# Patient Record
Sex: MALE | Race: WHITE | Employment: UNEMPLOYED | RURAL
[De-identification: names, ages, dates, MRNs, and addresses within clinical notes are randomized per-mention and may not be internally consistent; named-entity substitution may affect disease eponyms.]

---

## 2017-04-17 ENCOUNTER — OFFICE VISIT (OUTPATIENT)
Dept: PRIMARY CARE CLINIC | Age: 52
End: 2017-04-17
Payer: MEDICARE

## 2017-04-17 ENCOUNTER — HOSPITAL ENCOUNTER (OUTPATIENT)
Dept: OTHER | Age: 52
Discharge: HOME | End: 2017-04-17
Attending: FAMILY MEDICINE | Admitting: FAMILY MEDICINE

## 2017-04-17 VITALS
HEART RATE: 77 BPM | RESPIRATION RATE: 18 BRPM | HEIGHT: 71 IN | OXYGEN SATURATION: 97 % | SYSTOLIC BLOOD PRESSURE: 126 MMHG | WEIGHT: 140.2 LBS | BODY MASS INDEX: 19.63 KG/M2 | DIASTOLIC BLOOD PRESSURE: 62 MMHG

## 2017-04-17 DIAGNOSIS — R35.1 NOCTURIA: ICD-10-CM

## 2017-04-17 DIAGNOSIS — I25.10 CORONARY ARTERY DISEASE INVOLVING NATIVE CORONARY ARTERY OF NATIVE HEART, ANGINA PRESENCE UNSPECIFIED: Primary | ICD-10-CM

## 2017-04-17 DIAGNOSIS — J43.1 PANLOBULAR EMPHYSEMA (HCC): ICD-10-CM

## 2017-04-17 DIAGNOSIS — G89.4 CHRONIC PAIN SYNDROME: ICD-10-CM

## 2017-04-17 DIAGNOSIS — G60.0 CHARCOT-MARIE-TOOTH ATROPHY: ICD-10-CM

## 2017-04-17 DIAGNOSIS — I25.10 CORONARY ARTERY DISEASE INVOLVING NATIVE CORONARY ARTERY OF NATIVE HEART, ANGINA PRESENCE UNSPECIFIED: ICD-10-CM

## 2017-04-17 LAB
A/G RATIO: 1.8 (ref 0.8–2)
ALBUMIN SERPL-MCNC: 4.4 G/DL (ref 3.4–4.8)
ALP BLD-CCNC: 73 U/L (ref 25–100)
ALT SERPL-CCNC: 36 U/L (ref 4–36)
ANION GAP SERPL CALCULATED.3IONS-SCNC: 13 MMOL/L (ref 3–16)
AST SERPL-CCNC: 29 U/L (ref 8–33)
BILIRUB SERPL-MCNC: 0.5 MG/DL (ref 0.3–1.2)
BUN BLDV-MCNC: 9 MG/DL (ref 6–20)
CALCIUM SERPL-MCNC: 9.1 MG/DL (ref 8.5–10.5)
CHLORIDE BLD-SCNC: 103 MMOL/L (ref 98–107)
CHOLESTEROL, TOTAL: 147 MG/DL (ref 0–200)
CO2: 27 MMOL/L (ref 20–30)
CREAT SERPL-MCNC: 0.9 MG/DL (ref 0.4–1.2)
GFR AFRICAN AMERICAN: >59
GFR NON-AFRICAN AMERICAN: >59
GLOBULIN: 2.5 G/DL
GLUCOSE BLD-MCNC: 87 MG/DL (ref 74–106)
HCT VFR BLD CALC: 40.7 % (ref 40–54)
HDLC SERPL-MCNC: 41 MG/DL (ref 40–60)
HEMOGLOBIN: 14.1 G/DL (ref 13–18)
LDL CHOLESTEROL CALCULATED: 88 MG/DL
LDL CHOLESTEROL DIRECT: 96 MG/DL
MCH RBC QN AUTO: 32.5 PG (ref 27–32)
MCHC RBC AUTO-ENTMCNC: 34.6 G/DL (ref 31–35)
MCV RBC AUTO: 93.8 FL (ref 80–100)
PDW BLD-RTO: 13.4 % (ref 11–16)
PLATELET # BLD: 296 K/UL (ref 150–400)
PMV BLD AUTO: 10.6 FL (ref 6–10)
POTASSIUM SERPL-SCNC: 4.7 MMOL/L (ref 3.4–5.1)
PROSTATE SPECIFIC ANTIGEN: 0.31 NG/ML (ref 0–4)
RBC # BLD: 4.34 M/UL (ref 4.5–6)
SODIUM BLD-SCNC: 143 MMOL/L (ref 136–145)
TOTAL PROTEIN: 6.9 G/DL (ref 6.4–8.3)
TRIGL SERPL-MCNC: 89 MG/DL (ref 0–249)
VLDLC SERPL CALC-MCNC: 18 MG/DL
WBC # BLD: 11.4 K/UL (ref 4–11)

## 2017-04-17 PROCEDURE — 3023F SPIROM DOC REV: CPT | Performed by: FAMILY MEDICINE

## 2017-04-17 PROCEDURE — G8926 SPIRO NO PERF OR DOC: HCPCS | Performed by: FAMILY MEDICINE

## 2017-04-17 PROCEDURE — G8598 ASA/ANTIPLAT THER USED: HCPCS | Performed by: FAMILY MEDICINE

## 2017-04-17 PROCEDURE — 99203 OFFICE O/P NEW LOW 30 MIN: CPT | Performed by: FAMILY MEDICINE

## 2017-04-17 PROCEDURE — G8420 CALC BMI NORM PARAMETERS: HCPCS | Performed by: FAMILY MEDICINE

## 2017-04-17 PROCEDURE — G8427 DOCREV CUR MEDS BY ELIG CLIN: HCPCS | Performed by: FAMILY MEDICINE

## 2017-04-17 PROCEDURE — 4004F PT TOBACCO SCREEN RCVD TLK: CPT | Performed by: FAMILY MEDICINE

## 2017-04-17 PROCEDURE — 3017F COLORECTAL CA SCREEN DOC REV: CPT | Performed by: FAMILY MEDICINE

## 2017-04-17 RX ORDER — CLOPIDOGREL BISULFATE 75 MG/1
75 TABLET ORAL DAILY
Qty: 30 TABLET | Refills: 3 | Status: SHIPPED | OUTPATIENT
Start: 2017-04-17 | End: 2017-07-31 | Stop reason: SDUPTHER

## 2017-04-17 RX ORDER — LISINOPRIL 2.5 MG/1
2.5 TABLET ORAL DAILY
Qty: 30 TABLET | Refills: 3 | Status: SHIPPED | OUTPATIENT
Start: 2017-04-17 | End: 2017-07-31 | Stop reason: SDUPTHER

## 2017-04-17 RX ORDER — NITROGLYCERIN 0.4 MG/1
TABLET SUBLINGUAL
Qty: 25 TABLET | Refills: 3 | Status: SHIPPED | OUTPATIENT
Start: 2017-04-17 | End: 2018-05-22 | Stop reason: SDUPTHER

## 2017-04-17 RX ORDER — CARVEDILOL 3.12 MG/1
3.12 TABLET ORAL 2 TIMES DAILY WITH MEALS
Qty: 60 TABLET | Refills: 3 | Status: SHIPPED | OUTPATIENT
Start: 2017-04-17 | End: 2017-07-31 | Stop reason: SDUPTHER

## 2017-04-17 RX ORDER — ATORVASTATIN CALCIUM 40 MG/1
40 TABLET, FILM COATED ORAL NIGHTLY
Qty: 30 TABLET | Refills: 3 | Status: SHIPPED | OUTPATIENT
Start: 2017-04-17 | End: 2017-07-31 | Stop reason: SDUPTHER

## 2017-04-17 RX ORDER — ALBUTEROL SULFATE 90 UG/1
2 AEROSOL, METERED RESPIRATORY (INHALATION) EVERY 6 HOURS PRN
Qty: 1 INHALER | Refills: 3 | Status: SHIPPED | OUTPATIENT
Start: 2017-04-17 | End: 2018-05-24 | Stop reason: SINTOL

## 2017-04-17 RX ORDER — ASPIRIN 81 MG/1
81 TABLET ORAL DAILY
Qty: 30 TABLET | Refills: 3 | Status: SHIPPED | OUTPATIENT
Start: 2017-04-17 | End: 2017-07-31 | Stop reason: SDUPTHER

## 2017-04-17 ASSESSMENT — ENCOUNTER SYMPTOMS
ABDOMINAL PAIN: 0
EYE ITCHING: 0
VOMITING: 0
NAUSEA: 0
DIARRHEA: 0
EYE REDNESS: 0
CONSTIPATION: 0
SORE THROAT: 0
EYE DISCHARGE: 0
RHINORRHEA: 0

## 2017-04-18 ENCOUNTER — TELEPHONE (OUTPATIENT)
Dept: PRIMARY CARE CLINIC | Age: 52
End: 2017-04-18

## 2017-05-07 PROBLEM — G60.0 CHARCOT-MARIE-TOOTH ATROPHY: Status: ACTIVE | Noted: 2017-05-07

## 2017-05-07 PROBLEM — G89.4 CHRONIC PAIN SYNDROME: Status: ACTIVE | Noted: 2017-05-07

## 2017-05-07 PROBLEM — R35.1 NOCTURIA: Status: ACTIVE | Noted: 2017-05-07

## 2017-05-07 PROBLEM — I25.10 CORONARY ARTERY DISEASE INVOLVING NATIVE CORONARY ARTERY OF NATIVE HEART: Status: ACTIVE | Noted: 2017-05-07

## 2017-05-07 PROBLEM — J43.1 PANLOBULAR EMPHYSEMA (HCC): Status: ACTIVE | Noted: 2017-05-07

## 2017-05-07 ASSESSMENT — ENCOUNTER SYMPTOMS
WHEEZING: 1
COUGH: 1
SHORTNESS OF BREATH: 1

## 2017-05-24 ENCOUNTER — TELEPHONE (OUTPATIENT)
Dept: PRIMARY CARE CLINIC | Age: 52
End: 2017-05-24

## 2017-05-31 ENCOUNTER — OFFICE VISIT (OUTPATIENT)
Dept: PRIMARY CARE CLINIC | Age: 52
End: 2017-05-31
Payer: MEDICARE

## 2017-05-31 ENCOUNTER — TELEPHONE (OUTPATIENT)
Dept: PRIMARY CARE CLINIC | Age: 52
End: 2017-05-31

## 2017-05-31 VITALS
RESPIRATION RATE: 20 BRPM | SYSTOLIC BLOOD PRESSURE: 126 MMHG | DIASTOLIC BLOOD PRESSURE: 62 MMHG | OXYGEN SATURATION: 99 % | HEART RATE: 80 BPM | HEIGHT: 71 IN

## 2017-05-31 DIAGNOSIS — J43.1 PANLOBULAR EMPHYSEMA (HCC): Primary | ICD-10-CM

## 2017-05-31 DIAGNOSIS — Z71.6 ENCOUNTER FOR SMOKING CESSATION COUNSELING: ICD-10-CM

## 2017-05-31 PROCEDURE — G8427 DOCREV CUR MEDS BY ELIG CLIN: HCPCS | Performed by: FAMILY MEDICINE

## 2017-05-31 PROCEDURE — G8420 CALC BMI NORM PARAMETERS: HCPCS | Performed by: FAMILY MEDICINE

## 2017-05-31 PROCEDURE — G8926 SPIRO NO PERF OR DOC: HCPCS | Performed by: FAMILY MEDICINE

## 2017-05-31 PROCEDURE — 4004F PT TOBACCO SCREEN RCVD TLK: CPT | Performed by: FAMILY MEDICINE

## 2017-05-31 PROCEDURE — 3023F SPIROM DOC REV: CPT | Performed by: FAMILY MEDICINE

## 2017-05-31 PROCEDURE — 3017F COLORECTAL CA SCREEN DOC REV: CPT | Performed by: FAMILY MEDICINE

## 2017-05-31 PROCEDURE — G8598 ASA/ANTIPLAT THER USED: HCPCS | Performed by: FAMILY MEDICINE

## 2017-05-31 PROCEDURE — 99213 OFFICE O/P EST LOW 20 MIN: CPT | Performed by: FAMILY MEDICINE

## 2017-05-31 RX ORDER — NICOTINE 21 MG/24HR
1 PATCH, TRANSDERMAL 24 HOURS TRANSDERMAL EVERY 24 HOURS
Qty: 14 PATCH | Refills: 0 | Status: SHIPPED | OUTPATIENT
Start: 2017-05-31 | End: 2017-09-26 | Stop reason: ALTCHOICE

## 2017-05-31 RX ORDER — NICOTINE 21 MG/24HR
1 PATCH, TRANSDERMAL 24 HOURS TRANSDERMAL EVERY 24 HOURS
Qty: 42 PATCH | Refills: 0 | Status: SHIPPED | OUTPATIENT
Start: 2017-05-31 | End: 2017-07-31 | Stop reason: ALTCHOICE

## 2017-05-31 ASSESSMENT — ENCOUNTER SYMPTOMS
RHINORRHEA: 0
NAUSEA: 0
VOMITING: 0
SHORTNESS OF BREATH: 1
DIARRHEA: 0
EYE REDNESS: 0
ABDOMINAL PAIN: 0
EYE ITCHING: 0
CONSTIPATION: 0
SORE THROAT: 0
WHEEZING: 1
COUGH: 0
EYE DISCHARGE: 0

## 2017-06-05 ENCOUNTER — TELEPHONE (OUTPATIENT)
Dept: PRIMARY CARE CLINIC | Age: 52
End: 2017-06-05

## 2017-06-07 ENCOUNTER — TELEPHONE (OUTPATIENT)
Dept: PRIMARY CARE CLINIC | Age: 52
End: 2017-06-07

## 2017-07-31 ENCOUNTER — OFFICE VISIT (OUTPATIENT)
Dept: PRIMARY CARE CLINIC | Age: 52
End: 2017-07-31
Payer: MEDICARE

## 2017-07-31 VITALS
OXYGEN SATURATION: 99 % | DIASTOLIC BLOOD PRESSURE: 68 MMHG | HEIGHT: 71 IN | HEART RATE: 58 BPM | SYSTOLIC BLOOD PRESSURE: 112 MMHG | RESPIRATION RATE: 20 BRPM

## 2017-07-31 DIAGNOSIS — J43.1 PANLOBULAR EMPHYSEMA (HCC): Primary | ICD-10-CM

## 2017-07-31 DIAGNOSIS — F51.04 PSYCHOPHYSIOLOGICAL INSOMNIA: ICD-10-CM

## 2017-07-31 DIAGNOSIS — F41.9 ANXIETY: ICD-10-CM

## 2017-07-31 PROBLEM — Z71.6 ENCOUNTER FOR SMOKING CESSATION COUNSELING: Status: RESOLVED | Noted: 2017-05-31 | Resolved: 2017-07-31

## 2017-07-31 PROCEDURE — G8598 ASA/ANTIPLAT THER USED: HCPCS | Performed by: FAMILY MEDICINE

## 2017-07-31 PROCEDURE — 3023F SPIROM DOC REV: CPT | Performed by: FAMILY MEDICINE

## 2017-07-31 PROCEDURE — G8926 SPIRO NO PERF OR DOC: HCPCS | Performed by: FAMILY MEDICINE

## 2017-07-31 PROCEDURE — G8427 DOCREV CUR MEDS BY ELIG CLIN: HCPCS | Performed by: FAMILY MEDICINE

## 2017-07-31 PROCEDURE — G8420 CALC BMI NORM PARAMETERS: HCPCS | Performed by: FAMILY MEDICINE

## 2017-07-31 PROCEDURE — 4004F PT TOBACCO SCREEN RCVD TLK: CPT | Performed by: FAMILY MEDICINE

## 2017-07-31 PROCEDURE — 99214 OFFICE O/P EST MOD 30 MIN: CPT | Performed by: FAMILY MEDICINE

## 2017-07-31 PROCEDURE — 3017F COLORECTAL CA SCREEN DOC REV: CPT | Performed by: FAMILY MEDICINE

## 2017-07-31 RX ORDER — ATORVASTATIN CALCIUM 40 MG/1
40 TABLET, FILM COATED ORAL NIGHTLY
Qty: 30 TABLET | Refills: 3 | Status: SHIPPED | OUTPATIENT
Start: 2017-07-31 | End: 2018-05-12

## 2017-07-31 RX ORDER — AMITRIPTYLINE HYDROCHLORIDE 25 MG/1
25 TABLET, FILM COATED ORAL NIGHTLY
Qty: 30 TABLET | Refills: 3 | Status: SHIPPED | OUTPATIENT
Start: 2017-07-31 | End: 2017-12-19 | Stop reason: SDUPTHER

## 2017-07-31 RX ORDER — ASPIRIN 81 MG/1
81 TABLET ORAL DAILY
Qty: 30 TABLET | Refills: 3 | Status: SHIPPED | OUTPATIENT
Start: 2017-07-31 | End: 2018-05-24 | Stop reason: SDUPTHER

## 2017-07-31 RX ORDER — CARVEDILOL 3.12 MG/1
3.12 TABLET ORAL 2 TIMES DAILY WITH MEALS
Qty: 60 TABLET | Refills: 3 | Status: SHIPPED | OUTPATIENT
Start: 2017-07-31 | End: 2018-05-12

## 2017-07-31 RX ORDER — CLOPIDOGREL BISULFATE 75 MG/1
75 TABLET ORAL DAILY
Qty: 30 TABLET | Refills: 3 | Status: SHIPPED | OUTPATIENT
Start: 2017-07-31 | End: 2018-05-12

## 2017-07-31 RX ORDER — LISINOPRIL 2.5 MG/1
2.5 TABLET ORAL DAILY
Qty: 30 TABLET | Refills: 3 | Status: SHIPPED | OUTPATIENT
Start: 2017-07-31 | End: 2018-05-12

## 2017-07-31 ASSESSMENT — ENCOUNTER SYMPTOMS
ABDOMINAL PAIN: 0
EYE ITCHING: 0
CONSTIPATION: 0
WHEEZING: 0
VOMITING: 0
COUGH: 0
DIARRHEA: 0
EYE DISCHARGE: 0
EYE REDNESS: 0
SORE THROAT: 0
NAUSEA: 0
RHINORRHEA: 0
SHORTNESS OF BREATH: 0

## 2017-07-31 ASSESSMENT — PATIENT HEALTH QUESTIONNAIRE - PHQ9
2. FEELING DOWN, DEPRESSED OR HOPELESS: 0
SUM OF ALL RESPONSES TO PHQ9 QUESTIONS 1 & 2: 0
1. LITTLE INTEREST OR PLEASURE IN DOING THINGS: 0
SUM OF ALL RESPONSES TO PHQ QUESTIONS 1-9: 0

## 2017-08-10 ENCOUNTER — TELEPHONE (OUTPATIENT)
Dept: SURGERY | Facility: CLINIC | Age: 52
End: 2017-08-10

## 2017-08-10 NOTE — TELEPHONE ENCOUNTER
Patient called saying it is time for his colonoscopy hx of polyps . Dr Shannon did last colonscopy. Would like done @ [facility] sometime in September.

## 2017-08-15 ENCOUNTER — PREP FOR SURGERY (OUTPATIENT)
Dept: OTHER | Facility: HOSPITAL | Age: 52
End: 2017-08-15

## 2017-08-15 DIAGNOSIS — Z12.11 SCREEN FOR COLON CANCER: Primary | ICD-10-CM

## 2017-08-23 PROBLEM — Z12.11 SCREEN FOR COLON CANCER: Status: ACTIVE | Noted: 2017-08-23

## 2017-08-28 RX ORDER — CARVEDILOL 12.5 MG/1
12.5 TABLET ORAL 2 TIMES DAILY WITH MEALS
COMMUNITY
End: 2020-07-08

## 2017-08-28 RX ORDER — TRAMADOL HYDROCHLORIDE 50 MG/1
50 TABLET ORAL EVERY 8 HOURS PRN
COMMUNITY
End: 2020-07-08

## 2017-08-28 RX ORDER — AMITRIPTYLINE HYDROCHLORIDE 50 MG/1
50 TABLET, FILM COATED ORAL NIGHTLY
COMMUNITY
End: 2020-07-08

## 2017-08-28 RX ORDER — LISINOPRIL 2.5 MG/1
2.5 TABLET ORAL DAILY
COMMUNITY
End: 2020-07-08

## 2017-08-28 RX ORDER — ATORVASTATIN CALCIUM 40 MG/1
40 TABLET, FILM COATED ORAL DAILY
COMMUNITY
End: 2020-07-08

## 2017-08-28 RX ORDER — OXYCODONE AND ACETAMINOPHEN 7.5; 325 MG/1; MG/1
1 TABLET ORAL EVERY 8 HOURS PRN
COMMUNITY
End: 2020-07-08

## 2017-08-28 RX ORDER — CLOPIDOGREL BISULFATE 75 MG/1
75 TABLET ORAL DAILY
COMMUNITY
End: 2020-07-08

## 2017-08-28 RX ORDER — ASPIRIN 81 MG/1
81 TABLET ORAL DAILY
COMMUNITY
End: 2020-07-08

## 2017-09-12 ENCOUNTER — HOSPITAL ENCOUNTER (OUTPATIENT)
Facility: HOSPITAL | Age: 52
Setting detail: HOSPITAL OUTPATIENT SURGERY
Discharge: HOME | End: 2017-09-12
Attending: SURGERY | Admitting: SURGERY

## 2017-09-12 ENCOUNTER — ANESTHESIA EVENT (OUTPATIENT)
Dept: GASTROENTEROLOGY | Facility: HOSPITAL | Age: 52
End: 2017-09-12

## 2017-09-12 ENCOUNTER — ANESTHESIA (OUTPATIENT)
Dept: GASTROENTEROLOGY | Facility: HOSPITAL | Age: 52
End: 2017-09-12

## 2017-09-12 VITALS
BODY MASS INDEX: 24.36 KG/M2 | SYSTOLIC BLOOD PRESSURE: 131 MMHG | RESPIRATION RATE: 18 BRPM | OXYGEN SATURATION: 96 % | TEMPERATURE: 98.1 F | WEIGHT: 174 LBS | HEART RATE: 66 BPM | DIASTOLIC BLOOD PRESSURE: 90 MMHG | HEIGHT: 71 IN

## 2017-09-12 DIAGNOSIS — Z12.11 SCREEN FOR COLON CANCER: ICD-10-CM

## 2017-09-12 PROCEDURE — S0260 H&P FOR SURGERY: HCPCS | Performed by: SURGERY

## 2017-09-12 PROCEDURE — 88305 TISSUE EXAM BY PATHOLOGIST: CPT | Performed by: SURGERY

## 2017-09-12 PROCEDURE — 25010000002 PROPOFOL 200 MG/20ML EMULSION: Performed by: NURSE ANESTHETIST, CERTIFIED REGISTERED

## 2017-09-12 PROCEDURE — 25010000002 MIDAZOLAM PER 1 MG: Performed by: NURSE ANESTHETIST, CERTIFIED REGISTERED

## 2017-09-12 RX ORDER — SODIUM CHLORIDE 0.9 % (FLUSH) 0.9 %
3 SYRINGE (ML) INJECTION AS NEEDED
Status: DISCONTINUED | OUTPATIENT
Start: 2017-09-12 | End: 2017-09-12 | Stop reason: HOSPADM

## 2017-09-12 RX ORDER — SODIUM CHLORIDE, SODIUM LACTATE, POTASSIUM CHLORIDE, CALCIUM CHLORIDE 600; 310; 30; 20 MG/100ML; MG/100ML; MG/100ML; MG/100ML
1000 INJECTION, SOLUTION INTRAVENOUS CONTINUOUS PRN
Status: DISCONTINUED | OUTPATIENT
Start: 2017-09-12 | End: 2017-09-12 | Stop reason: HOSPADM

## 2017-09-12 RX ORDER — MEPERIDINE HYDROCHLORIDE 50 MG/ML
25 INJECTION INTRAMUSCULAR; INTRAVENOUS; SUBCUTANEOUS ONCE
Status: DISCONTINUED | OUTPATIENT
Start: 2017-09-12 | End: 2017-09-12 | Stop reason: HOSPADM

## 2017-09-12 RX ORDER — MIDAZOLAM HYDROCHLORIDE 1 MG/ML
INJECTION INTRAMUSCULAR; INTRAVENOUS AS NEEDED
Status: DISCONTINUED | OUTPATIENT
Start: 2017-09-12 | End: 2017-09-12 | Stop reason: SURG

## 2017-09-12 RX ORDER — PROPOFOL 10 MG/ML
INJECTION, EMULSION INTRAVENOUS AS NEEDED
Status: DISCONTINUED | OUTPATIENT
Start: 2017-09-12 | End: 2017-09-12 | Stop reason: SURG

## 2017-09-12 RX ORDER — MEPERIDINE HYDROCHLORIDE 25 MG/ML
INJECTION INTRAMUSCULAR; INTRAVENOUS; SUBCUTANEOUS
Status: COMPLETED
Start: 2017-09-12 | End: 2017-09-12

## 2017-09-12 RX ORDER — ONDANSETRON 2 MG/ML
4 INJECTION INTRAMUSCULAR; INTRAVENOUS ONCE
Status: DISCONTINUED | OUTPATIENT
Start: 2017-09-12 | End: 2017-09-12 | Stop reason: HOSPADM

## 2017-09-12 RX ADMIN — PROPOFOL 40 MG: 10 INJECTION, EMULSION INTRAVENOUS at 08:21

## 2017-09-12 RX ADMIN — PROPOFOL 60 MG: 10 INJECTION, EMULSION INTRAVENOUS at 08:23

## 2017-09-12 RX ADMIN — LIDOCAINE HYDROCHLORIDE 20 MG: 20 INJECTION, SOLUTION INTRAVENOUS at 08:18

## 2017-09-12 RX ADMIN — PROPOFOL 40 MG: 10 INJECTION, EMULSION INTRAVENOUS at 08:18

## 2017-09-12 RX ADMIN — PROPOFOL 60 MG: 10 INJECTION, EMULSION INTRAVENOUS at 08:26

## 2017-09-12 RX ADMIN — SODIUM CHLORIDE, POTASSIUM CHLORIDE, SODIUM LACTATE AND CALCIUM CHLORIDE 500 ML: 600; 310; 30; 20 INJECTION, SOLUTION INTRAVENOUS at 07:51

## 2017-09-12 RX ADMIN — MIDAZOLAM HYDROCHLORIDE 2 MG: 1 INJECTION, SOLUTION INTRAMUSCULAR; INTRAVENOUS at 08:08

## 2017-09-12 RX ADMIN — PROPOFOL 60 MG: 10 INJECTION, EMULSION INTRAVENOUS at 08:32

## 2017-09-12 RX ADMIN — MEPERIDINE HYDROCHLORIDE 25 MG: 25 INJECTION, SOLUTION INTRAMUSCULAR; INTRAVENOUS; SUBCUTANEOUS at 07:53

## 2017-09-12 NOTE — NURSING NOTE
0930 PT ACCOMPANIED BY [facility] TO REGISTRATION AREA TO CHECK ON A FRIEND.  RETURNED IN 5 MINUTES.  AWAITING BUS RIDE.

## 2017-09-12 NOTE — ANESTHESIA PREPROCEDURE EVALUATION
Anesthesia Evaluation     Patient summary reviewed and Nursing notes reviewed   no history of anesthetic complications:  NPO Solid Status: > 8 hours  NPO Liquid Status: > 8 hours     Airway   Mallampati: II  no difficulty expected  Dental - normal exam     Pulmonary - normal exam    breath sounds clear to auscultation  (+) COPD mild,   Cardiovascular - normal exam    Rhythm: regular  Rate: normal    (+) hypertension well controlled, hyperlipidemia      Neuro/Psych  (+) CVA, numbness, psychiatric history Anxiety and Depression,    GI/Hepatic/Renal/Endo - negative ROS     Musculoskeletal     Abdominal    Substance History - negative use     OB/GYN negative ob/gyn ROS         Other   (+) arthritis     ROS/Med Hx Other: Charcot Leigh Tooth muscular atrophy  Hypertension       Mobility impaired               Phys Exam Other: Wheel chair for mobility                              Anesthesia Plan    ASA 3     MAC     Anesthetic plan and risks discussed with patient.

## 2017-09-12 NOTE — H&P
Reason for Consultation:  Screening colonoscopy / hx of colon polyps    Chief complaint :  Screening colonoscopy \  hx of colon polyps    Subjective .     History of present illness:  I did see the patient today as a consultation for evaluation and treatment of a need for screening colonoscopy.  There are no other complaints of other than a previous history of colon polyps.    Review of Systems:    Review of Systems - General ROS: negative for - chills, fatigue, fever, hot flashes, malaise or night sweats  Psychological ROS: negative for - behavioral disorder, disorientation, hallucinations, hostility or mood swings  ENT ROS: negative for - nasal polyps, oral lesions, sinus pain, sneezing or sore throat  Breast ROS: negative for - galactorrhea or new or changing breast lumps  Respiratory ROS: negative for - hemoptysis, orthopnea, pleuritic pain, sputum changes or stridor  Cardiovascular ROS: negative for - dyspnea on exertion, edema, irregular heartbeat, murmur, orthopnea, palpitations or rapid heart rate  Gastrointestinal ROS: negative for - change in stools, gas/bloating, hematemesis, melena or stool incontinence.  Genito-Urinary ROS: negative for - dysuria, genital ulcers, nocturia or pelvic pain  Musculoskeletal ROS: negative for - gait disturbance or muscle pain  Neurological ROS: negative for - dizziness, gait disturbance, memory loss, numbness/tingling or seizures      Objective     Vital Signs   Temp:  [98.2 °F (36.8 °C)] 98.2 °F (36.8 °C)  Heart Rate:  [71] 71  Resp:  [16] 16  BP: (149)/(98) 149/98    Physical Exam:     General Appearance:    Alert, cooperative, in no acute distress   Head:    Normocephalic, without obvious abnormality, atraumatic   Eyes:            Lids and lashes normal, conjunctivae and sclerae normal, no   icterus, no pallor, corneas clear, PERRLA   Ears:    Ears appear intact with no abnormalities noted   Throat:   No oral lesions, no thrush, oral mucosa moist   Neck:   No  adenopathy, supple, trachea midline, no thyromegaly, no   carotid bruit, no JVD   Back:     No kyphosis present, no scoliosis present, no skin lesions,      erythema or scars, no tenderness to percussion or                   palpation,   range of motion normal   Lungs:     Clear to auscultation,respirations regular, even and                  unlabored    Heart:    Regular rhythm and normal rate, normal S1 and S2, no            murmur, no gallop, no rub, no click   Chest Wall:    No abnormalities observed   Abdomen:     Normal bowel sounds, no masses, no organomegaly, soft        non-tender, non-distended, no guarding, there is no evidence of tenderness   Extremities:   Moves all extremities well, no edema, no cyanosis, no             redness   Pulses:   Pulses palpable and equal bilaterally   Skin:   No bleeding, bruising or rash   Lymph nodes:   No palpable adenopathy   Neurologic:   Cranial nerves 2 - 12 grossly intact, sensation intact, DTR       present and equal bilaterally           Assessment/Plan     Screening colonoscopy  History of colon polyps      I did have a detailed and extensive discussion with the patient in the office today.  The full risks and benefits of operative versus nonoperative intervention were discussed with the paient, they understand, agree, and wish to proceed with the surgical treatment plan of colonoscopy.      Geo Shannon MD

## 2017-09-12 NOTE — ANESTHESIA POSTPROCEDURE EVALUATION
Patient: Gene Lawton    Procedure Summary     Date Anesthesia Start Anesthesia Stop Room / Location    09/12/17 0807 [id number] [facility] ENDOSCOPY 3 / [facility] ENDOSCOPY       Procedure Diagnosis Surgeon Provider    COLONOSCOPY WITH POLYPECTOMY (N/A ) Screen for colon cancer  (Screen for colon cancer [Z12.11]) MD Deejay Starkey CRNA          Anesthesia Type: MAC  Last vitals  BP   130/79 (09/12/17 0845)    Temp   98.1 °F (36.7 °C) (09/12/17 0845)    Pulse   73 (09/12/17 0845)   Resp   18 (09/12/17 0845)    SpO2   95 % (09/12/17 0845)      Post Anesthesia Care and Evaluation    Patient location during evaluation: bedside  Patient participation: complete - patient participated  Level of consciousness: awake and alert  Pain management: satisfactory to patient  Airway patency: patent  Anesthetic complications: No anesthetic complications  PONV Status: controlled  Cardiovascular status: acceptable and stable  Respiratory status: acceptable  Hydration status: acceptable

## 2017-09-12 NOTE — PLAN OF CARE
Problem: GI Endoscopy (Adult)  Goal: Signs and Symptoms of Listed Potential Problems Will be Absent or Manageable (GI Endoscopy)  Outcome: Ongoing (interventions implemented as appropriate)    09/12/17 0722   GI Endoscopy   Problems Assessed (GI Endoscopy) all   Problems Present (GI Endoscopy) none

## 2017-09-12 NOTE — DISCHARGE INSTRUCTIONS
To assist you in voiding:  Drink plenty of fluids  Listen to running water while attempting to void.    If you are unable to urinate and you have an uncomfortable urge to void or it has been   6 hours since you were discharged, return to the Emergency Room      Resume blood thinner (PLAVIX) after 7 days.

## 2017-09-15 LAB
LAB AP CASE REPORT: NORMAL
LAB AP CLINICAL INFORMATION: NORMAL
Lab: NORMAL
PATH REPORT.FINAL DX SPEC: NORMAL

## 2017-09-26 ENCOUNTER — OFFICE VISIT (OUTPATIENT)
Dept: PRIMARY CARE CLINIC | Age: 52
End: 2017-09-26
Payer: MEDICARE

## 2017-09-26 VITALS
HEART RATE: 74 BPM | HEIGHT: 71 IN | OXYGEN SATURATION: 98 % | DIASTOLIC BLOOD PRESSURE: 80 MMHG | RESPIRATION RATE: 20 BRPM | SYSTOLIC BLOOD PRESSURE: 124 MMHG

## 2017-09-26 DIAGNOSIS — F51.04 PSYCHOPHYSIOLOGICAL INSOMNIA: ICD-10-CM

## 2017-09-26 DIAGNOSIS — I25.10 CORONARY ARTERY DISEASE INVOLVING NATIVE CORONARY ARTERY OF NATIVE HEART, ANGINA PRESENCE UNSPECIFIED: ICD-10-CM

## 2017-09-26 DIAGNOSIS — J43.1 PANLOBULAR EMPHYSEMA (HCC): ICD-10-CM

## 2017-09-26 DIAGNOSIS — Z23 NEEDS FLU SHOT: Primary | ICD-10-CM

## 2017-09-26 DIAGNOSIS — R25.1 TREMORS OF NERVOUS SYSTEM: ICD-10-CM

## 2017-09-26 DIAGNOSIS — F41.9 ANXIETY: ICD-10-CM

## 2017-09-26 PROCEDURE — 99214 OFFICE O/P EST MOD 30 MIN: CPT | Performed by: FAMILY MEDICINE

## 2017-09-26 PROCEDURE — 3017F COLORECTAL CA SCREEN DOC REV: CPT | Performed by: FAMILY MEDICINE

## 2017-09-26 PROCEDURE — 3023F SPIROM DOC REV: CPT | Performed by: FAMILY MEDICINE

## 2017-09-26 PROCEDURE — G8420 CALC BMI NORM PARAMETERS: HCPCS | Performed by: FAMILY MEDICINE

## 2017-09-26 PROCEDURE — G8926 SPIRO NO PERF OR DOC: HCPCS | Performed by: FAMILY MEDICINE

## 2017-09-26 PROCEDURE — G0008 ADMIN INFLUENZA VIRUS VAC: HCPCS | Performed by: FAMILY MEDICINE

## 2017-09-26 PROCEDURE — 90688 IIV4 VACCINE SPLT 0.5 ML IM: CPT | Performed by: FAMILY MEDICINE

## 2017-09-26 PROCEDURE — G8427 DOCREV CUR MEDS BY ELIG CLIN: HCPCS | Performed by: FAMILY MEDICINE

## 2017-09-26 PROCEDURE — 4004F PT TOBACCO SCREEN RCVD TLK: CPT | Performed by: FAMILY MEDICINE

## 2017-09-26 PROCEDURE — G8598 ASA/ANTIPLAT THER USED: HCPCS | Performed by: FAMILY MEDICINE

## 2017-09-26 RX ORDER — OXYCODONE AND ACETAMINOPHEN 7.5; 325 MG/1; MG/1
1 TABLET ORAL
COMMUNITY
End: 2018-02-13 | Stop reason: ALTCHOICE

## 2017-09-26 RX ORDER — TRAMADOL HYDROCHLORIDE 50 MG/1
50 TABLET ORAL
COMMUNITY
End: 2018-02-13 | Stop reason: ALTCHOICE

## 2017-09-26 NOTE — PROGRESS NOTES
After obtaining consent, and per orders of Dr. Hanna Vogt, injection of flu given in Right arm by Adam Spencer. Patient instructed to remain in clinic for 20 minutes afterwards, and to report any adverse reaction to me immediately.

## 2017-09-26 NOTE — PROGRESS NOTES
SUBJECTIVE:    Patient ID: Mcihel Phillips is a 45 y.o. male. Chief Complaint   Patient presents with    COPD     f/u    Anxiety         HPI: Patient has had COPD for a long time. He has been using nebulizer inhalation treatments as ordered. He is not on oxygen. He has some dyspnea with exertion. With on and off cough, SOB and wheezing that does respond to treatment. He has been using the Albuterol inhaler rarely. He does take incruse with good response. Patient does have known CAD as well. Denies chest pain. He does admit to palpitations. He admits to [facility], which is contributed to COPD. He is on appropriate medications of lisinopril, coreg, plavix, aspirin, and lipitor. Patient does have anxiety. He was recently started on elavil. He states he is not worrying anymore. He is sleeping better. However, he states his nerves are bad. He admits to feeling tremulous. Patient's medications, allergies, past medical, surgical, social and family histories were reviewed and updated as appropriate. Review of Systems   Constitutional: Negative for chills, fatigue and fever. HENT: Negative for congestion, ear pain, rhinorrhea and sore throat. Eyes: Negative for discharge, redness and itching. Respiratory: Positive for shortness of breath. Negative for cough and wheezing. Cardiovascular: Negative for chest pain, palpitations and leg swelling. Gastrointestinal: Negative for abdominal pain, constipation, diarrhea, nausea and vomiting. Genitourinary:        Nocturia   Musculoskeletal: Positive for gait problem and myalgias. Negative for arthralgias and joint swelling. Neurological: Positive for tremors. Psychiatric/Behavioral: Negative for sleep disturbance. The patient is not nervous/anxious.         OBJECTIVE:  /80 (Site: Left Arm, Position: Sitting)   Pulse 74   Resp 20   Ht 5' 11\" (1.803 m)   SpO2 98% Comment: room air     Wt Readings from Last 2 Encounters:

## 2017-09-26 NOTE — PATIENT INSTRUCTIONS
· Keep a list of your medicines with you. List all of the prescription medicines, nonprescription medicines, supplements, natural remedies, and vitamins that you take. Tell your healthcare providers who treat you about all of the products you are taking. Your provider can provide you with a form to keep track of them. Just ask. · Follow the directions that come with your medicine, including information about food or alcohol. Make sure you know how and when to take your medicine. Do not take more or less than you are supposed to take. · Keep all medicines out of the reach of children. · Store medicines according to the directions on the label. · Monitor yourself. Learn to know how your body reacts to your new medicine and keep track of how it makes you feel before attempting (If your provider has allowed you to do so) to drive or go to work. · Seek emergency medical attention if you think you have used too much of this medicine. An overdose of any prescription medicine can be fatal. Overdose symptoms may include extreme drowsiness, muscle weakness, confusion, cold and clammy skin, pinpoint pupils, shallow breathing, slow heart rate, fainting, or coma. · Don't share prescription medicines with others, even when they seem to have the same symptoms. What may be good for you may be harmful to others. · If you are no longer taking a prescribed medication and you have pills left please take your pills out of their original containers. Mix crushed pills with an undesirable substance, such as cat litter or used coffee grounds. Put the mixture into a disposable container with a lid, such as an empty margarine tub, or into a sealable bag. Cover up or remove any of your personal information on the empty containers by covering it with black permanent marker or duct tape. Place the sealed container with the mixture, and the empty drug containers, in the trash.    · If you use a medication that is in the form of a patch, dispose of used patches by folding them in half so that the sticky sides meet, and then flushing them down a toilet. They should not be placed in the household trash where children or pets can find them. · If you have any questions, ask your provider or pharmacist for more information. · Be sure to keep all appointments for provider visits or tests. We are committed to providing you with the best care possible. In order to help us achieve these goals please remember to bring all medications, herbal products, and over the counter supplements with you to each visit. If your provider has ordered testing for you, please be sure to follow up with our office if you have not received results within 7 days after the testing took place. *If you receive a survey after visiting one of our offices, please take time to share your experience concerning your physician office visit. These surveys are confidential and no health information about you is shared. We are eager to improve for you and we are counting on your feedback to help make that happen.

## 2017-09-26 NOTE — MR AVS SNAPSHOT
· Monitor yourself. Learn to know how your body reacts to your new medicine and keep track of how it makes you feel before attempting (If your provider has allowed you to do so) to drive or go to work. · Seek emergency medical attention if you think you have used too much of this medicine. An overdose of any prescription medicine can be fatal. Overdose symptoms may include extreme drowsiness, muscle weakness, confusion, cold and clammy skin, pinpoint pupils, shallow breathing, slow heart rate, fainting, or coma. · Don't share prescription medicines with others, even when they seem to have the same symptoms. What may be good for you may be harmful to others. · If you are no longer taking a prescribed medication and you have pills left please take your pills out of their original containers. Mix crushed pills with an undesirable substance, such as cat litter or used coffee grounds. Put the mixture into a disposable container with a lid, such as an empty margarine tub, or into a sealable bag. Cover up or remove any of your personal information on the empty containers by covering it with black permanent marker or duct tape. Place the sealed container with the mixture, and the empty drug containers, in the trash. · If you use a medication that is in the form of a patch, dispose of used patches by folding them in half so that the sticky sides meet, and then flushing them down a toilet. They should not be placed in the household trash where children or pets can find them. · If you have any questions, ask your provider or pharmacist for more information. · Be sure to keep all appointments for provider visits or tests. We are committed to providing you with the best care possible. In order to help us achieve these goals please remember to bring all medications, herbal products, and over the counter supplements with you to each visit.      If your provider has ordered testing for you, please be sure to follow up

## 2017-10-14 PROBLEM — R25.1 TREMORS OF NERVOUS SYSTEM: Status: ACTIVE | Noted: 2017-10-14

## 2017-10-14 ASSESSMENT — ENCOUNTER SYMPTOMS
COUGH: 0
SORE THROAT: 0
ABDOMINAL PAIN: 0
CONSTIPATION: 0
SHORTNESS OF BREATH: 1
EYE ITCHING: 0
DIARRHEA: 0
NAUSEA: 0
EYE DISCHARGE: 0
WHEEZING: 0
EYE REDNESS: 0
RHINORRHEA: 0
VOMITING: 0

## 2017-10-15 NOTE — ASSESSMENT & PLAN NOTE
Patient has seen a physician at [facility] in the last few months. He was not scheduled a follow up. Will obtain their records.   If they are not planning on addressing the patient's neurological problems, then I will refer to another physiatrist or neurologist.

## 2017-12-06 ENCOUNTER — APPOINTMENT (OUTPATIENT)
Dept: ULTRASOUND IMAGING | Facility: HOSPITAL | Age: 52
End: 2017-12-06

## 2017-12-06 ENCOUNTER — HOSPITAL ENCOUNTER (EMERGENCY)
Facility: HOSPITAL | Age: 52
Discharge: HOME | End: 2017-12-06
Attending: EMERGENCY MEDICINE | Admitting: EMERGENCY MEDICINE

## 2017-12-06 VITALS
HEART RATE: 65 BPM | OXYGEN SATURATION: 97 % | WEIGHT: 174 LBS | HEIGHT: 71 IN | DIASTOLIC BLOOD PRESSURE: 90 MMHG | BODY MASS INDEX: 24.36 KG/M2 | TEMPERATURE: 98.1 F | SYSTOLIC BLOOD PRESSURE: 127 MMHG | RESPIRATION RATE: 16 BRPM

## 2017-12-06 DIAGNOSIS — R60.9 EDEMA, UNSPECIFIED TYPE: Primary | ICD-10-CM

## 2017-12-06 LAB
ALBUMIN SERPL-MCNC: 4.4 G/DL (ref 3.5–5)
ALBUMIN/GLOB SERPL: 1.4 G/DL (ref 1–2)
ALP SERPL-CCNC: 72 U/L (ref 38–126)
ALT SERPL W P-5'-P-CCNC: 82 U/L (ref 13–69)
ANION GAP SERPL CALCULATED.3IONS-SCNC: 16 MMOL/L
AST SERPL-CCNC: 44 U/L (ref 15–46)
BASOPHILS # BLD AUTO: 0.06 10*3/MM3 (ref 0–0.2)
BASOPHILS NFR BLD AUTO: 0.6 % (ref 0–2.5)
BILIRUB SERPL-MCNC: 0.4 MG/DL (ref 0.2–1.3)
BUN BLD-MCNC: 12 MG/DL (ref 7–20)
BUN/CREAT SERPL: 12 (ref 6.3–21.9)
CALCIUM SPEC-SCNC: 9.7 MG/DL (ref 8.4–10.2)
CHLORIDE SERPL-SCNC: 103 MMOL/L (ref 98–107)
CO2 SERPL-SCNC: 26 MMOL/L (ref 26–30)
CREAT BLD-MCNC: 1 MG/DL (ref 0.6–1.3)
DEPRECATED RDW RBC AUTO: 42.7 FL (ref 37–54)
EOSINOPHIL # BLD AUTO: 0.73 10*3/MM3 (ref 0–0.7)
EOSINOPHIL NFR BLD AUTO: 6.8 % (ref 0–7)
ERYTHROCYTE [DISTWIDTH] IN BLOOD BY AUTOMATED COUNT: 12.4 % (ref 11.5–14.5)
GFR SERPL CREATININE-BSD FRML MDRD: 78 ML/MIN/1.73
GLOBULIN UR ELPH-MCNC: 3.1 GM/DL
GLUCOSE BLD-MCNC: 103 MG/DL (ref 74–98)
HCT VFR BLD AUTO: 39.5 % (ref 42–52)
HGB BLD-MCNC: 13.8 G/DL (ref 14–18)
HOLD SPECIMEN: NORMAL
HOLD SPECIMEN: NORMAL
IMM GRANULOCYTES # BLD: 0.04 10*3/MM3 (ref 0–0.06)
IMM GRANULOCYTES NFR BLD: 0.4 % (ref 0–0.6)
LYMPHOCYTES # BLD AUTO: 3.08 10*3/MM3 (ref 0.6–3.4)
LYMPHOCYTES NFR BLD AUTO: 28.8 % (ref 10–50)
MCH RBC QN AUTO: 32.7 PG (ref 27–31)
MCHC RBC AUTO-ENTMCNC: 34.9 G/DL (ref 30–37)
MCV RBC AUTO: 93.6 FL (ref 80–94)
MONOCYTES # BLD AUTO: 0.91 10*3/MM3 (ref 0–0.9)
MONOCYTES NFR BLD AUTO: 8.5 % (ref 0–12)
NEUTROPHILS # BLD AUTO: 5.87 10*3/MM3 (ref 2–6.9)
NEUTROPHILS NFR BLD AUTO: 54.9 % (ref 37–80)
NRBC BLD MANUAL-RTO: 0 /100 WBC (ref 0–0)
NT-PROBNP SERPL-MCNC: 169 PG/ML (ref 0–125)
PLATELET # BLD AUTO: 274 10*3/MM3 (ref 130–400)
PMV BLD AUTO: 10.3 FL (ref 6–12)
POTASSIUM BLD-SCNC: 4 MMOL/L (ref 3.5–5.1)
PROT SERPL-MCNC: 7.5 G/DL (ref 6.3–8.2)
RBC # BLD AUTO: 4.22 10*6/MM3 (ref 4.7–6.1)
SODIUM BLD-SCNC: 141 MMOL/L (ref 137–145)
TROPONIN I SERPL-MCNC: 0.01 NG/ML (ref 0–0.05)
TROPONIN I SERPL-MCNC: <0.012 NG/ML (ref 0–0.03)
WBC NRBC COR # BLD: 10.69 10*3/MM3 (ref 4.8–10.8)
WHOLE BLOOD HOLD SPECIMEN: NORMAL
WHOLE BLOOD HOLD SPECIMEN: NORMAL

## 2017-12-06 PROCEDURE — 83880 ASSAY OF NATRIURETIC PEPTIDE: CPT | Performed by: EMERGENCY MEDICINE

## 2017-12-06 PROCEDURE — 93005 ELECTROCARDIOGRAM TRACING: CPT | Performed by: EMERGENCY MEDICINE

## 2017-12-06 PROCEDURE — 99284 EMERGENCY DEPT VISIT MOD MDM: CPT

## 2017-12-06 PROCEDURE — 80053 COMPREHEN METABOLIC PANEL: CPT | Performed by: EMERGENCY MEDICINE

## 2017-12-06 PROCEDURE — 85025 COMPLETE CBC W/AUTO DIFF WBC: CPT | Performed by: EMERGENCY MEDICINE

## 2017-12-06 PROCEDURE — 84484 ASSAY OF TROPONIN QUANT: CPT | Performed by: EMERGENCY MEDICINE

## 2017-12-06 PROCEDURE — 93970 EXTREMITY STUDY: CPT

## 2017-12-06 RX ORDER — ACETAMINOPHEN 325 MG/1
650 TABLET ORAL ONCE
Status: COMPLETED | OUTPATIENT
Start: 2017-12-06 | End: 2017-12-06

## 2017-12-06 RX ORDER — SODIUM CHLORIDE 0.9 % (FLUSH) 0.9 %
10 SYRINGE (ML) INJECTION AS NEEDED
Status: DISCONTINUED | OUTPATIENT
Start: 2017-12-06 | End: 2017-12-06 | Stop reason: HOSPADM

## 2017-12-06 RX ORDER — OXYCODONE HYDROCHLORIDE AND ACETAMINOPHEN 5; 325 MG/1; MG/1
1 TABLET ORAL ONCE
Status: DISCONTINUED | OUTPATIENT
Start: 2017-12-06 | End: 2017-12-06 | Stop reason: HOSPADM

## 2017-12-06 RX ADMIN — ACETAMINOPHEN 650 MG: 325 TABLET, FILM COATED ORAL at 14:06

## 2017-12-06 NOTE — ED PROVIDER NOTES
Subjective   History of Present Illness   52-year-old male with a history of muscular dystrophy, impaired mobility, recent started on a new long-term opiate Xtampza in presenting with bilateral lower extremity swelling left more than right since then.  Also states that he had an episode of chest pain today, several hours prior to arrival, that resolved on its own.  States he thinks he had a prior blood clot but does not remember well.  Denies any other specific complaints.    Review of Systems   Cardiovascular: Positive for leg swelling.   All other systems reviewed and are negative.      Past Medical History:   Diagnosis Date   • Arthritis    • Charcot Leigh Tooth muscular atrophy    • Colon polyps     REPORTS HISTORY   • COPD (chronic obstructive pulmonary disease)    • Depression    • Difficulty reading    • Difficulty writing    • High cholesterol    • History of drug use     PATIENT REPORTS NO USE FOR OVER 15 YEARS   • Hypertension    • Mobility impaired     REPORTS WALKS VERY LITTLE.  REPORTS CAN'T FEEL HANDS AND FEET DUR TO CHARCOT LEIGH TOOTH.  USES A WHEELCHAIR AND CAN TRANSFER FROM CHAIR TO CHAIR.     • Muscular dystrophies    • Stroke     PATIENT REPORTS QUESTIONABLE EPISODE 6-7 YEARS AGO AND THAT HE WAS FLOWN FROM [place] TO [place].   • Wears glasses    • Wears partial dentures     UPPER PLATE       No Known Allergies    Past Surgical History:   Procedure Laterality Date   • COLONOSCOPY N/A 9/12/2017    Procedure: COLONOSCOPY WITH POLYPECTOMY;  Surgeon: Geo Shannon MD;  Location: [facility] ENDOSCOPY;  Service:    • COLONOSCOPY W/ POLYPECTOMY     • ENDOSCOPY     • FRACTURE SURGERY Right     REPORTS HARDWARE IN PLACE   • FRACTURE SURGERY Bilateral     ANKLES, REPORTS HARDWARE IN PLACE IN BILATERAL ANKLES   • HERNIA REPAIR     • MOUTH SURGERY      EXTRACTIONS   • TONSILLECTOMY         History reviewed. No pertinent family history.    Social History     Social History   • Marital status:       Spouse name: N/A   • Number of children: N/A   • Years of education: N/A     Social History Main Topics   • Smoking status: Current Some Day Smoker     Packs/day: 0.25     Years: 45.00     Types: Cigarettes   • Smokeless tobacco: Never Used   • Alcohol use Yes      Comment: REPORTS NO USE IN OVER 15 YEARS   • Drug use: No   • Sexual activity: Defer     Other Topics Concern   • None     Social History Narrative   • None           Objective   Physical Exam   Constitutional: He is oriented to person, place, and time. He appears well-developed and well-nourished. No distress.   HENT:   Head: Normocephalic.   Eyes: Right eye exhibits no discharge. Left eye exhibits no discharge. No scleral icterus.   Cardiovascular: Normal rate, regular rhythm, normal heart sounds and intact distal pulses.  Exam reveals no gallop and no friction rub.    No murmur heard.  Pulmonary/Chest: Effort normal and breath sounds normal. No respiratory distress. He has no wheezes. He has no rales.   Abdominal: Soft. He exhibits no distension and no mass. There is no tenderness. There is no rebound and no guarding.   Musculoskeletal: He exhibits edema (non-pitting edema BLE L > R).   Neurological: He is alert and oriented to person, place, and time.   Skin: Skin is dry. He is not diaphoretic.   Cool BLE   Psychiatric: He has a normal mood and affect. His behavior is normal.   Nursing note and vitals reviewed.      ECG 12 Lead    Date/Time: 12/6/2017 1:10 PM  Performed by: DENTON BENTON  Authorized by: DENTON BENTON   Interpreted by physician  Rhythm: sinus rhythm  Rate: normal  QRS axis: normal  Conduction: conduction normal  Clinical impression: normal ECG               ED Course  ED Course                  MDM  52-year-old male here with bilateral lower extremity swelling and history of muscle dystrophy and recent change in medications. AFVSS. Exam w/ BLE swelling from feet to knees and L>R. Will send broad labs to evaluate his atypical  chest pain, US BLE to evaluate for DVTs and reassess.     2:17 PM Labs, US unremarkable. Will d/c home with recommendation that he continue Lasix and follow with his primary care doctor tomorrow as scheduled.  Discussed strict return to care precautions.    Final diagnoses:   Edema, unspecified type            Sabino Hamilton MD  12/06/17 [id number]

## 2017-12-06 NOTE — ED NOTES
PT MADE AWARE THAT MORE MEDICATION HAS BEEN ORDERED; HE REFUSES. WANTS TO BE D/C'D HOME.      Kristine Villafuerte RN  12/06/17 3635

## 2017-12-20 RX ORDER — AMITRIPTYLINE HYDROCHLORIDE 25 MG/1
TABLET, FILM COATED ORAL
Qty: 30 TABLET | Refills: 0 | Status: SHIPPED | OUTPATIENT
Start: 2017-12-20 | End: 2018-05-12

## 2018-01-19 ENCOUNTER — OFFICE VISIT (OUTPATIENT)
Dept: CARDIOLOGY | Facility: CLINIC | Age: 53
End: 2018-01-19

## 2018-01-19 VITALS
WEIGHT: 176 LBS | BODY MASS INDEX: 24.64 KG/M2 | HEIGHT: 71 IN | SYSTOLIC BLOOD PRESSURE: 122 MMHG | HEART RATE: 67 BPM | DIASTOLIC BLOOD PRESSURE: 80 MMHG

## 2018-01-19 DIAGNOSIS — E78.5 HYPERLIPIDEMIA LDL GOAL <70: ICD-10-CM

## 2018-01-19 DIAGNOSIS — I10 ESSENTIAL HYPERTENSION: ICD-10-CM

## 2018-01-19 DIAGNOSIS — R07.89 ATYPICAL CHEST PAIN: Primary | ICD-10-CM

## 2018-01-19 DIAGNOSIS — I25.119 CORONARY ARTERY DISEASE INVOLVING NATIVE CORONARY ARTERY OF NATIVE HEART WITH ANGINA PECTORIS (HCC): ICD-10-CM

## 2018-01-19 PROBLEM — I25.10 CORONARY ARTERY DISEASE INVOLVING NATIVE CORONARY ARTERY OF NATIVE HEART: Status: ACTIVE | Noted: 2018-01-19

## 2018-01-19 PROBLEM — Z12.11 SCREEN FOR COLON CANCER: Status: RESOLVED | Noted: 2017-08-23 | Resolved: 2018-01-19

## 2018-01-19 PROBLEM — G60.0 CMT (CHARCOT-MARIE-TOOTH DISEASE): Status: ACTIVE | Noted: 2018-01-19

## 2018-01-19 PROBLEM — Z72.0 TOBACCO ABUSE: Status: ACTIVE | Noted: 2018-01-19

## 2018-01-19 PROBLEM — J44.9 COPD (CHRONIC OBSTRUCTIVE PULMONARY DISEASE): Status: ACTIVE | Noted: 2018-01-19

## 2018-01-19 PROCEDURE — 99204 OFFICE O/P NEW MOD 45 MIN: CPT | Performed by: INTERNAL MEDICINE

## 2018-01-19 PROCEDURE — 93000 ELECTROCARDIOGRAM COMPLETE: CPT | Performed by: INTERNAL MEDICINE

## 2018-01-19 NOTE — ASSESSMENT & PLAN NOTE
· Symptoms are nonexertional and not relieved with nitroglycerin.  · Pharmacologic nuclear stress test will be performed to rule out obstructive CAD.

## 2018-01-19 NOTE — ASSESSMENT & PLAN NOTE
· Present symptomatology atypical for angina as it is not responsive to nitroglycerin and is not exertional in nature.  · Patient has multiple cardiac risk factors and ischemic evaluation is recommended.

## 2018-01-19 NOTE — PROGRESS NOTES
"Encounter Date:01/19/2018    Patient ID: Gene Lawton is a 52 y.o. male who resides in [place], [place].    CC/Reason for visit:  Shortness of Breath and Chest Pain            Gene Lawton is referred to my office by HEAVEN Moses, for atypical chest pain. He states that two times in the past, he was flown from [facility] to  for a \"heart problem\". The last time was at least two years ago. He is unsure if he had a cardiac catheterization either time. He was having chest pains both times he was flown up. They originally thought that he had a stroke because he presented with similar symptoms such as facial drooping. He notes that he can be laying down watching TV and have such severe substernal pain that it has almost caused him to pass out. He states it feels like an elephant is sitting on his chest. The most recent episode was two days ago. He did not take a Nitro at this time. He notes that he has taken Nitro for previous episodes, but does not believe it helped, although he did get a headache. Patient is not able to do physical activities due to his CMT. He does also note that when he gets worked up and upset, he can get some shortness of breath.    Review of Systems   Constitution: Positive for decreased appetite, fever and weight loss. Negative for weakness and malaise/fatigue.   HENT: Positive for tinnitus.    Eyes: Positive for vision loss in left eye, vision loss in right eye and visual disturbance.   Cardiovascular: Positive for chest pain and leg swelling. Negative for dyspnea on exertion, near-syncope, orthopnea, palpitations, paroxysmal nocturnal dyspnea and syncope.   Endocrine: Positive for cold intolerance.   Musculoskeletal: Positive for arthritis, back pain, falls, joint pain, joint swelling, muscle cramps, muscle weakness and stiffness. Negative for myalgias.   Neurological: Positive for disturbances in coordination, dizziness, headaches, loss of balance and numbness. Negative for brief " "paralysis, excessive daytime sleepiness, focal weakness and paresthesias.   Psychiatric/Behavioral: The patient is nervous/anxious.    All other systems reviewed and are negative.      The patient's past medical, social, family history and ROS reviewed in the patient's electronic medical record.    Allergies  Review of patient's allergies indicates no known allergies.    Outpatient Prescriptions Marked as Taking for the 1/19/18 encounter (Office Visit) with Allan Bernstein IV, MD   Medication Sig Dispense Refill   • amitriptyline (ELAVIL) 50 MG tablet Take 50 mg by mouth Every Night.     • aspirin 81 MG EC tablet Take 81 mg by mouth Daily.           Blood pressure 122/80, pulse 67, height 180.3 cm (71\"), weight 79.8 kg (176 lb).  Body mass index is 24.55 kg/(m^2).    Physical Exam   Constitutional: He is oriented to person, place, and time. He appears well-developed and well-nourished.   HENT:   Head: Normocephalic and atraumatic.   Eyes: Pupils are equal, round, and reactive to light. No scleral icterus.   Neck: No JVD present. Carotid bruit is not present. No thyromegaly present.   Cardiovascular: Normal rate and regular rhythm.  Exam reveals no gallop.    No murmur heard.  Pulmonary/Chest: Effort normal and breath sounds normal.   Abdominal: Soft. He exhibits no distension. There is no hepatosplenomegaly.   Musculoskeletal: He exhibits no edema.   Neurological: He is alert and oriented to person, place, and time.   Skin: Skin is warm and dry.   Psychiatric: He has a normal mood and affect. His behavior is normal.       Data Review:     Lab Results   Component Value Date    GLUCOSE 103 (H) 12/06/2017    BUN 12 12/06/2017    CREATININE 1.00 12/06/2017    EGFRIFNONA 78 12/06/2017    BCR 12.0 12/06/2017    K 4.0 12/06/2017    CO2 26.0 12/06/2017    CALCIUM 9.7 12/06/2017    ALBUMIN 4.40 12/06/2017    LABIL2 1.4 12/06/2017    AST 44 12/06/2017    ALT 82 (H) 12/06/2017     Lab Results   Component Value Date    " WBC 10.69 12/06/2017    HGB 13.8 (L) 12/06/2017    HCT 39.5 (L) 12/06/2017    MCV 93.6 12/06/2017     12/06/2017       ECG 12 Lead  Date/Time: 1/19/2018 11:52 AM  Performed by: ALLAN BERNSTEIN IV  Authorized by: ALLAN BERNSTEIN IV   Rhythm: sinus rhythm  BPM: 68  Clinical impression: normal ECG               Problem List Items Addressed This Visit        Cardiovascular and Mediastinum    Coronary artery disease involving native coronary artery of native heart with angina pectoris    Overview     · History of previous myocardial infarction with hospitalization at , data deficit         Current Assessment & Plan     · Present symptomatology atypical for angina as it is not responsive to nitroglycerin and is not exertional in nature.  · Patient has multiple cardiac risk factors and ischemic evaluation is recommended.         Relevant Orders    ECG 12 Lead    Essential hypertension    Current Assessment & Plan     · Controlled  · Continue present medical therapy         Hyperlipidemia LDL goal <70    Overview     · High intensity statin therapy indicated given the history of myocardial infarction         Current Assessment & Plan     · Continue atorvastatin            Nervous and Auditory    Atypical chest pain - Primary    Overview     · Normal EKG, 1/19/18         Current Assessment & Plan     · Symptoms are nonexertional and not relieved with nitroglycerin.  · Pharmacologic nuclear stress test will be performed to rule out obstructive CAD.         Relevant Orders    ECG 12 Lead    Adult Transthoracic Echo Complete W/ Cont if Necessary Per Protocol    Stress Test With Myocardial Perfusion One Day               · Echo  · Pharmacologic nuclear stress test  · Obtain records from  regarding previous myocardial infarction  · If the above studies are normal, I suspect his symptoms are related to chronic pain or GI etiology  · Tobacco cessation strongly recommended    Allan Bernstein  MD AMANDA  1/19/2018     Scribed for Allan Bernstein IV, MD by Chandrakant Thomas. 1/19/2018  2:23 PM

## 2018-01-31 ENCOUNTER — APPOINTMENT (OUTPATIENT)
Dept: CARDIOLOGY | Facility: HOSPITAL | Age: 53
End: 2018-01-31
Attending: INTERNAL MEDICINE

## 2018-02-07 ENCOUNTER — APPOINTMENT (OUTPATIENT)
Dept: CARDIOLOGY | Facility: HOSPITAL | Age: 53
End: 2018-02-07
Attending: INTERNAL MEDICINE

## 2018-02-08 ENCOUNTER — APPOINTMENT (OUTPATIENT)
Dept: CARDIOLOGY | Facility: HOSPITAL | Age: 53
End: 2018-02-08
Attending: INTERNAL MEDICINE

## 2018-02-09 ENCOUNTER — HOSPITAL ENCOUNTER (OUTPATIENT)
Dept: CARDIOLOGY | Facility: HOSPITAL | Age: 53
Discharge: HOME | End: 2018-02-09
Attending: INTERNAL MEDICINE

## 2018-02-09 ENCOUNTER — HOSPITAL ENCOUNTER (OUTPATIENT)
Dept: CARDIOLOGY | Facility: HOSPITAL | Age: 53
Discharge: HOME | End: 2018-02-09
Attending: INTERNAL MEDICINE | Admitting: INTERNAL MEDICINE

## 2018-02-09 VITALS
DIASTOLIC BLOOD PRESSURE: 90 MMHG | WEIGHT: 174.16 LBS | SYSTOLIC BLOOD PRESSURE: 145 MMHG | HEIGHT: 71 IN | BODY MASS INDEX: 24.38 KG/M2

## 2018-02-09 DIAGNOSIS — R07.89 ATYPICAL CHEST PAIN: ICD-10-CM

## 2018-02-09 LAB
BH CV ECHO MEAS - AO ROOT AREA (BSA CORRECTED): 1.6
BH CV ECHO MEAS - AO ROOT AREA: 8.3 CM^2
BH CV ECHO MEAS - AO ROOT DIAM: 3.3 CM
BH CV ECHO MEAS - BSA(HAYCOCK): 2 M^2
BH CV ECHO MEAS - BSA: 2 M^2
BH CV ECHO MEAS - BZI_BMI: 24.5 KILOGRAMS/M^2
BH CV ECHO MEAS - BZI_METRIC_HEIGHT: 180.3 CM
BH CV ECHO MEAS - BZI_METRIC_WEIGHT: 79.8 KG
BH CV ECHO MEAS - CONTRAST EF (2CH): 59.2 ML/M^2
BH CV ECHO MEAS - CONTRAST EF 4CH: 59.3 ML/M^2
BH CV ECHO MEAS - EDV(CUBED): 138.2 ML
BH CV ECHO MEAS - EDV(MOD-SP2): 98 ML
BH CV ECHO MEAS - EDV(MOD-SP4): 81 ML
BH CV ECHO MEAS - EDV(TEICH): 127.8 ML
BH CV ECHO MEAS - EF(CUBED): 63.9 %
BH CV ECHO MEAS - EF(MOD-SP2): 59.2 %
BH CV ECHO MEAS - EF(MOD-SP4): 59.3 %
BH CV ECHO MEAS - EF(TEICH): 55.1 %
BH CV ECHO MEAS - ESV(CUBED): 49.8 ML
BH CV ECHO MEAS - ESV(MOD-SP2): 40 ML
BH CV ECHO MEAS - ESV(MOD-SP4): 33 ML
BH CV ECHO MEAS - ESV(TEICH): 57.4 ML
BH CV ECHO MEAS - FS: 28.8 %
BH CV ECHO MEAS - IVS/LVPW: 1.3
BH CV ECHO MEAS - IVSD: 0.93 CM
BH CV ECHO MEAS - LA DIMENSION: 3.5 CM
BH CV ECHO MEAS - LA/AO: 1.1
BH CV ECHO MEAS - LV DIASTOLIC VOL/BSA (35-75): 40.6 ML/M^2
BH CV ECHO MEAS - LV MASS(C)D: 149.4 GRAMS
BH CV ECHO MEAS - LV MASS(C)DI: 74.8 GRAMS/M^2
BH CV ECHO MEAS - LV MAX PG: 2.4 MMHG
BH CV ECHO MEAS - LV MEAN PG: 1 MMHG
BH CV ECHO MEAS - LV SYSTOLIC VOL/BSA (12-30): 16.5 ML/M^2
BH CV ECHO MEAS - LV V1 MAX: 76.7 CM/SEC
BH CV ECHO MEAS - LV V1 MEAN: 48.8 CM/SEC
BH CV ECHO MEAS - LV V1 VTI: 16.5 CM
BH CV ECHO MEAS - LVIDD: 5.2 CM
BH CV ECHO MEAS - LVIDS: 3.7 CM
BH CV ECHO MEAS - LVLD AP2: 7.5 CM
BH CV ECHO MEAS - LVLD AP4: 7.3 CM
BH CV ECHO MEAS - LVLS AP2: 6 CM
BH CV ECHO MEAS - LVLS AP4: 5.5 CM
BH CV ECHO MEAS - LVOT AREA (M): 3.8 CM^2
BH CV ECHO MEAS - LVOT AREA: 3.8 CM^2
BH CV ECHO MEAS - LVOT DIAM: 2.2 CM
BH CV ECHO MEAS - LVPWD: 0.72 CM
BH CV ECHO MEAS - RAP SYSTOLE: 8 MMHG
BH CV ECHO MEAS - RVDD: 2.7 CM
BH CV ECHO MEAS - RVSP: 28.8 MMHG
BH CV ECHO MEAS - SI(CUBED): 44.2 ML/M^2
BH CV ECHO MEAS - SI(LVOT): 31.4 ML/M^2
BH CV ECHO MEAS - SI(MOD-SP2): 29 ML/M^2
BH CV ECHO MEAS - SI(MOD-SP4): 24 ML/M^2
BH CV ECHO MEAS - SI(TEICH): 35.2 ML/M^2
BH CV ECHO MEAS - SV(CUBED): 88.4 ML
BH CV ECHO MEAS - SV(LVOT): 62.7 ML
BH CV ECHO MEAS - SV(MOD-SP2): 58 ML
BH CV ECHO MEAS - SV(MOD-SP4): 48 ML
BH CV ECHO MEAS - SV(TEICH): 70.4 ML
BH CV ECHO MEAS - TAPSE (>1.6): 2.9 CM2
BH CV ECHO MEAS - TR MAX VEL: 228 CM/SEC
BH CV VAS BP LEFT ARM: NORMAL MMHG
BH CV XLRA - RV BASE: 3.7 CM
BH CV XLRA - RV LENGTH: 5.2 CM
BH CV XLRA - RV MID: 3.4 CM
LEFT ATRIUM VOLUME INDEX: 34 ML/M2
LV EF 2D ECHO EST: 55 %

## 2018-02-09 PROCEDURE — 93018 CV STRESS TEST I&R ONLY: CPT | Performed by: INTERNAL MEDICINE

## 2018-02-09 PROCEDURE — 93306 TTE W/DOPPLER COMPLETE: CPT | Performed by: INTERNAL MEDICINE

## 2018-02-09 PROCEDURE — A9500 TC99M SESTAMIBI: HCPCS | Performed by: INTERNAL MEDICINE

## 2018-02-09 PROCEDURE — 93306 TTE W/DOPPLER COMPLETE: CPT

## 2018-02-09 PROCEDURE — 78452 HT MUSCLE IMAGE SPECT MULT: CPT

## 2018-02-09 PROCEDURE — 0 TECHNETIUM SESTAMIBI: Performed by: INTERNAL MEDICINE

## 2018-02-09 PROCEDURE — 93017 CV STRESS TEST TRACING ONLY: CPT

## 2018-02-09 PROCEDURE — 25010000002 REGADENOSON 0.4 MG/5ML SOLUTION: Performed by: INTERNAL MEDICINE

## 2018-02-09 PROCEDURE — 78452 HT MUSCLE IMAGE SPECT MULT: CPT | Performed by: INTERNAL MEDICINE

## 2018-02-09 RX ADMIN — REGADENOSON 0.4 MG: 0.08 INJECTION, SOLUTION INTRAVENOUS at 10:05

## 2018-02-09 RX ADMIN — TECHNETIUM TC 99M SESTAMIBI 1 DOSE: 1 INJECTION INTRAVENOUS at 08:30

## 2018-02-09 RX ADMIN — TECHNETIUM TC 99M SESTAMIBI 1 DOSE: 1 INJECTION INTRAVENOUS at 10:00

## 2018-02-13 ENCOUNTER — TELEPHONE (OUTPATIENT)
Dept: NUCLEAR MEDICINE | Facility: HOSPITAL | Age: 53
End: 2018-02-13

## 2018-02-13 LAB
BH CV NUCLEAR PRIOR STUDY: 2
BH CV STRESS BP STAGE 1: NORMAL
BH CV STRESS BP STAGE 2: NORMAL
BH CV STRESS BP STAGE 3: NORMAL
BH CV STRESS COMMENTS STAGE 1: NORMAL
BH CV STRESS COMMENTS STAGE 2: NORMAL
BH CV STRESS DOSE REGADENOSON STAGE 1: 0.4
BH CV STRESS DURATION MIN STAGE 1: 1
BH CV STRESS DURATION MIN STAGE 2: 3
BH CV STRESS DURATION MIN STAGE 3: 2
BH CV STRESS DURATION SEC STAGE 1: 0
BH CV STRESS DURATION SEC STAGE 2: 0
BH CV STRESS DURATION SEC STAGE 3: 0
BH CV STRESS HR STAGE 1: 61
BH CV STRESS HR STAGE 2: 83
BH CV STRESS HR STAGE 3: 75
BH CV STRESS PROTOCOL 1: NORMAL
BH CV STRESS RECOVERY BP: NORMAL MMHG
BH CV STRESS RECOVERY HR: 75 BPM
BH CV STRESS STAGE 1: 1
BH CV STRESS STAGE 2: 2
BH CV STRESS STAGE 3: 3
LV EF NUC BP: 47 %
MAXIMAL PREDICTED HEART RATE: 166 BPM
PERCENT MAX PREDICTED HR: 56.02 %
STRESS BASELINE BP: NORMAL MMHG
STRESS BASELINE HR: 58 BPM
STRESS PERCENT HR: 66 %
STRESS POST PEAK BP: NORMAL MMHG
STRESS POST PEAK HR: 93 BPM
STRESS TARGET HR: 141 BPM

## 2018-02-13 NOTE — TELEPHONE ENCOUNTER
"I called the patient to review his stress test results. He became irate and said he would, \"see us in court\" when I told him his stress test was normal.   "

## 2018-02-13 NOTE — TELEPHONE ENCOUNTER
patient called to state he was feeling bad, and having double vision from his Stress Test that was done here yesterday.  Robyn and I both said that he did not have it done here, he had it done at Dr. Herman's office.  We gave him the office number

## 2018-05-16 ENCOUNTER — TELEPHONE (OUTPATIENT)
Dept: PRIMARY CARE CLINIC | Age: 53
End: 2018-05-16

## 2018-05-16 RX ORDER — NITROGLYCERIN 0.4 MG/1
TABLET SUBLINGUAL
Qty: 25 TABLET | Refills: 3 | OUTPATIENT
Start: 2018-05-16

## 2018-05-24 ENCOUNTER — OFFICE VISIT (OUTPATIENT)
Dept: PRIMARY CARE CLINIC | Age: 53
End: 2018-05-24
Payer: MEDICARE

## 2018-05-24 VITALS
HEIGHT: 71 IN | HEART RATE: 77 BPM | OXYGEN SATURATION: 98 % | DIASTOLIC BLOOD PRESSURE: 84 MMHG | SYSTOLIC BLOOD PRESSURE: 124 MMHG | RESPIRATION RATE: 20 BRPM

## 2018-05-24 DIAGNOSIS — G60.0 CHARCOT-MARIE-TOOTH ATROPHY: ICD-10-CM

## 2018-05-24 DIAGNOSIS — I25.10 CORONARY ARTERY DISEASE INVOLVING NATIVE CORONARY ARTERY OF NATIVE HEART, ANGINA PRESENCE UNSPECIFIED: ICD-10-CM

## 2018-05-24 DIAGNOSIS — G62.9 NEUROPATHY: ICD-10-CM

## 2018-05-24 DIAGNOSIS — J41.0 SIMPLE CHRONIC BRONCHITIS (HCC): ICD-10-CM

## 2018-05-24 DIAGNOSIS — G89.4 CHRONIC PAIN SYNDROME: Primary | ICD-10-CM

## 2018-05-24 PROCEDURE — G8926 SPIRO NO PERF OR DOC: HCPCS | Performed by: PEDIATRICS

## 2018-05-24 PROCEDURE — 99214 OFFICE O/P EST MOD 30 MIN: CPT | Performed by: PEDIATRICS

## 2018-05-24 PROCEDURE — G8420 CALC BMI NORM PARAMETERS: HCPCS | Performed by: PEDIATRICS

## 2018-05-24 PROCEDURE — G8427 DOCREV CUR MEDS BY ELIG CLIN: HCPCS | Performed by: PEDIATRICS

## 2018-05-24 PROCEDURE — 3023F SPIROM DOC REV: CPT | Performed by: PEDIATRICS

## 2018-05-24 PROCEDURE — G8598 ASA/ANTIPLAT THER USED: HCPCS | Performed by: PEDIATRICS

## 2018-05-24 PROCEDURE — 4004F PT TOBACCO SCREEN RCVD TLK: CPT | Performed by: PEDIATRICS

## 2018-05-24 PROCEDURE — 3017F COLORECTAL CA SCREEN DOC REV: CPT | Performed by: PEDIATRICS

## 2018-05-24 RX ORDER — GABAPENTIN 600 MG/1
600 TABLET ORAL 3 TIMES DAILY
Qty: 90 TABLET | Refills: 1 | Status: SHIPPED | OUTPATIENT
Start: 2018-05-24 | End: 2018-06-23 | Stop reason: SDUPTHER

## 2018-05-24 RX ORDER — TRAMADOL HYDROCHLORIDE 50 MG/1
50 TABLET ORAL 3 TIMES DAILY
Status: CANCELLED | OUTPATIENT
Start: 2018-05-24

## 2018-05-24 RX ORDER — TRAMADOL HYDROCHLORIDE 50 MG/1
50 TABLET ORAL 3 TIMES DAILY
COMMUNITY
End: 2018-07-24 | Stop reason: ALTCHOICE

## 2018-05-24 RX ORDER — NITROGLYCERIN 0.4 MG/1
TABLET SUBLINGUAL
Qty: 25 TABLET | Refills: 0 | Status: SHIPPED | OUTPATIENT
Start: 2018-05-24 | End: 2020-04-09

## 2018-05-24 RX ORDER — GABAPENTIN 600 MG/1
600 TABLET ORAL 3 TIMES DAILY
COMMUNITY
End: 2018-05-24 | Stop reason: SDUPTHER

## 2018-05-24 RX ORDER — HYDROCODONE BITARTRATE AND ACETAMINOPHEN 10; 325 MG/1; MG/1
1 TABLET ORAL 4 TIMES DAILY
COMMUNITY
End: 2018-05-24 | Stop reason: SDUPTHER

## 2018-05-24 RX ORDER — ASPIRIN 81 MG/1
81 TABLET ORAL DAILY
Qty: 30 TABLET | Refills: 3 | Status: SHIPPED | OUTPATIENT
Start: 2018-05-24 | End: 2018-07-24 | Stop reason: SDUPTHER

## 2018-05-24 RX ORDER — HYDROCODONE BITARTRATE AND ACETAMINOPHEN 10; 325 MG/1; MG/1
1 TABLET ORAL EVERY 8 HOURS PRN
Qty: 90 TABLET | Refills: 0 | Status: SHIPPED | OUTPATIENT
Start: 2018-05-24 | End: 2018-06-22 | Stop reason: SDUPTHER

## 2018-05-24 RX ORDER — ALBUTEROL SULFATE 90 UG/1
2 AEROSOL, METERED RESPIRATORY (INHALATION) EVERY 6 HOURS PRN
Qty: 1 INHALER | Refills: 3 | Status: SHIPPED | OUTPATIENT
Start: 2018-05-24 | End: 2018-07-24 | Stop reason: SDUPTHER

## 2018-05-24 RX ORDER — NITROGLYCERIN 0.4 MG/1
TABLET SUBLINGUAL
Qty: 25 TABLET | Refills: 3 | Status: SHIPPED | OUTPATIENT
Start: 2018-05-24 | End: 2018-05-24 | Stop reason: SDUPTHER

## 2018-05-24 ASSESSMENT — ENCOUNTER SYMPTOMS
VOMITING: 0
SHORTNESS OF BREATH: 1
BACK PAIN: 1
EYE DISCHARGE: 0
WHEEZING: 1
NAUSEA: 0
COUGH: 1
SINUS PRESSURE: 0
ABDOMINAL PAIN: 0

## 2018-05-24 ASSESSMENT — COPD QUESTIONNAIRES: COPD: 1

## 2018-06-07 DIAGNOSIS — G60.0 CHARCOT-MARIE-TOOTH ATROPHY: ICD-10-CM

## 2018-06-07 DIAGNOSIS — G89.4 CHRONIC PAIN SYNDROME: ICD-10-CM

## 2018-06-07 DIAGNOSIS — G62.9 NEUROPATHY: ICD-10-CM

## 2018-06-11 DIAGNOSIS — G89.4 CHRONIC PAIN SYNDROME: ICD-10-CM

## 2018-06-12 RX ORDER — TRAMADOL HYDROCHLORIDE 50 MG/1
50 TABLET ORAL 3 TIMES DAILY
Qty: 90 TABLET | Refills: 0 | OUTPATIENT
Start: 2018-06-12

## 2018-06-22 DIAGNOSIS — G89.4 CHRONIC PAIN SYNDROME: ICD-10-CM

## 2018-06-22 DIAGNOSIS — G60.0 CHARCOT-MARIE-TOOTH ATROPHY: ICD-10-CM

## 2018-06-22 DIAGNOSIS — G62.9 NEUROPATHY: ICD-10-CM

## 2018-06-22 RX ORDER — HYDROCODONE BITARTRATE AND ACETAMINOPHEN 10; 325 MG/1; MG/1
1 TABLET ORAL EVERY 8 HOURS PRN
Qty: 90 TABLET | Refills: 0 | Status: SHIPPED | OUTPATIENT
Start: 2018-06-22 | End: 2018-07-24

## 2018-07-24 ENCOUNTER — OFFICE VISIT (OUTPATIENT)
Dept: PRIMARY CARE CLINIC | Age: 53
End: 2018-07-24
Payer: MEDICARE

## 2018-07-24 VITALS
DIASTOLIC BLOOD PRESSURE: 76 MMHG | RESPIRATION RATE: 20 BRPM | HEIGHT: 71 IN | SYSTOLIC BLOOD PRESSURE: 122 MMHG | HEART RATE: 69 BPM | OXYGEN SATURATION: 98 %

## 2018-07-24 DIAGNOSIS — G62.9 NEUROPATHY: ICD-10-CM

## 2018-07-24 DIAGNOSIS — J41.0 SIMPLE CHRONIC BRONCHITIS (HCC): ICD-10-CM

## 2018-07-24 DIAGNOSIS — G89.4 CHRONIC PAIN SYNDROME: ICD-10-CM

## 2018-07-24 DIAGNOSIS — G60.0 CHARCOT-MARIE-TOOTH ATROPHY: Primary | ICD-10-CM

## 2018-07-24 DIAGNOSIS — F17.219 NICOTINE DEPENDENCE, CIGARETTES, W UNSP DISORDERS: ICD-10-CM

## 2018-07-24 DIAGNOSIS — I25.10 CORONARY ARTERY DISEASE INVOLVING NATIVE CORONARY ARTERY OF NATIVE HEART, ANGINA PRESENCE UNSPECIFIED: ICD-10-CM

## 2018-07-24 PROCEDURE — G8420 CALC BMI NORM PARAMETERS: HCPCS | Performed by: PEDIATRICS

## 2018-07-24 PROCEDURE — G8598 ASA/ANTIPLAT THER USED: HCPCS | Performed by: PEDIATRICS

## 2018-07-24 PROCEDURE — 3017F COLORECTAL CA SCREEN DOC REV: CPT | Performed by: PEDIATRICS

## 2018-07-24 PROCEDURE — 99214 OFFICE O/P EST MOD 30 MIN: CPT | Performed by: PEDIATRICS

## 2018-07-24 PROCEDURE — G8926 SPIRO NO PERF OR DOC: HCPCS | Performed by: PEDIATRICS

## 2018-07-24 PROCEDURE — G8427 DOCREV CUR MEDS BY ELIG CLIN: HCPCS | Performed by: PEDIATRICS

## 2018-07-24 PROCEDURE — 99406 BEHAV CHNG SMOKING 3-10 MIN: CPT | Performed by: PEDIATRICS

## 2018-07-24 PROCEDURE — 3023F SPIROM DOC REV: CPT | Performed by: PEDIATRICS

## 2018-07-24 PROCEDURE — 4004F PT TOBACCO SCREEN RCVD TLK: CPT | Performed by: PEDIATRICS

## 2018-07-24 RX ORDER — ALBUTEROL SULFATE 90 UG/1
2 AEROSOL, METERED RESPIRATORY (INHALATION) EVERY 6 HOURS PRN
Qty: 1 INHALER | Refills: 3 | Status: SHIPPED | OUTPATIENT
Start: 2018-07-24 | End: 2020-04-09

## 2018-07-24 RX ORDER — ASPIRIN 81 MG/1
81 TABLET ORAL DAILY
Qty: 30 TABLET | Refills: 3 | Status: SHIPPED | OUTPATIENT
Start: 2018-07-24 | End: 2020-04-09

## 2018-07-24 RX ORDER — HYDROCODONE BITARTRATE AND ACETAMINOPHEN 10; 325 MG/1; MG/1
1 TABLET ORAL 3 TIMES DAILY PRN
COMMUNITY
End: 2018-07-24

## 2018-07-24 RX ORDER — HYDROCODONE BITARTRATE AND ACETAMINOPHEN 7.5; 325 MG/1; MG/1
1 TABLET ORAL EVERY 8 HOURS PRN
Qty: 90 TABLET | Refills: 0 | Status: SHIPPED | OUTPATIENT
Start: 2018-07-24 | End: 2018-08-22 | Stop reason: SDUPTHER

## 2018-07-24 RX ORDER — GABAPENTIN 600 MG/1
600 TABLET ORAL 3 TIMES DAILY
Qty: 90 TABLET | Refills: 2 | Status: SHIPPED | OUTPATIENT
Start: 2018-07-24 | End: 2019-05-22

## 2018-07-24 ASSESSMENT — ENCOUNTER SYMPTOMS
VOMITING: 0
SINUS PRESSURE: 0
COUGH: 0
ABDOMINAL PAIN: 0
EYE DISCHARGE: 0
SHORTNESS OF BREATH: 0
NAUSEA: 0
WHEEZING: 0
BACK PAIN: 0

## 2018-07-24 ASSESSMENT — COPD QUESTIONNAIRES: COPD: 1

## 2018-07-24 NOTE — PATIENT INSTRUCTIONS
· Keep a list of your medicines with you. List all of the prescription medicines, nonprescription medicines, supplements, natural remedies, and vitamins that you take. Tell your healthcare providers who treat you about all of the products you are taking. Your provider can provide you with a form to keep track of them. Just ask. · Follow the directions that come with your medicine, including information about food or alcohol. Make sure you know how and when to take your medicine. Do not take more or less than you are supposed to take. · Keep all medicines out of the reach of children. · Store medicines according to the directions on the label. · Monitor yourself. Learn to know how your body reacts to your new medicine and keep track of how it makes you feel before attempting (If your provider has allowed you to do so) to drive or go to work. · Seek emergency medical attention if you think you have used too much of this medicine. An overdose of any prescription medicine can be fatal. Overdose symptoms may include extreme drowsiness, muscle weakness, confusion, cold and clammy skin, pinpoint pupils, shallow breathing, slow heart rate, fainting, or coma. · Don't share prescription medicines with others, even when they seem to have the same symptoms. What may be good for you may be harmful to others. · If you are no longer taking a prescribed medication and you have pills left please take your pills out of their original containers. Mix crushed pills with an undesirable substance, such as cat litter or used coffee grounds. Put the mixture into a disposable container with a lid, such as an empty margarine tub, or into a sealable bag. Cover up or remove any of your personal information on the empty containers by covering it with black permanent marker or duct tape. Place the sealed container with the mixture, and the empty drug containers, in the trash.    · If you use a medication that is in the form of a patch, yourself or someone else. Estimate the cost in terms of packs of cigarettes, and put the money aside to buy these presents. Keep very busy on the big day. Go to the movies, exercise, take long walks, or go bike riding. Remind your family and friends that this is your quit date, and ask them to help you over the rough spots of the first couple of days and weeks. Buy yourself a treat or do something special to celebrate. Telephone and Internet Support   Telephone, web-, and computer-based programs can offer you the support that you need to quit and to stay smoke-free. You can find many programs online, like the [employer] Association's [place] from Smoking . Immediately After Quitting   Develop a clean, fresh, nonsmoking environment around yourselfat work and at home. Buy yourself flowersyou may be surprised how much you can enjoy their scent now. The first few days after you quit, spend as much free time as possible in places where smoking isn't allowed, such as [address] [place] [address], museums, theaters, department stores, and churches. Drink large quantities of water and fruit juice (but avoid sodas that contain caffeine). Try to avoid alcohol, coffee, and other beverages that you associate with cigarette smoking. Strike up conversation instead of a match for a cigarette. If you miss the sensation of having a cigarette in your hand, play with something elsea pencil, a paper clip, a marble. If you miss having something in your mouth, try toothpicks or a fake cigarette.

## 2018-07-24 NOTE — PROGRESS NOTES
Pt had colonoscopy less than 1 year ago with Dr. Vamshi Enriquez in [place]. Pt does not want pneumo shot at this time.

## 2018-07-24 NOTE — PROGRESS NOTES
Spouse name: N/A    Number of children: N/A    Years of education: N/A     Social History Main Topics    Smoking status: Current Every Day Smoker     Packs/day: 0.50     Years: 40.00     Types: Cigarettes    Smokeless tobacco: Never Used    Alcohol use No    Drug use: No    Sexual activity: Yes     Partners: Female     Other Topics Concern    None     Social History Narrative    None       OBJECTIVE:    Vitals:    07/24/18 0939   BP: 122/76   Site: Right Arm   Position: Sitting   Pulse: 69   Resp: 20   SpO2: 98%   Height: 5' 11\" (1.803 m)     Physical Exam   Constitutional: He is oriented to person, place, and time. He appears well-developed and well-nourished. No distress. HENT:   Head: Normocephalic and atraumatic. Right Ear: External ear normal.   Left Ear: External ear normal.   Mouth/Throat: Oropharynx is clear and moist.   Eyes: Conjunctivae and EOM are normal. Pupils are equal, round, and reactive to light. Right eye exhibits no discharge. Left eye exhibits no discharge. No scleral icterus. Neck: Normal range of motion. Neck supple. No thyromegaly present. Cardiovascular: Normal rate, regular rhythm and normal heart sounds. No murmur heard. Pulmonary/Chest: Effort normal and breath sounds normal. No respiratory distress. He has no wheezes. He has no rales. Abdominal: Soft. Bowel sounds are normal. He exhibits no distension. There is no tenderness. Musculoskeletal: He exhibits no edema or tenderness. Global weakness, muscle atrophy, bilateral foot drop, bilateral wrist drop   Lymphadenopathy:     He has no cervical adenopathy. Neurological: He is alert and oriented to person, place, and time. Skin: Skin is warm. No rash noted. No erythema. Psychiatric: He has a normal mood and affect. Nursing note and vitals reviewed. No results found for requested labs within last 30 days.        Microscopic Examination (no units)   Date Value   02/13/2018 Not Indicated     LDL Calculated (mg/dL)   Date Value   04/17/2017 88         Lab Results   Component Value Date    WBC 7.5 02/13/2018    NEUTROABS 3.3 02/13/2018    HGB 13.5 02/13/2018    HCT 40.7 02/13/2018    MCV 96.2 02/13/2018     02/13/2018       Lab Results   Component Value Date    TSH 1.29 07/23/2015       Current Outpatient Prescriptions   Medication Sig Dispense Refill    HYDROcodone-acetaminophen (LORTAB) 7.5-325 MG per tablet Take 1 tablet by mouth every 8 hours as needed for Pain for up to 30 days. . 90 tablet 0    aspirin 81 MG EC tablet Take 1 tablet by mouth daily 30 tablet 3    gabapentin (NEURONTIN) 600 MG tablet Take 1 tablet by mouth 3 times daily for 30 days. . 90 tablet 2    albuterol sulfate  (90 Base) MCG/ACT inhaler Inhale 2 puffs into the lungs every 6 hours as needed for Wheezing 1 Inhaler 3    tiotropium (SPIRIVA RESPIMAT) 1.25 MCG/ACT AERS inhaler Inhale 2 puffs into the lungs daily 1 Inhaler 3    nitroGLYCERIN (NITROSTAT) 0.4 MG SL tablet up to max of 3 total doses. If no relief after 1 dose, call 911. 25 tablet 0     No current facility-administered medications for this visit. During this visit the following were done:  Labs reviewed []    Labs ordered []    Radiology reports Reviewed []    Radiology ordered []    EKG, echo, and/or stress test reviewed []    EEG results reviewed  []    EEG reviewed and interpreted per myself   []    Previous provider/old records requested  []    Previous provider Records Reviewed []    ER records Reviewed []    Hospital records Reviewed []    History obtained From Family []    Radiological images view and Interpreted per myself []      ASSESSMENT/PLAN:    Ceci Ballesteros was seen today for other, coronary artery disease and pain. Diagnoses and all orders for this visit:    Charcot-Lakia-Tooth atrophy  -     HYDROcodone-acetaminophen (LORTAB) 7.5-325 MG per tablet; Take 1 tablet by mouth every 8 hours as needed for Pain for up to 30 days. .  -     gabapentin (NEURONTIN) 600 MG tablet; Take 1 tablet by mouth 3 times daily for 30 days. .    Chronic pain syndrome  -     HYDROcodone-acetaminophen (LORTAB) 7.5-325 MG per tablet; Take 1 tablet by mouth every 8 hours as needed for Pain for up to 30 days. .  -     gabapentin (NEURONTIN) 600 MG tablet; Take 1 tablet by mouth 3 times daily for 30 days. .    Coronary artery disease involving native coronary artery of native heart, angina presence unspecified  -     aspirin 81 MG EC tablet; Take 1 tablet by mouth daily    Simple chronic bronchitis (HCC)  -     albuterol sulfate  (90 Base) MCG/ACT inhaler; Inhale 2 puffs into the lungs every 6 hours as needed for Wheezing  -     tiotropium (SPIRIVA RESPIMAT) 1.25 MCG/ACT AERS inhaler; Inhale 2 puffs into the lungs daily    Nicotine dependence, cigarettes, w unsp disorders  -     CO TOBACCO USE CESSATION INTERMEDIATE 3-10 MINUTES    Neuropathy (HCC)  -     gabapentin (NEURONTIN) 600 MG tablet; Take 1 tablet by mouth 3 times daily for 30 days. Jensen Perdomo for history of colon polyps and Dr Ray Mccartney cardiology     Additional plan related to above diagnosis:  I have advised the patient that I will not be able to continue to write pain medications. He will need to follow with a pain clinic. My plan would be to taper down on these medication. I am not having him sign a medication contract today because I can not continue to give this medication. If he calls in 1 month and has not been able to establish with pain management then I will give rx for Norco 5 mg #90    Return in about 2 months (around 9/24/2018) for chronic pain, CAD, copd, neuropathy, Neurontin.     Controlled Substances Monitoring:

## 2018-08-21 DIAGNOSIS — G60.0 CHARCOT-MARIE-TOOTH ATROPHY: ICD-10-CM

## 2018-08-21 DIAGNOSIS — G89.4 CHRONIC PAIN SYNDROME: ICD-10-CM

## 2018-08-22 RX ORDER — HYDROCODONE BITARTRATE AND ACETAMINOPHEN 7.5; 325 MG/1; MG/1
1 TABLET ORAL EVERY 8 HOURS PRN
Qty: 90 TABLET | Refills: 0 | OUTPATIENT
Start: 2018-08-22 | End: 2018-09-21

## 2018-08-22 RX ORDER — HYDROCODONE BITARTRATE AND ACETAMINOPHEN 7.5; 325 MG/1; MG/1
1 TABLET ORAL EVERY 8 HOURS PRN
Qty: 9 TABLET | Refills: 0 | Status: SHIPPED | OUTPATIENT
Start: 2018-08-22 | End: 2018-08-25

## 2018-08-22 NOTE — TELEPHONE ENCOUNTER
Pt states he has an appt on 8/24/18 with pain management. States he needs some to get him through til then.

## 2018-08-23 ENCOUNTER — TELEPHONE (OUTPATIENT)
Dept: PRIMARY CARE CLINIC | Age: 53
End: 2018-08-23

## 2018-08-23 NOTE — TELEPHONE ENCOUNTER
Patricia Burger with Dr Connor's office called to ask about the patients demeanor. He is at their office for an appointment and is very persistent on what medication he wants. Pt told them that we were weaning him off of his medication and that we wouldn't write for any medication. Per Dr Flores Quiñones, she wrote for 3 days so that pt could make it until his appt. With the pain clinic. Patricia Burger stated that she will fax the office note as soon as it is completed.

## 2019-05-22 ENCOUNTER — HOSPITAL ENCOUNTER (EMERGENCY)
Facility: HOSPITAL | Age: 54
Discharge: HOME | End: 2019-05-22
Attending: EMERGENCY MEDICINE
Payer: MEDICARE

## 2019-05-22 VITALS
OXYGEN SATURATION: 98 % | TEMPERATURE: 97.6 F | DIASTOLIC BLOOD PRESSURE: 84 MMHG | SYSTOLIC BLOOD PRESSURE: 135 MMHG | HEART RATE: 65 BPM

## 2019-05-22 DIAGNOSIS — S05.01XA ABRASION OF RIGHT CORNEA, INITIAL ENCOUNTER: Primary | ICD-10-CM

## 2019-05-22 PROCEDURE — 6370000000 HC RX 637 (ALT 250 FOR IP): Performed by: EMERGENCY MEDICINE

## 2019-05-22 PROCEDURE — 99282 EMERGENCY DEPT VISIT SF MDM: CPT

## 2019-05-22 RX ORDER — TOBRAMYCIN 3 MG/ML
2 SOLUTION/ DROPS OPHTHALMIC ONCE
Status: COMPLETED | OUTPATIENT
Start: 2019-05-22 | End: 2019-05-22

## 2019-05-22 RX ADMIN — TOBRAMYCIN 2 DROP: 3 SOLUTION/ DROPS OPHTHALMIC at 12:35

## 2019-05-22 ASSESSMENT — PAIN DESCRIPTION - FREQUENCY: FREQUENCY: CONTINUOUS

## 2019-05-22 ASSESSMENT — ENCOUNTER SYMPTOMS
NAUSEA: 0
CHEST TIGHTNESS: 0
SINUS PRESSURE: 0
COUGH: 0
BACK PAIN: 0
EYE REDNESS: 0
EYE DISCHARGE: 0
DIARRHEA: 0
SORE THROAT: 0
TROUBLE SWALLOWING: 0
CONSTIPATION: 0
VOMITING: 0
WHEEZING: 0
EYE PAIN: 0
ABDOMINAL PAIN: 0
RHINORRHEA: 0
SHORTNESS OF BREATH: 0

## 2019-05-22 ASSESSMENT — PAIN DESCRIPTION - PAIN TYPE: TYPE: ACUTE PAIN

## 2019-05-22 ASSESSMENT — PAIN DESCRIPTION - ORIENTATION: ORIENTATION: LEFT

## 2019-05-22 ASSESSMENT — PAIN DESCRIPTION - LOCATION: LOCATION: EYE

## 2019-05-22 ASSESSMENT — PAIN SCALES - GENERAL: PAINLEVEL_OUTOF10: 7

## 2019-05-22 ASSESSMENT — PAIN DESCRIPTION - DESCRIPTORS: DESCRIPTORS: STABBING;THROBBING

## 2019-05-22 NOTE — ED PROVIDER NOTES
x2    Muscular dystrophies          SURGICAL HISTORY       Past Surgical History:   Procedure Laterality Date    ANKLE SURGERY      bilateral    ARM SURGERY Bilateral     Crush injury    BRAIN SURGERY      skull injury, metal palte in head    COLONOSCOPY      patient states colon cancer    FRACTURE SURGERY      JOINT REPLACEMENT      left wrist    TONSILLECTOMY           CURRENT MEDICATIONS       Previous Medications    ALBUTEROL SULFATE  (90 BASE) MCG/ACT INHALER    Inhale 2 puffs into the lungs every 6 hours as needed for Wheezing    ASPIRIN 81 MG EC TABLET    Take 1 tablet by mouth daily    NITROGLYCERIN (NITROSTAT) 0.4 MG SL TABLET    up to max of 3 total doses. If no relief after 1 dose, call 911. TIOTROPIUM (SPIRIVA RESPIMAT) 1.25 MCG/ACT AERS INHALER    Inhale 2 puffs into the lungs daily       ALLERGIES     Patient has no known allergies.     FAMILY HISTORY       Family History   Problem Relation Age of Onset    Other Father         charcot preston tooth    Heart Attack Father     Cancer Father         unknown    Heart Attack Brother           SOCIAL HISTORY       Social History     Socioeconomic History    Marital status:      Spouse name: None    Number of children: None    Years of education: None    Highest education level: None   Occupational History    None   Social Needs    Financial resource strain: None    Food insecurity:     Worry: None     Inability: None    Transportation needs:     Medical: None     Non-medical: None   Tobacco Use    Smoking status: Current Every Day Smoker     Packs/day: 0.50     Years: 40.00     Pack years: 20.00     Types: Cigarettes    Smokeless tobacco: Never Used   Substance and Sexual Activity    Alcohol use: No    Drug use: No    Sexual activity: Yes     Partners: Female   Lifestyle    Physical activity:     Days per week: None     Minutes per session: None    Stress: None   Relationships    Social connections:     Talks on phone: None     Gets together: None     Attends Zoroastrianism service: None     Active member of club or organization: None     Attends meetings of clubs or organizations: None     Relationship status: None    Intimate partner violence:     Fear of current or ex partner: None     Emotionally abused: None     Physically abused: None     Forced sexual activity: None   Other Topics Concern    None   Social History Narrative    None       SCREENINGS             PHYSICAL EXAM    (up to 7 for level 4, 8 or more for level 5)     ED Triage Vitals [05/22/19 1219]   BP Temp Temp Source Pulse Resp SpO2 Height Weight   -- 97.6 °F (36.4 °C) Oral 75 -- 98 % -- --       Physical Exam   Constitutional: He is oriented to person, place, and time. He appears well-developed and well-nourished. HENT:   Head: Normocephalic and atraumatic. Eyes: Pupils are equal, round, and reactive to light. Conjunctivae and EOM are normal. Right eye exhibits no discharge. Left eye exhibits no discharge. Right eye-no FOB, corneal abrasion seen per fluorescein dye. Neck: Neck supple. Cardiovascular: Normal rate and regular rhythm. Pulmonary/Chest: Effort normal and breath sounds normal.   Abdominal: Soft. Bowel sounds are normal.   Musculoskeletal: Normal range of motion. Neurological: He is alert and oriented to person, place, and time. Skin: Skin is warm and dry. Psychiatric: He has a normal mood and affect.        DIAGNOSTIC RESULTS     EKG: All EKG's are interpreted by the Emergency Department Physician who either signs or Co-signs this chart in the absence of a cardiologist.        RADIOLOGY:   Non-plain film images such as CT, Ultrasound and MRI are read by the radiologist. Plain radiographic images are visualized andpreliminarily interpreted by the emergency physician with the below findings:        Interpretationper the Radiologist below, if available at the time of this note:    No orders to display         ED BEDSIDE ULTRASOUND: Performed by ED Physician - none    LABS:  Labs Reviewed - No data to display    All other labs were within normal range or not returned as of this dictation. EMERGENCY DEPARTMENT COURSE and DIFFERENTIAL DIAGNOSIS/MDM:   Vitals:    Vitals:    05/22/19 1219   Pulse: 75   Temp: 97.6 °F (36.4 °C)   TempSrc: Oral   SpO2: 98%           CRITICAL CARE TIME   Total Critical Care time was  minutes, excluding separatelyreportable procedures. There was a high probability ofclinically significant/life threatening deterioration in the patient's condition which required my urgent intervention. CONSULTS:  None    PROCEDURES:  None    PROGRESS NOTES:    Will start on tobramycin eye drops. Follow up with PCP for ophtha consult if no better. FINAL IMPRESSION      1. Abrasion of right cornea, initial encounter          Final diagnoses:   Abrasion of right cornea, initial encounter       DISPOSITION/PLAN   DISPOSITION        PATIENT REFERRED TO:  No follow-up provider specified.     DISCHARGE MEDICATIONS:  New Prescriptions    No medications on file         (Please note thatportions of this note may have been completed with a voice recognition program.  Efforts were [facility] edit the dictations but occasionally words are mis-transcribed.)    Olimpia Tyson MD (electronically signed)  Attending Emergency Physician        Vicky Tabor MD  05/22/19 0073

## 2020-04-09 ENCOUNTER — HOSPITAL ENCOUNTER (EMERGENCY)
Facility: HOSPITAL | Age: 55
Discharge: HOME | End: 2020-04-09
Attending: FAMILY MEDICINE
Payer: MEDICARE

## 2020-04-09 VITALS
WEIGHT: 164 LBS | RESPIRATION RATE: 20 BRPM | HEIGHT: 71 IN | HEART RATE: 60 BPM | DIASTOLIC BLOOD PRESSURE: 72 MMHG | SYSTOLIC BLOOD PRESSURE: 129 MMHG | BODY MASS INDEX: 22.96 KG/M2 | TEMPERATURE: 98.1 F | OXYGEN SATURATION: 97 %

## 2020-04-09 PROCEDURE — 99282 EMERGENCY DEPT VISIT SF MDM: CPT

## 2020-04-09 RX ORDER — CEPHALEXIN 500 MG/1
500 CAPSULE ORAL 2 TIMES DAILY
Qty: 20 CAPSULE | Refills: 0 | Status: SHIPPED | OUTPATIENT
Start: 2020-04-09 | End: 2020-07-05

## 2020-04-09 ASSESSMENT — ENCOUNTER SYMPTOMS
NAUSEA: 0
SINUS PRESSURE: 0
COUGH: 0
TROUBLE SWALLOWING: 0
SORE THROAT: 1
VOMITING: 0
SHORTNESS OF BREATH: 0

## 2020-04-09 ASSESSMENT — PAIN DESCRIPTION - PAIN TYPE: TYPE: ACUTE PAIN

## 2020-04-09 ASSESSMENT — PAIN SCALES - GENERAL: PAINLEVEL_OUTOF10: 9

## 2020-04-09 ASSESSMENT — PAIN DESCRIPTION - DESCRIPTORS: DESCRIPTORS: SORE

## 2020-04-09 ASSESSMENT — PAIN DESCRIPTION - LOCATION: LOCATION: THROAT

## 2020-04-09 NOTE — ED TRIAGE NOTES
Pt arrived to ED. Reports complaints of sore throat x 4-5 days. Pt also reports that he has SOA that is chronic, denies any change in SOA. States \"oh its always like this\". Pt had JESUS when ambulating to room, pt again reports that is normal for him.

## 2020-04-09 NOTE — ED PROVIDER NOTES
[address] [place] [address] Court  eMERGENCY dEPARTMENT eNCOUnter      Pt Name: Quyen Mei  MRN: [id number]  [place] 1965  Date of evaluation: 4/9/2020  Provider: Salina Santa MD    [address] [place] [address]       Chief Complaint   Patient presents with    Pharyngitis         HISTORY OF PRESENT ILLNESS   (Location/Symptom, Timing/Onset, Context/Setting, Quality, Duration, Modifying Factors, Severity)  Note limiting factors. Quyen Mei is a 60 y.o. male who presents to the emergency department with a three-day history of sore throat. Patient denies fever or cough. He continues to smoke half a pack a day. Nursing Notes were reviewed. REVIEW OF SYSTEMS    (2-9 systems for level 4, 10 or more forlevel 5)     Review of Systems   Constitutional: Negative for fever. HENT: Positive for sore throat. Negative for congestion, sinus pressure and trouble swallowing. Respiratory: Negative for cough and shortness of breath. Gastrointestinal: Negative for nausea and vomiting. Except as noted above the remainder of the review of systems was reviewed and negative. PAST MEDICAL HISTORY     Past Medical History:   Diagnosis Date    CAD (coronary artery disease)     Cancer ([facility]     Charcot-Lakia-Tooth disease     Colon cancer ([facility]     COPD (chronic obstructive pulmonary disease) ([facility]     MI (myocardial infarction) ([facility]     x2    Muscular dystrophies ([facility]          SURGICAL HISTORY       Past Surgical History:   Procedure Laterality Date    ANKLE SURGERY      bilateral    ARM SURGERY Bilateral     Crush injury    BRAIN SURGERY      skull injury, metal palte in head    COLONOSCOPY      patient states colon cancer    FRACTURE SURGERY      JOINT REPLACEMENT      left wrist    TONSILLECTOMY           CURRENT MEDICATIONS       Previous Medications    No medications on file       ALLERGIES     Patient has no known allergies.     FAMILY HISTORY       Family History

## 2020-07-05 ENCOUNTER — HOSPITAL ENCOUNTER (EMERGENCY)
Facility: HOSPITAL | Age: 55
Discharge: HOME | End: 2020-07-05
Attending: FAMILY MEDICINE
Payer: MEDICARE

## 2020-07-05 ENCOUNTER — APPOINTMENT (OUTPATIENT)
Dept: GENERAL RADIOLOGY | Facility: HOSPITAL | Age: 55
End: 2020-07-05
Payer: MEDICARE

## 2020-07-05 VITALS
TEMPERATURE: 97.7 F | BODY MASS INDEX: 21 KG/M2 | WEIGHT: 150 LBS | HEART RATE: 90 BPM | DIASTOLIC BLOOD PRESSURE: 93 MMHG | RESPIRATION RATE: 16 BRPM | OXYGEN SATURATION: 97 % | HEIGHT: 71 IN | SYSTOLIC BLOOD PRESSURE: 139 MMHG

## 2020-07-05 PROCEDURE — 73562 X-RAY EXAM OF KNEE 3: CPT

## 2020-07-05 PROCEDURE — 99283 EMERGENCY DEPT VISIT LOW MDM: CPT

## 2020-07-05 RX ORDER — HYDROCODONE BITARTRATE AND ACETAMINOPHEN 10; 325 MG/1; MG/1
1 TABLET ORAL EVERY 6 HOURS PRN
Qty: 12 TABLET | Refills: 0 | Status: SHIPPED | OUTPATIENT
Start: 2020-07-05 | End: 2020-08-04

## 2020-07-05 ASSESSMENT — PAIN DESCRIPTION - ONSET: ONSET: GRADUAL

## 2020-07-05 ASSESSMENT — PAIN SCALES - GENERAL: PAINLEVEL_OUTOF10: 8

## 2020-07-05 ASSESSMENT — PAIN DESCRIPTION - LOCATION: LOCATION: KNEE

## 2020-07-05 ASSESSMENT — PAIN DESCRIPTION - DESCRIPTORS: DESCRIPTORS: ACHING

## 2020-07-05 ASSESSMENT — PAIN DESCRIPTION - ORIENTATION: ORIENTATION: LEFT

## 2020-07-05 ASSESSMENT — PAIN DESCRIPTION - PROGRESSION: CLINICAL_PROGRESSION: GRADUALLY WORSENING

## 2020-07-05 ASSESSMENT — PAIN DESCRIPTION - PAIN TYPE: TYPE: ACUTE PAIN

## 2020-07-05 ASSESSMENT — PAIN DESCRIPTION - FREQUENCY: FREQUENCY: CONTINUOUS

## 2020-07-05 NOTE — ED TRIAGE NOTES
Pt arrival to ER with complaints of left knee pain after injury while moving a refrigerator a few weeks ago.

## 2020-07-05 NOTE — ED PROVIDER NOTES
[address] [place] [address] Court  eMERGENCY dEPARTMENT eNCOUnter      Pt Name: Rosio Red  MRN: [id number]  [place] 1965  Date of evaluation: 7/5/2020  Provider: Berta Knowles MD    [address] [place] [address]       Chief Complaint   Patient presents with    Knee Pain         HISTORY OF PRESENT ILLNESS   (Location/Symptom, Timing/Onset, Context/Setting, Quality, Duration, Modifying Factors, Severity)  Note limiting factors. Rosio Red is a 60 y.o. male who presents to the emergency department complaining about pain and swelling in his left knee since he twisted it 2 weeks ago allegedly while moving a refrigerator. Patient has a history of Charcot-Lakia-Tooth syndrome. Nursing Notes were reviewed. REVIEW OF SYSTEMS    (2-9 systems for level 4, 10 or more forlevel 5)     Review of Systems   Musculoskeletal: Positive for arthralgias, gait problem and joint swelling. Neurological: Positive for weakness and numbness. Except as noted above the remainder of the review of systems was reviewed and negative. PAST MEDICAL HISTORY     Past Medical History:   Diagnosis Date    CAD (coronary artery disease)     [facility] ([facility]     Charcot-Lakia-Tooth disease     Colon cancer ([facility]     COPD (chronic obstructive pulmonary disease) ([facility]     MI (myocardial infarction) ([facility]     x2    Muscular dystrophies ([facility]          SURGICAL HISTORY       Past Surgical History:   Procedure Laterality Date    ANKLE SURGERY      bilateral    ARM SURGERY Bilateral     Crush injury    BRAIN SURGERY      skull injury, metal palte in head    COLONOSCOPY      patient states colon cancer    FRACTURE SURGERY      JOINT REPLACEMENT      left wrist    TONSILLECTOMY           CURRENT MEDICATIONS       Previous Medications    No medications on file       ALLERGIES     Patient has no known allergies.     FAMILY HISTORY       Family History   Problem Relation Age of Onset    Other Father         sharmaine preston tooth    Heart Attack Father     Cancer Father         unknown    Heart Attack Brother           SOCIAL HISTORY       Social History     Socioeconomic History    Marital status:      Spouse name: None    Number of children: None    Years of education: None    Highest education level: None   Occupational History    None   Social Needs    Financial resource strain: None    Food insecurity     Worry: None     Inability: None    Transportation needs     Medical: None     Non-medical: None   Tobacco Use    Smoking status: Current Every Day Smoker     Packs/day: 0.50     Years: 40.00     Pack years: 20.00     Types: Cigarettes    Smokeless tobacco: Never Used   Substance and Sexual Activity    Alcohol use: No    Drug use: No    Sexual activity: Yes     Partners: Female   Lifestyle    Physical activity     Days per week: None     Minutes per session: None    Stress: None   Relationships    Social connections     Talks on phone: None     Gets together: None     Attends Yarsani service: None     Active member of club or organization: None     Attends meetings of clubs or organizations: None     Relationship status: None    Intimate partner violence     Fear of current or ex partner: None     Emotionally abused: None     Physically abused: None     Forced sexual activity: None   Other Topics Concern    None   Social History Narrative    None       SCREENINGS             PHYSICAL EXAM    (up to 7 for level 4, 8 or more for level 5)     ED Triage Vitals   BP Temp Temp Source Pulse Resp SpO2 Height Weight   07/05/20 1103 07/05/20 1100 07/05/20 1100 07/05/20 1100 07/05/20 1100 07/05/20 1100 07/05/20 1100 07/05/20 1100   (!) 139/93 97.7 °F (36.5 °C) Temporal 90 16 97 % 5' 11\" (1.803 m) 150 lb (68 kg)       Physical Exam  Vitals signs and nursing note reviewed. Musculoskeletal:      Comments: There is a mild effusion to the left knee.   There is exaggerated tenderness to the slightest palpation. Patient is able to perform straight leg raise with difficulty. There is evidence of muscle wasting both forearms and legs. Neurological:      Mental Status: He is alert. Mental status is at baseline. DIAGNOSTIC RESULTS     EKG: All EKG's are interpreted by the Emergency Department Physician who either signs or Co-signs this chart in the absence of a cardiologist.        RADIOLOGY:   Non-plain film images such as CT, Ultrasound and MRI are read by the radiologist. Plainradiographic images are visualized and preliminarily interpreted by the emergency physician with the below findings:        Interpretation per the Radiologist below, if available at the time of this note:    XR KNEE LEFT (3 VIEWS)    (Results Pending)         ED BEDSIDE ULTRASOUND:   Performed by ED Physician - none    LABS:  Labs Reviewed - No data to display    All other labs were within normal range or not returned as of this dictation. EMERGENCY DEPARTMENT COURSE and DIFFERENTIALDIAGNOSIS/MDM:   Vitals:    Vitals:    07/05/20 1100 07/05/20 1103   BP:  (!) 139/93   Pulse: 90    Resp: 16    Temp: 97.7 °F (36.5 °C)    TempSrc: Temporal    SpO2: 97%    Weight: 150 lb (68 kg)    Height: 5' 11\" (1.803 m)            CRITICALCARE TIME   Total Critical Care time was 0 minutes, excludingseparately reportable procedures. There was a high probabilityof clinically significant/life threatening deterioration in the patient's condition which required my urgent intervention. CONSULTS:  None    PROCEDURES:  None    FINAL IMPRESSION      1.  Sprain of left knee, unspecified ligament, initial encounter        DISPOSITION/PLAN   DISPOSITION Decision To Discharge 07/05/2020 11:24:47 AM      PATIENT REFERRED TO:  Zan Adkins DO  [id number] [address]  [phone]    Schedule an appointment as soon as possible for a visit       Leia Gutierrez MD  [place] [address]  [phone]    Schedule an

## 2020-07-05 NOTE — ED NOTES
Left knee immobilizer applied at this time. Pt tolerated well. Will prepare for d/c home. Pt verbalized understanding of use of immobilizer.      Nayana Velarde RN  [phone]

## 2020-07-08 ENCOUNTER — OFFICE VISIT (OUTPATIENT)
Dept: ORTHOPEDIC SURGERY | Facility: CLINIC | Age: 55
End: 2020-07-08

## 2020-07-08 VITALS — BODY MASS INDEX: 24.36 KG/M2 | HEIGHT: 71 IN | RESPIRATION RATE: 18 BRPM | WEIGHT: 174 LBS

## 2020-07-08 DIAGNOSIS — M25.362 KNEE INSTABILITY, LEFT: ICD-10-CM

## 2020-07-08 DIAGNOSIS — S86.912A KNEE STRAIN, LEFT, INITIAL ENCOUNTER: ICD-10-CM

## 2020-07-08 DIAGNOSIS — M25.562 ARTHRALGIA OF LEFT KNEE: Primary | ICD-10-CM

## 2020-07-08 PROCEDURE — 99203 OFFICE O/P NEW LOW 30 MIN: CPT | Performed by: PHYSICIAN ASSISTANT

## 2020-07-08 RX ORDER — CELECOXIB 200 MG/1
200 CAPSULE ORAL DAILY
Qty: 10 CAPSULE | Refills: 0 | Status: SHIPPED | OUTPATIENT
Start: 2020-07-08 | End: 2021-05-10

## 2020-07-08 RX ORDER — HYDROCODONE BITARTRATE AND ACETAMINOPHEN 10; 325 MG/1; MG/1
1 TABLET ORAL EVERY 8 HOURS PRN
COMMUNITY
End: 2021-05-10

## 2020-07-08 NOTE — PROGRESS NOTES
"Subjective   Patient ID: Gene Lawton is a 55 y.o. right hand dominant male  Pain of the Left Knee (Patient is here today for left knee pain, he states he was moving a refrigerator about 2 weeks ago when he twisted his knee. He states this past Sunday he went to the ER @ [facility]  )         History of Present Illness      Patient presents as a new patient with complaints of left knee pain that began 2 weeks prior.  He states he was trying to move a refrigerator, and during the process he twisted his left knee.  He states he felt intense pain and could not bear weight temporarily.  He has used his motorized wheelchair which he uses often due to his history of Charcot Leigh joint ( bilateral feet).  He now notes that since this previous injury his left knee feels \"unstable\".  He states when in the shower it feels like his left knee wants to \"go out of socket\"  There has been no redness or warmth to the left knee    Past Medical History:   Diagnosis Date   • Arthritis    • Charcot Leigh Tooth muscular atrophy    • Colon polyps     REPORTS HISTORY   • COPD (chronic obstructive pulmonary disease) (CMS/HCC)    • Depression    • Difficulty reading    • Difficulty writing    • High cholesterol    • History of drug use     PATIENT REPORTS NO USE FOR OVER 15 YEARS   • Hypertension    • Mobility impaired     REPORTS WALKS VERY LITTLE.  REPORTS CAN'T FEEL HANDS AND FEET DUR TO CHARCOT LEIGH TOOTH.  USES A WHEELCHAIR AND CAN TRANSFER FROM CHAIR TO CHAIR.     • Muscular dystrophies (CMS/HCC)    • Stroke (CMS/HCC)     PATIENT REPORTS QUESTIONABLE EPISODE 6-7 YEARS AGO AND THAT HE WAS FLOWN FROM [place] TO [place].   • Wears glasses    • Wears partial dentures     UPPER PLATE        Past Surgical History:   Procedure Laterality Date   • COLONOSCOPY N/A 9/12/2017    Procedure: COLONOSCOPY WITH POLYPECTOMY;  Surgeon: Geo Shannon MD;  Location: [facility] ENDOSCOPY;  Service:    • COLONOSCOPY W/ POLYPECTOMY     • ENDOSCOPY     • " FRACTURE SURGERY Right     REPORTS HARDWARE IN PLACE   • FRACTURE SURGERY Bilateral     ANKLES, REPORTS HARDWARE IN PLACE IN BILATERAL ANKLES   • HERNIA REPAIR     • MOUTH SURGERY      EXTRACTIONS   • TONSILLECTOMY         Family History   Problem Relation Age of Onset   • Heart disease Father    • Cancer Father        Social History     Socioeconomic History   • Marital status:      Spouse name: Not on file   • Number of children: Not on file   • Years of education: Not on file   • Highest education level: Not on file   Tobacco Use   • Smoking status: Current Some Day Smoker     Packs/day: 0.25     Years: 45.00     Pack years: 11.25     Types: Cigarettes   • Smokeless tobacco: Never Used   Substance and Sexual Activity   • Alcohol use: No     Comment: REPORTS NO USE IN OVER 15 YEARS   • Drug use: No   • Sexual activity: Defer         Current Outpatient Medications:   •  HYDROcodone-acetaminophen (NORCO)  MG per tablet, Take 1 tablet by mouth Every 8 (Eight) Hours As Needed for Moderate Pain ., Disp: , Rfl:   •  celecoxib (CeleBREX) 200 MG capsule, Take 1 capsule by mouth Daily., Disp: 10 capsule, Rfl: 0    No Known Allergies    Review of Systems   Constitutional: Negative for fever.   HENT: Negative for dental problem and voice change.    Eyes: Negative for visual disturbance.   Respiratory: Negative for shortness of breath.    Cardiovascular: Negative for chest pain.   Gastrointestinal: Negative for abdominal pain.   Genitourinary: Negative for dysuria.   Musculoskeletal: Positive for arthralgias. Negative for gait problem and joint swelling.   Skin: Negative for rash.   Neurological: Negative for speech difficulty.   Hematological: Does not bruise/bleed easily.   Psychiatric/Behavioral: Negative for confusion.       I have reviewed the medical and surgical history, family history, social history, medications, and/or allergies, and the review of systems of this report.    Objective   Resp 18   Ht 180  "cm (70.87\")   Wt 78.9 kg (174 lb)   BMI 24.36 kg/m²    Physical Exam   Constitutional: He is oriented to person, place, and time. He appears well-developed.   Pulmonary/Chest: Effort normal. No respiratory distress.   Musculoskeletal:        Left hip: He exhibits no tenderness and no bony tenderness.        Left knee: He exhibits decreased range of motion and swelling. He exhibits no ecchymosis, no deformity and no erythema. Tenderness found. Medial joint line and MCL tenderness noted.        Left ankle: No tenderness. No lateral malleolus and no medial malleolus tenderness found.   Neurological: He is alert and oriented to person, place, and time.   Psychiatric: He has a normal mood and affect.   Nursing note and vitals reviewed.    Left Knee Exam     Range of Motion   Extension: 10   Flexion: 90     Tests   Tim:  Medial - positive   Drawer:  Anterior - trace         Other   Erythema: absent  Sensation: normal  Pulse: present           Extremity DVT signs are negative on physical exam with negative Leelee sign, no calf pain, no palpable cords and no skin tone change   Neurologic Exam     Mental Status   Oriented to person, place, and time.        Patient states he always has difficulty extending his knees due to his Charcot-Leigh joints of his feet    Assessment/Plan   Independent Review of Radiographic Studies:    X-ray of the left knee standing 2 views performed in the office for the evaluation of knee pain.  No comparison films available to review.  No acute fracture.  There does appear to be tricompartmental degenerative changes with lateral joint space narrowing    Procedures       Gene was seen today for pain.    Diagnoses and all orders for this visit:    Arthralgia of left knee  -     XR Knee 1 or 2 View Left  -     MRI Knee Left Without Contrast  -     celecoxib (CeleBREX) 200 MG capsule; Take 1 capsule by mouth Daily.    Knee instability, left  -     MRI Knee Left Without Contrast  -     celecoxib " (CeleBREX) 200 MG capsule; Take 1 capsule by mouth Daily.    Knee strain, left, initial encounter  -     MRI Knee Left Without Contrast  -     celecoxib (CeleBREX) 200 MG capsule; Take 1 capsule by mouth Daily.       Regular exercise as tolerated  Orthopedic activities reviewed and patient expressed appreciation  Discussion of orthopedic goals  Risk, benefits, and merits of treatment alternatives reviewed with the patient and questions answered    Recommendations/Plan:  Exercise, medications, injections, other patient advice, and return appointment as noted.  Patient is encouraged to call or return for any issues or concerns.   Patient was given a hinged knee brace in the office.  Instructed patient to elevate and ice the knee.  May take over-the-counter Tylenol as needed    Patient agreeable to call or return sooner for any concerns.               EMR Dragon-transcription disclaimer:  This encounter note is an electronic transcription of spoken language to printed text.  Electronic transcription of spoken language may permit erroneous or at times nonsensical words or phrases to be inadvertently transcribed.  Although I have reviewed the note for such errors, some may still exist

## 2020-11-23 ENCOUNTER — HOSPITAL ENCOUNTER (OUTPATIENT)
Facility: HOSPITAL | Age: 55
Discharge: HOME | End: 2020-11-23
Payer: MEDICARE

## 2020-11-23 ENCOUNTER — OFFICE VISIT (OUTPATIENT)
Dept: PRIMARY CARE CLINIC | Age: 55
End: 2020-11-23
Payer: MEDICARE

## 2020-11-23 VITALS
HEART RATE: 84 BPM | RESPIRATION RATE: 18 BRPM | OXYGEN SATURATION: 98 % | SYSTOLIC BLOOD PRESSURE: 110 MMHG | DIASTOLIC BLOOD PRESSURE: 66 MMHG | TEMPERATURE: 97.1 F

## 2020-11-23 LAB
A/G RATIO: 1.6 (ref 0.8–2)
ALBUMIN SERPL-MCNC: 4.6 G/DL (ref 3.4–4.8)
ALP BLD-CCNC: 68 U/L (ref 25–100)
ALT SERPL-CCNC: 39 U/L (ref 4–36)
ANION GAP SERPL CALCULATED.3IONS-SCNC: 9 MMOL/L (ref 3–16)
AST SERPL-CCNC: 37 U/L (ref 8–33)
BASOPHILS ABSOLUTE: 0.1 K/UL (ref 0–0.1)
BASOPHILS RELATIVE PERCENT: 1.2 %
BILIRUB SERPL-MCNC: 0.4 MG/DL (ref 0.3–1.2)
BUN BLDV-MCNC: 12 MG/DL (ref 6–20)
CALCIUM SERPL-MCNC: 9.6 MG/DL (ref 8.5–10.5)
CHLORIDE BLD-SCNC: 102 MMOL/L (ref 98–107)
CHOLESTEROL, TOTAL: 145 MG/DL (ref 0–200)
CO2: 26 MMOL/L (ref 20–30)
CREAT SERPL-MCNC: 0.7 MG/DL (ref 0.4–1.2)
EOSINOPHILS ABSOLUTE: 0.2 K/UL (ref 0–0.4)
EOSINOPHILS RELATIVE PERCENT: 2.6 %
GFR AFRICAN AMERICAN: >59
GFR NON-AFRICAN AMERICAN: >59
GLOBULIN: 2.8 G/DL
GLUCOSE BLD-MCNC: 109 MG/DL (ref 74–106)
HCT VFR BLD CALC: 47.9 % (ref 40–54)
HDLC SERPL-MCNC: 41 MG/DL (ref 40–60)
HEMOGLOBIN: 15.2 G/DL (ref 13–18)
IMMATURE GRANULOCYTES #: 0 K/UL
IMMATURE GRANULOCYTES %: 0.2 % (ref 0–5)
LDL CHOLESTEROL CALCULATED: 91 MG/DL
LYMPHOCYTES ABSOLUTE: 2.2 K/UL (ref 1.5–4)
LYMPHOCYTES RELATIVE PERCENT: 36.9 %
MCH RBC QN AUTO: 31.1 PG (ref 27–32)
MCHC RBC AUTO-ENTMCNC: 31.7 G/DL (ref 31–35)
MCV RBC AUTO: 98.2 FL (ref 80–100)
MONOCYTES ABSOLUTE: 0.5 K/UL (ref 0.2–0.8)
MONOCYTES RELATIVE PERCENT: 8.9 %
NEUTROPHILS ABSOLUTE: 3.1 K/UL (ref 2–7.5)
NEUTROPHILS RELATIVE PERCENT: 50.2 %
PDW BLD-RTO: 13.2 % (ref 11–16)
PLATELET # BLD: 328 K/UL (ref 150–400)
PMV BLD AUTO: 10.9 FL (ref 6–10)
POTASSIUM SERPL-SCNC: 4.6 MMOL/L (ref 3.4–5.1)
PROSTATE SPECIFIC ANTIGEN: 0.37 NG/ML (ref 0–4)
RBC # BLD: 4.88 M/UL (ref 4.5–6)
SODIUM BLD-SCNC: 137 MMOL/L (ref 136–145)
TOTAL PROTEIN: 7.4 G/DL (ref 6.4–8.3)
TRIGL SERPL-MCNC: 64 MG/DL (ref 0–249)
VLDLC SERPL CALC-MCNC: 13 MG/DL
WBC # BLD: 6.1 K/UL (ref 4–11)

## 2020-11-23 PROCEDURE — G0103 PSA SCREENING: HCPCS

## 2020-11-23 PROCEDURE — 80061 LIPID PANEL: CPT

## 2020-11-23 PROCEDURE — 80053 COMPREHEN METABOLIC PANEL: CPT

## 2020-11-23 PROCEDURE — 85025 COMPLETE CBC W/AUTO DIFF WBC: CPT

## 2020-11-23 PROCEDURE — G0009 ADMIN PNEUMOCOCCAL VACCINE: HCPCS | Performed by: NURSE PRACTITIONER

## 2020-11-23 PROCEDURE — 84153 ASSAY OF PSA TOTAL: CPT

## 2020-11-23 PROCEDURE — 90732 PPSV23 VACC 2 YRS+ SUBQ/IM: CPT | Performed by: NURSE PRACTITIONER

## 2020-11-23 PROCEDURE — G0008 ADMIN INFLUENZA VIRUS VAC: HCPCS | Performed by: NURSE PRACTITIONER

## 2020-11-23 PROCEDURE — 90688 IIV4 VACCINE SPLT 0.5 ML IM: CPT | Performed by: NURSE PRACTITIONER

## 2020-11-23 PROCEDURE — 99214 OFFICE O/P EST MOD 30 MIN: CPT | Performed by: NURSE PRACTITIONER

## 2020-11-23 RX ORDER — VARENICLINE TARTRATE
KIT
Qty: 1 BOX | Refills: 0 | Status: SHIPPED | OUTPATIENT
Start: 2020-11-23 | End: 2022-04-08

## 2020-11-23 RX ORDER — VARENICLINE TARTRATE 1 MG/1
1 TABLET, FILM COATED ORAL 2 TIMES DAILY
Qty: 60 TABLET | Refills: 1 | Status: SHIPPED | OUTPATIENT
Start: 2020-11-23 | End: 2022-04-08

## 2020-11-23 RX ORDER — GABAPENTIN 300 MG/1
300 CAPSULE ORAL 3 TIMES DAILY
Qty: 90 CAPSULE | Refills: 0 | Status: SHIPPED | OUTPATIENT
Start: 2020-11-23 | End: 2020-12-28 | Stop reason: SDUPTHER

## 2020-11-23 ASSESSMENT — PATIENT HEALTH QUESTIONNAIRE - PHQ9
SUM OF ALL RESPONSES TO PHQ QUESTIONS 1-9: 0
2. FEELING DOWN, DEPRESSED OR HOPELESS: 0
1. LITTLE INTEREST OR PLEASURE IN DOING THINGS: 0
SUM OF ALL RESPONSES TO PHQ QUESTIONS 1-9: 0
SUM OF ALL RESPONSES TO PHQ9 QUESTIONS 1 & 2: 0
SUM OF ALL RESPONSES TO PHQ QUESTIONS 1-9: 0

## 2020-11-23 NOTE — PROGRESS NOTES
SUBJECTIVE:    Patient ID: Rocío Mckinley is a 60 y.o. male. Medical History Review  Past Medical, Family, and Social History reviewed and does contribute to the patient presenting condition    Health Maintenance Due   Topic Date Due    Hepatitis C screen  1965    HIV screen  02/22/1978    Shingles Vaccine (1 of 2) 02/22/2013    Annual Wellness Visit (AWV)  06/23/2019    Colon cancer screen colonoscopy  09/12/2020       HPI:   Chief Complaint   Patient presents with   [facility] Doctor   He stays in quite a bit of pain. He is not seeing any specialist currently. He has one at [facility] that he sees occ. He has foot braces that he has trouble wearing due to nerve pain. He takes a lot of Tylenol. He used to be in a pain clinic in [place]. He quit going there and saw [facility]. He was referred to Dr. Rich Medina but they did not him. He cannot recall how he responded to Gabapentin. He sees Dr. Eugene Lucas (cardiology). He states he had a colonoscopy with Dr. Aide Nagel. He would like a refill on Combivent. He has not been taking any medications. He is not able to walk much due to foot drop. He uses his wheelchair for all ADLs. Patient's medications, allergies, past medical, surgical, social and family histories were reviewed and updated as appropriate. Review of Systems   Constitutional: Negative for chills and fever. HENT: Negative for ear pain and sore throat. Eyes: Negative for pain and visual disturbance. Respiratory: Negative for cough and shortness of breath. Cardiovascular: Negative for chest pain, palpitations and leg swelling. Gastrointestinal: Negative for abdominal pain, nausea and vomiting. Genitourinary: Negative for dysuria and hematuria. Musculoskeletal: Positive for gait problem. Negative for joint swelling. Skin: Negative for rash. Neurological: Negative for dizziness and weakness.         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 Reviewed and acurate. See MA note. OBJECTIVE:  /66   Pulse 84   Temp 97.1 °F (36.2 °C)   Resp 18   SpO2 98%    Physical Exam  Vitals signs reviewed. Constitutional:       General: He is not in acute distress. Appearance: He is well-developed. HENT:      Head: Normocephalic. Right Ear: Tympanic membrane normal.      Left Ear: Tympanic membrane normal.      Mouth/Throat:      Pharynx: No oropharyngeal exudate. Eyes:      General: Lids are normal.   Neck:      Musculoskeletal: Neck supple. Cardiovascular:      Rate and Rhythm: Normal rate and regular rhythm. Heart sounds: Normal heart sounds. Pulmonary:      Effort: Pulmonary effort is normal.      Breath sounds: Normal breath sounds. Abdominal:      General: Bowel sounds are normal. There is no distension. Palpations: Abdomen is soft. Tenderness: There is no abdominal tenderness. Musculoskeletal:      Comments: mucsle atrophy in his lower extremities; in power wheelchair   Lymphadenopathy:      Cervical: No cervical adenopathy. Skin:     General: Skin is warm and dry. Neurological:      Mental Status: He is alert and oriented to person, place, and time.          Results in Past 30 Days  Result Component Current Result Ref Range Previous Result Ref Range   Alb 4.6 (11/23/2020) 3.4 - 4.8 g/dL Not in Time Range    Albumin/Globulin Ratio 1.6 (11/23/2020) 0.8 - 2.0 Not in Time Range    Alkaline Phosphatase 68 (11/23/2020) 25 - 100 U/L Not in Time Range    ALT 39 (H) (11/23/2020) 4 - 36 U/L Not in Time Range    AST 37 (H) (11/23/2020) 8 - 33 U/L Not in Time Range    BUN 12 (11/23/2020) 6 - 20 mg/dL Not in Time Range    Calcium 9.6 (11/23/2020) 8.5 - 10.5 mg/dL Not in Time Range    Chloride 102 (11/23/2020) 98 - 107 mmol/L Not in Time Range    CO2 26 (11/23/2020) 20 - 30 mmol/L Not in Time Range    CREATININE 0.7 (11/23/2020) 0.4 - 1.2 mg/dL Not in Time Range    GFR  >59 (11/23/2020) >59 Not in Time Range    GFR Non- >59 (11/23/2020) >59 Not in Time Range    Globulin 2.8 (11/23/2020) g/dL Not in Time Range    Glucose 109 (H) (11/23/2020) 74 - 106 mg/dL Not in Time Range    Potassium 4.6 (11/23/2020) 3.4 - 5.1 mmol/L Not in Time Range    Sodium 137 (11/23/2020) 136 - 145 mmol/L Not in Time Range    Total Bilirubin 0.4 (11/23/2020) 0.3 - 1.2 mg/dL Not in Time Range    Total Protein 7.4 (11/23/2020) 6.4 - 8.3 g/dL Not in Time Range      Microscopic Examination (no units)   Date Value   02/13/2018 Not Indicated     LDL Calculated (mg/dL)   Date Value   11/23/2020 91       Lab Results   Component Value Date    WBC 6.1 11/23/2020    NEUTROABS 3.1 11/23/2020    HGB 15.2 11/23/2020    HCT 47.9 11/23/2020    MCV 98.2 11/23/2020     11/23/2020     Lab Results   Component Value Date    TSH 1.29 07/23/2015       Prior to Visit Medications    Medication Sig Taking? Authorizing Provider   albuterol-ipratropium (COMBIVENT RESPIMAT)  MCG/ACT AERS inhaler Inhale 1 puff into the lungs every 6 hours Yes Yisel De Luna APRN   varenicline (CHANTIX STARTING MONTH PAK) 0.5 MG X 11 & 1 MG X 42 tablet Take by mouth. Yes Marlou Nap APRN   varenicline (CHANTIX) 1 MG tablet Take 1 tablet by mouth 2 times daily Yes Marlou Nap APRN   gabapentin (NEURONTIN) 300 MG capsule Take 1 capsule by mouth 3 times daily for 30 days. Yes Yisel De Luna APRN       ASSESSMENT:  1. Charcot-Lakia-Tooth atrophy    2. Coronary artery disease involving native coronary artery of native heart, angina presence unspecified    3. Panlobular emphysema ([facility]    4. Colon cancer screening    5. Need for prophylactic vaccination against Streptococcus pneumoniae (pneumococcus)    6.  Encounter for screening for malignant neoplasm of prostate        PLAN:  Orders Placed This Encounter   Medications    albuterol-ipratropium (COMBIVENT RESPIMAT)  MCG/ACT AERS inhaler     Sig: Inhale 1 puff into the lungs every 6 hours     Dispense:  1 Inhaler     Refill:  5    varenicline (CHANTIX STARTING MONTH ARTHUR) 0.5 MG X 11 & 1 MG X 42 tablet     Sig: Take by mouth. Dispense:  1 box     Refill:  0    varenicline (CHANTIX) 1 MG tablet     Sig: Take 1 tablet by mouth 2 times daily     Dispense:  60 tablet     Refill:  1    gabapentin (NEURONTIN) 300 MG capsule     Sig: Take 1 capsule by mouth 3 times daily for 30 days.      Dispense:  90 capsule     Refill:  0     Orders Placed This Encounter   Procedures    Pneumococcal polysaccharide vaccine 23-valent PPSV23    INFLUENZA, QUADV, 3 YRS AND OLDER, IM, MDV, 0.5ML (AFLURIA QUADV)    Psa screening    LIPID PANEL    COMPREHENSIVE METABOLIC PANEL    CBC WITH AUTO DIFFERENTIAL    External Referral To Pain Clinic

## 2020-12-08 ENCOUNTER — TELEPHONE (OUTPATIENT)
Dept: PRIMARY CARE CLINIC | Age: 55
End: 2020-12-08

## 2020-12-20 ASSESSMENT — ENCOUNTER SYMPTOMS
VOMITING: 0
SORE THROAT: 0
ABDOMINAL PAIN: 0
EYE PAIN: 0
SHORTNESS OF BREATH: 0
COUGH: 0
NAUSEA: 0

## 2020-12-28 RX ORDER — GABAPENTIN 300 MG/1
300 CAPSULE ORAL 3 TIMES DAILY
Qty: 90 CAPSULE | Refills: 0 | Status: SHIPPED | OUTPATIENT
Start: 2020-12-28 | End: 2021-01-29

## 2021-01-27 DIAGNOSIS — G60.0 CHARCOT-MARIE-TOOTH ATROPHY: ICD-10-CM

## 2021-01-27 NOTE — TELEPHONE ENCOUNTER
Fany Brennanjas is requesting a refill of their   Requested Prescriptions     Pending Prescriptions Disp Refills    albuterol-ipratropium (COMBIVENT RESPIMAT)  MCG/ACT AERS inhaler 1 Inhaler 5     Sig: Inhale 1 puff into the lungs every 6 hours   . Please advise.       Last Appt:  11/23/2020  Next Appt:   5/25/2021  Preferred pharmacy: [facility] pharmacy

## 2021-01-29 RX ORDER — GABAPENTIN 300 MG/1
CAPSULE ORAL
Qty: 90 CAPSULE | Refills: 0 | Status: SHIPPED | OUTPATIENT
Start: 2021-01-29 | End: 2021-02-25

## 2021-02-25 DIAGNOSIS — G60.0 CHARCOT-MARIE-TOOTH ATROPHY: ICD-10-CM

## 2021-02-25 RX ORDER — GABAPENTIN 300 MG/1
CAPSULE ORAL
Qty: 90 CAPSULE | Refills: 0 | Status: SHIPPED | OUTPATIENT
Start: 2021-02-25 | End: 2021-03-26

## 2021-03-22 DIAGNOSIS — G60.0 CHARCOT-MARIE-TOOTH ATROPHY: ICD-10-CM

## 2021-03-22 RX ORDER — GABAPENTIN 300 MG/1
CAPSULE ORAL
Qty: 90 CAPSULE | Refills: 0 | OUTPATIENT
Start: 2021-03-22

## 2021-03-25 DIAGNOSIS — G60.0 CHARCOT-MARIE-TOOTH ATROPHY: ICD-10-CM

## 2021-03-26 RX ORDER — GABAPENTIN 300 MG/1
CAPSULE ORAL
Qty: 90 CAPSULE | Refills: 0 | Status: SHIPPED | OUTPATIENT
Start: 2021-03-26 | End: 2022-10-02

## 2021-05-10 ENCOUNTER — APPOINTMENT (OUTPATIENT)
Dept: ULTRASOUND IMAGING | Facility: HOSPITAL | Age: 56
End: 2021-05-10

## 2021-05-10 ENCOUNTER — APPOINTMENT (OUTPATIENT)
Dept: GENERAL RADIOLOGY | Facility: HOSPITAL | Age: 56
End: 2021-05-10

## 2021-05-10 ENCOUNTER — HOSPITAL ENCOUNTER (EMERGENCY)
Facility: HOSPITAL | Age: 56
Discharge: HOME | End: 2021-05-10
Attending: EMERGENCY MEDICINE | Admitting: EMERGENCY MEDICINE

## 2021-05-10 VITALS
HEIGHT: 71 IN | SYSTOLIC BLOOD PRESSURE: 142 MMHG | OXYGEN SATURATION: 100 % | RESPIRATION RATE: 18 BRPM | DIASTOLIC BLOOD PRESSURE: 101 MMHG | BODY MASS INDEX: 25.62 KG/M2 | WEIGHT: 183 LBS | TEMPERATURE: 97.3 F | HEART RATE: 73 BPM

## 2021-05-10 DIAGNOSIS — S87.82XA CRUSHING INJURY OF LEFT LOWER EXTREMITY, INITIAL ENCOUNTER: Primary | ICD-10-CM

## 2021-05-10 DIAGNOSIS — M71.22 SYNOVIAL CYST OF LEFT POPLITEAL SPACE: ICD-10-CM

## 2021-05-10 PROCEDURE — 73590 X-RAY EXAM OF LOWER LEG: CPT

## 2021-05-10 PROCEDURE — 93971 EXTREMITY STUDY: CPT

## 2021-05-10 PROCEDURE — 99283 EMERGENCY DEPT VISIT LOW MDM: CPT

## 2021-05-10 PROCEDURE — 73562 X-RAY EXAM OF KNEE 3: CPT

## 2021-05-10 RX ORDER — HYDROCODONE BITARTRATE AND ACETAMINOPHEN 5; 325 MG/1; MG/1
1 TABLET ORAL EVERY 6 HOURS PRN
Qty: 10 TABLET | Refills: 0 | Status: SHIPPED | OUTPATIENT
Start: 2021-05-10 | End: 2022-08-26

## 2021-09-15 ENCOUNTER — APPOINTMENT (OUTPATIENT)
Dept: GENERAL RADIOLOGY | Facility: HOSPITAL | Age: 56
End: 2021-09-15
Payer: MEDICARE

## 2021-09-15 ENCOUNTER — HOSPITAL ENCOUNTER (EMERGENCY)
Facility: HOSPITAL | Age: 56
Discharge: HOME | End: 2021-09-15
Attending: EMERGENCY MEDICINE
Payer: MEDICARE

## 2021-09-15 VITALS
RESPIRATION RATE: 18 BRPM | SYSTOLIC BLOOD PRESSURE: 136 MMHG | WEIGHT: 185 LBS | DIASTOLIC BLOOD PRESSURE: 91 MMHG | HEIGHT: 71 IN | OXYGEN SATURATION: 98 % | TEMPERATURE: 98.6 F | HEART RATE: 93 BPM | BODY MASS INDEX: 25.9 KG/M2

## 2021-09-15 DIAGNOSIS — S80.02XA CONTUSION OF KNEE AND LOWER LEG, LEFT, INITIAL ENCOUNTER: ICD-10-CM

## 2021-09-15 DIAGNOSIS — M47.816 OSTEOARTHRITIS OF LUMBAR SPINE, UNSPECIFIED SPINAL OSTEOARTHRITIS COMPLICATION STATUS: ICD-10-CM

## 2021-09-15 DIAGNOSIS — R03.0 ELEVATED BP WITHOUT DIAGNOSIS OF HYPERTENSION: ICD-10-CM

## 2021-09-15 DIAGNOSIS — S80.01XA CONTUSION OF KNEE AND LOWER LEG, RIGHT, INITIAL ENCOUNTER: ICD-10-CM

## 2021-09-15 DIAGNOSIS — S93.601A FOOT SPRAIN, RIGHT, INITIAL ENCOUNTER: ICD-10-CM

## 2021-09-15 DIAGNOSIS — S93.602A FOOT SPRAIN, LEFT, INITIAL ENCOUNTER: ICD-10-CM

## 2021-09-15 DIAGNOSIS — S80.12XA CONTUSION OF KNEE AND LOWER LEG, LEFT, INITIAL ENCOUNTER: ICD-10-CM

## 2021-09-15 DIAGNOSIS — S80.11XA CONTUSION OF KNEE AND LOWER LEG, RIGHT, INITIAL ENCOUNTER: ICD-10-CM

## 2021-09-15 DIAGNOSIS — S39.012A STRAIN OF LUMBAR REGION, INITIAL ENCOUNTER: Primary | ICD-10-CM

## 2021-09-15 PROCEDURE — 99282 EMERGENCY DEPT VISIT SF MDM: CPT

## 2021-09-15 PROCEDURE — 73630 X-RAY EXAM OF FOOT: CPT

## 2021-09-15 PROCEDURE — 73590 X-RAY EXAM OF LOWER LEG: CPT

## 2021-09-15 PROCEDURE — 73620 X-RAY EXAM OF FOOT: CPT

## 2021-09-15 PROCEDURE — 72100 X-RAY EXAM L-S SPINE 2/3 VWS: CPT

## 2021-09-15 RX ORDER — IBUPROFEN 600 MG/1
600 TABLET ORAL 3 TIMES DAILY PRN
Qty: 30 TABLET | Refills: 0 | Status: SHIPPED | OUTPATIENT
Start: 2021-09-15 | End: 2022-04-08

## 2021-09-15 ASSESSMENT — PAIN DESCRIPTION - LOCATION: LOCATION: LEG;FOOT

## 2021-09-15 ASSESSMENT — PAIN SCALES - GENERAL: PAINLEVEL_OUTOF10: 9

## 2021-09-15 ASSESSMENT — PAIN DESCRIPTION - ORIENTATION: ORIENTATION: RIGHT;LEFT

## 2021-09-15 NOTE — ED NOTES
Pt presents to ER with c/o redness and pain to BLE and feet after falling around 05:30am- states tripped- c/o back pain since falling at 05:30am- pt states the redness just started today- pt rates pain 9/10 in legs and feet- redness and swelling noted to [address] [place] [address] Blvd, RN  09/15/21 [id number]

## 2021-09-15 NOTE — ED PROVIDER NOTES
[address] ENCOUNTER      Pt Name: Bernard Latif  MRN: [id number]  YOB: 1965  Date of evaluation: 9/15/2021  Provider: Pavithra Pan MD    [address] [place] [address]       Chief Complaint   Patient presents with    Leg Pain     c/o BLE pain after falling at 05:30 am- c/o pain and redness to legs and feet    Back Pain     c/o back pain after fall at 05:30 am- ground level fall     Knee Pain     c/o bilateral knee pain since fall         HISTORY OF PRESENT ILLNESS  (Location/Symptom, Timing/Onset, Context/Setting, Quality, Duration, Modifying Factors, Severity.)   Bernard Latif is a 60 y.o. male who presents to the emergency department with pain in both legs. 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 This morning as he was reaching for a light switch he had tripped and fell on the rug. He complains of pain in both legs. Still complains of worsening back pain. He has chronic back pain. No new onset weakness or numbness. Denies any pain or injury to the head cervical or thoracic spine chest abdomen or pelvis or upper extremities. Nursing notes were reviewed. REVIEW OFSYSTEMS    (2-9 systems for level 4, 10 or more for level 5)   ROS:  General:  No fevers, no chills, no weakness  Cardiovascular:  No chest pain, no palpitations  Respiratory:  No shortness of breath, no cough, no wheezing  Gastrointestinal:  No pain, no nausea, no vomiting, no diarrhea  Musculoskeletal:  No muscle pain, no joint pain  Skin:  No rash, no easy bruising  Neurologic:  No speech problems, no headache, no extremity weakness  Psychiatric:  No anxiety  Genitourinary:  No dysuria, no hematuria    Except as noted above the remainder of the review of systems was reviewed and negative.        PAST MEDICAL HISTORY     Past Medical History:   Diagnosis Date    CAD (coronary artery disease)     [facility]     Charcot-Lakia-Tooth disease     [facility] cancer ([facility]     COPD (chronic obstructive pulmonary disease) (HCC)     MI ([facility] infarction) ([facility]     x2    Muscular dystrophies ([facility]          SURGICAL HISTORY       Past Surgical History:   Procedure Laterality Date    ANKLE SURGERY      bilateral    ARM SURGERY Bilateral     Crush injury    BRAIN SURGERY      skull injury, metal palte in head    COLONOSCOPY      patient states colon cancer    FRACTURE SURGERY      JOINT REPLACEMENT      left wrist    TONSILLECTOMY           CURRENT MEDICATIONS       Previous Medications    ALBUTEROL-IPRATROPIUM (COMBIVENT RESPIMAT)  MCG/ACT AERS INHALER    Inhale 1 puff into the lungs every 6 hours    GABAPENTIN (NEURONTIN) 300 MG CAPSULE    TAKE ONE CAPSULE BY MOUTH THREE TIMES A DAY    VARENICLINE (CHANTIX STARTING MONTH PAK) 0.5 MG X 11 & 1 MG X 42 TABLET    Take by mouth. VARENICLINE (CHANTIX) 1 MG TABLET    Take 1 tablet by mouth 2 times daily       ALLERGIES     Patient has no known allergies.     FAMILY HISTORY       Family History   Problem Relation Age of Onset    Other Father         charcot preston tooth    Heart Attack Father     Cancer Father         unknown    Heart Attack Brother           SOCIAL HISTORY       Social History     Socioeconomic History    Marital status:      Spouse name: Not on file    Number of children: Not on file    Years of education: Not on file    Highest education level: Not on file   Occupational History    Not on file   Tobacco Use    Smoking status: Current Every Day Smoker     Packs/day: 0.50     Years: 40.00     Pack years: 20.00     Types: Cigarettes    Smokeless tobacco: Never Used   Vaping Use    Vaping Use: Never used   Substance and Sexual Activity    Alcohol use: No    Drug use: No    Sexual activity: Yes     Partners: Female   Other Topics Concern    Not on file   Social History Narrative    Not on file     Social Determinants of Health     Financial Resource Strain:     Difficulty of Paying Living Expenses:    Food Insecurity:     Worried About Running Out of Food in the Last Year:     [address] in the Last Year:    Transportation Needs:     Lack of Transportation (Medical):  Lack of Transportation (Non-Medical):    Physical Activity:     Days of Exercise per Week:     Minutes of Exercise per Session:    Stress:     Feeling of Stress :    Social Connections:     Frequency of Communication with Friends and Family:     Frequency of Social Gatherings with Friends and Family:     Attends [employer] Services:     Active Member of Clubs or Organizations:     Attends Club or Organization Meetings:     Marital Status:    Intimate Partner Violence:     Fear of Current or Ex-Partner:     Emotionally Abused:     Physically Abused:     Sexually Abused:          PHYSICAL EXAM    (up to 7 for level 4, 8 or more for level 5)     ED Triage Vitals [09/15/21 1143]   BP Temp Temp Source Pulse Resp SpO2 Height Weight   (!) 136/91 98.6 °F (37 °C) Oral 93 18 98 % 5' 11\" (1.803 m) 185 lb (83.9 kg)       Physical Exam  General :Patient is awake, alert, oriented, in no acute distress, nontoxic appearing  HEENT: Pupils are equally round and reactive to light, EOMI, conjunctivae clear. Oral mucosa is moist, no exudate. Uvula is midline  Neck: Neck is supple, full range of motion, trachea midline  Cardiac: Heart regular rate, rhythm, no murmurs, rubs, or gallops  Lungs: Lungs are clear to auscultation, there is no wheezing, rhonchi, or rales. There is no use of accessory muscles. Chest wall: There is no tenderness to palpation over the chest wall or over ribs  Abdomen: Abdomen is soft, nontender, nondistended. There is no firm or pulsatile masses, no rebound rigidity or guarding. Musculoskeletal: He has diffuse weakness especially in the lower extremities. No unilateral sensory loss. There is tenderness in the mid shin of both legs. There is swelling of both ankles and feet. States this is chronic.   Full range of motion at the knee and ankle. Neuro: Motor intact, sensory intact, level of consciousness is normal, cerebellar function is normal, reflexes are grossly normal. No evidence of incontinence or loss of bowel or bladder function, no saddle anesthesia noted   Dermatology: Skin is warm and dry  Psych: Mentation is grossly normal, cognition is grossly normal. Affect is appropriate. DIAGNOSTIC RESULTS     EKG: All EKG's are interpreted by the Emergency Department Physician who either signs or Co-signs this chart in the [address] a cardiologist.    The EKG interpreted by me shows   RADIOLOGY:   Non-plain film images such as CT, Ultrasound and MRI are read by the radiologist. Plain radiographic images are visualized and preliminarily interpreted by the emergency physician with the below findings:      ? Radiologist's Report Reviewed:  XR FOOT RIGHT (2 VIEWS)   Final Result      Lumbar spine: 3 views demonstrate 5 lumbar type vertebral bodies. There is grade 1 retrolisthesis of L1. Mild-moderate degenerative disc disease is seen throughout the lumbar spine with relative sparing of L5-S1. Stable mild compression deformities of    T11 and T12. No acute compression fracture. Right tibia/fibula: 2 views demonstrate no abnormality. Left tibia/fibula: 2 views demonstrate no abnormality. Right foot: 2 views demonstrate no acute normality. There is hallux valgus deformity. Left foot: 2 views demonstrate mild degenerative changes at the first tarsal metatarsal joint. XR FOOT LEFT (2 VIEWS)   Final Result      Lumbar spine: 3 views demonstrate 5 lumbar type vertebral bodies. There is grade 1 retrolisthesis of L1. Mild-moderate degenerative disc disease is seen throughout the lumbar spine with relative sparing of L5-S1. Stable mild compression deformities of    T11 and T12. No acute compression fracture. Right tibia/fibula: 2 views demonstrate no abnormality.       Left tibia/fibula: 2 views demonstrate no abnormality. Right foot: 2 views demonstrate no acute normality. There is hallux valgus deformity. Left foot: 2 views demonstrate mild degenerative changes at the first tarsal metatarsal joint. XR LUMBAR SPINE (2-3 VIEWS)   Final Result      Lumbar spine: 3 views demonstrate 5 lumbar type vertebral bodies. There is grade 1 retrolisthesis of L1. Mild-moderate degenerative disc disease is seen throughout the lumbar spine with relative sparing of L5-S1. Stable mild compression deformities of    T11 and T12. No acute compression fracture. Right tibia/fibula: 2 views demonstrate no abnormality. Left tibia/fibula: 2 views demonstrate no abnormality. Right foot: 2 views demonstrate no acute normality. There is hallux valgus deformity. Left foot: 2 views demonstrate mild degenerative changes at the first tarsal metatarsal joint. XR TIBIA FIBULA LEFT (2 VIEWS)   Final Result      Lumbar spine: 3 views demonstrate 5 lumbar type vertebral bodies. There is grade 1 retrolisthesis of L1. Mild-moderate degenerative disc disease is seen throughout the lumbar spine with relative sparing of L5-S1. Stable mild compression deformities of    T11 and T12. No acute compression fracture. Right tibia/fibula: 2 views demonstrate no abnormality. Left tibia/fibula: 2 views demonstrate no abnormality. Right foot: 2 views demonstrate no acute normality. There is hallux valgus deformity. Left foot: 2 views demonstrate mild degenerative changes at the first tarsal metatarsal joint. XR TIBIA FIBULA RIGHT (2 VIEWS)   Final Result      Lumbar spine: 3 views demonstrate 5 lumbar type vertebral bodies. There is grade 1 retrolisthesis of L1. Mild-moderate degenerative disc disease is seen throughout the lumbar spine with relative sparing of L5-S1. Stable mild compression deformities of    T11 and T12. No acute compression fracture.       Right tibia/fibula: 2 views demonstrate no abnormality. Left tibia/fibula: 2 views demonstrate no abnormality. Right foot: 2 views demonstrate no acute normality. There is hallux valgus deformity. Left foot: 2 views demonstrate mild degenerative changes at the first tarsal metatarsal joint. ED BEDSIDE ULTRASOUND:   Performed by ED Physician - none    LABS:    I have reviewed and interpreted all of the currently available lab results from this visit (ifapplicable):  No results found for this visit on 09/15/21. All other labs were within normal range or not returned as of this dictation. EMERGENCY DEPARTMENT COURSE and DIFFERENTIAL DIAGNOSIS/MDM:   Vitals:    Vitals:    09/15/21 1143   BP: (!) 136/91   Pulse: 93   Resp: 18   Temp: 98.6 °F (37 °C)   TempSrc: Oral   SpO2: 98%   Weight: 185 lb (83.9 kg)   Height: 5' 11\" (1.803 m)       MEDICATIONS ADMINISTERED IN ED:  Medications - No data to display        The patient will follow-up with their PCP in 1-2 days for reevaluation. If the patient or family members have anyfurther concerns or any worsening symptoms they will return to the ED for reevaluation. CONSULTS:  None    PROCEDURES:  Procedures    CRITICAL CARE TIME    Total Critical Care time was 0 minutes, excluding separately reportable procedures. There was a high probability of clinically significant/life threatening deterioration in the patient's condition which required my urgent intervention. FINAL IMPRESSION      1. Strain of lumbar region, initial encounter    2. Osteoarthritis of lumbar spine, unspecified spinal osteoarthritis complication status    3. Contusion of knee and lower leg, left, initial encounter    4. Contusion of knee and lower leg, right, initial encounter    5. Foot sprain, left, initial encounter    6. Foot sprain, right, initial encounter    7.  Elevated BP without diagnosis of hypertension          DISPOSITION/PLAN   DISPOSITION        PATIENT REFERRED TO:  Wang Cruz Tarsha Snowden, APRN  [address]  [phone]    In 1 day        DISCHARGE MEDICATIONS:  New Prescriptions    IBUPROFEN (ADVIL;MOTRIN) 600 MG TABLET    Take 1 tablet by mouth 3 times daily as needed for Pain       Comment: Please note this report has been produced using speech recognition software and may contain errorsrelated to that system including errors in grammar, punctuation, and spelling, as well as words and phrases that may be inappropriate. If there are any questions or concerns please feel free to contact the dictating providerfor clarification.     Shaq Saenz MD  Attending Emergency Physician              Shaq Saenz MD  09/15/21 [id number]

## 2022-04-08 ENCOUNTER — APPOINTMENT (OUTPATIENT)
Dept: GENERAL RADIOLOGY | Facility: HOSPITAL | Age: 57
DRG: 191 | End: 2022-04-08
Payer: MEDICARE

## 2022-04-08 ENCOUNTER — HOSPITAL ENCOUNTER (INPATIENT)
Facility: HOSPITAL | Age: 57
LOS: 1 days | Discharge: HOME | DRG: 191 | End: 2022-04-09
Attending: HOSPITALIST | Admitting: INTERNAL MEDICINE
Payer: MEDICARE

## 2022-04-08 DIAGNOSIS — N17.9 ACUTE RENAL FAILURE, UNSPECIFIED ACUTE RENAL FAILURE TYPE (HCC): ICD-10-CM

## 2022-04-08 DIAGNOSIS — J44.1 COPD EXACERBATION (HCC): ICD-10-CM

## 2022-04-08 DIAGNOSIS — J18.9 PNEUMONIA DUE TO INFECTIOUS ORGANISM, UNSPECIFIED LATERALITY, UNSPECIFIED PART OF LUNG: Primary | ICD-10-CM

## 2022-04-08 DIAGNOSIS — J44.1 ACUTE EXACERBATION OF CHRONIC OBSTRUCTIVE PULMONARY DISEASE (HCC): ICD-10-CM

## 2022-04-08 DIAGNOSIS — A41.9 SEPTICEMIA (HCC): ICD-10-CM

## 2022-04-08 LAB
A/G RATIO: 1 (ref 0.8–2)
ALBUMIN SERPL-MCNC: 3.4 G/DL (ref 3.4–4.8)
ALP BLD-CCNC: 64 U/L (ref 25–100)
ALT SERPL-CCNC: 62 U/L (ref 4–36)
ANION GAP SERPL CALCULATED.3IONS-SCNC: 13 MMOL/L (ref 3–16)
AST SERPL-CCNC: 51 U/L (ref 8–33)
BASOPHILS ABSOLUTE: 0 K/UL (ref 0–0.1)
BASOPHILS RELATIVE PERCENT: 0.2 %
BILIRUB SERPL-MCNC: 0.7 MG/DL (ref 0.3–1.2)
BUN BLDV-MCNC: 88 MG/DL (ref 6–20)
CALCIUM SERPL-MCNC: 8.5 MG/DL (ref 8.5–10.5)
CHLORIDE BLD-SCNC: 103 MMOL/L (ref 98–107)
CO2: 21 MMOL/L (ref 20–30)
CREAT SERPL-MCNC: 2.3 MG/DL (ref 0.4–1.2)
EOSINOPHILS ABSOLUTE: 0.1 K/UL (ref 0–0.4)
EOSINOPHILS RELATIVE PERCENT: 0.5 %
GFR AFRICAN AMERICAN: 35
GFR NON-AFRICAN AMERICAN: 29
GLOBULIN: 3.5 G/DL
GLUCOSE BLD-MCNC: 131 MG/DL (ref 74–106)
HCT VFR BLD CALC: 36.3 % (ref 40–54)
HEMOGLOBIN: 12.8 G/DL (ref 13–18)
IMMATURE GRANULOCYTES #: 0.1 K/UL
IMMATURE GRANULOCYTES %: 0.7 % (ref 0–5)
LACTIC ACID, SEPSIS: 1.8 MMOL/L (ref 0.4–1.9)
LYMPHOCYTES ABSOLUTE: 2 K/UL (ref 1.5–4)
LYMPHOCYTES RELATIVE PERCENT: 9.8 %
MCH RBC QN AUTO: 32.7 PG (ref 27–32)
MCHC RBC AUTO-ENTMCNC: 35.3 G/DL (ref 31–35)
MCV RBC AUTO: 92.6 FL (ref 80–100)
MONOCYTES ABSOLUTE: 2.5 K/UL (ref 0.2–0.8)
MONOCYTES RELATIVE PERCENT: 12.5 %
NEUTROPHILS ABSOLUTE: 15.5 K/UL (ref 2–7.5)
NEUTROPHILS RELATIVE PERCENT: 76.3 %
PDW BLD-RTO: 13.5 % (ref 11–16)
PLATELET # BLD: 284 K/UL (ref 150–400)
PMV BLD AUTO: 11.3 FL (ref 6–10)
POTASSIUM REFLEX MAGNESIUM: 4.3 MMOL/L (ref 3.4–5.1)
PROCALCITONIN: 0.56 NG/ML (ref 0–0.15)
RAPID INFLUENZA  B AGN: NEGATIVE
RAPID INFLUENZA A AGN: NEGATIVE
RBC # BLD: 3.92 M/UL (ref 4.5–6)
SARS-COV-2, NAAT: NOT DETECTED
SODIUM BLD-SCNC: 137 MMOL/L (ref 136–145)
TOTAL CK: 682 U/L (ref 26–174)
TOTAL PROTEIN: 6.9 G/DL (ref 6.4–8.3)
TROPONIN: <0.3 NG/ML
WBC # BLD: 20.3 K/UL (ref 4–11)

## 2022-04-08 PROCEDURE — 94640 AIRWAY INHALATION TREATMENT: CPT

## 2022-04-08 PROCEDURE — 83605 ASSAY OF LACTIC ACID: CPT

## 2022-04-08 PROCEDURE — 96374 THER/PROPH/DIAG INJ IV PUSH: CPT

## 2022-04-08 PROCEDURE — 96375 TX/PRO/DX INJ NEW DRUG ADDON: CPT

## 2022-04-08 PROCEDURE — 36415 COLL VENOUS BLD VENIPUNCTURE: CPT

## 2022-04-08 PROCEDURE — G0378 HOSPITAL OBSERVATION PER HR: HCPCS

## 2022-04-08 PROCEDURE — 82550 ASSAY OF CK (CPK): CPT

## 2022-04-08 PROCEDURE — 84145 PROCALCITONIN (PCT): CPT

## 2022-04-08 PROCEDURE — 99283 EMERGENCY DEPT VISIT LOW MDM: CPT

## 2022-04-08 PROCEDURE — 6360000002 HC RX W HCPCS: Performed by: HOSPITALIST

## 2022-04-08 PROCEDURE — 96365 THER/PROPH/DIAG IV INF INIT: CPT

## 2022-04-08 PROCEDURE — 6370000000 HC RX 637 (ALT 250 FOR IP): Performed by: HOSPITALIST

## 2022-04-08 PROCEDURE — 87804 INFLUENZA ASSAY W/OPTIC: CPT

## 2022-04-08 PROCEDURE — 71045 X-RAY EXAM CHEST 1 VIEW: CPT

## 2022-04-08 PROCEDURE — 2580000003 HC RX 258: Performed by: PHYSICIAN ASSISTANT

## 2022-04-08 PROCEDURE — 6360000002 HC RX W HCPCS: Performed by: PHYSICIAN ASSISTANT

## 2022-04-08 PROCEDURE — 84484 ASSAY OF TROPONIN QUANT: CPT

## 2022-04-08 PROCEDURE — 80053 COMPREHEN METABOLIC PANEL: CPT

## 2022-04-08 PROCEDURE — 6370000000 HC RX 637 (ALT 250 FOR IP): Performed by: PHYSICIAN ASSISTANT

## 2022-04-08 PROCEDURE — 87070 CULTURE OTHR SPECIMN AEROBIC: CPT

## 2022-04-08 PROCEDURE — 87040 BLOOD CULTURE FOR BACTERIA: CPT

## 2022-04-08 PROCEDURE — 96372 THER/PROPH/DIAG INJ SC/IM: CPT

## 2022-04-08 PROCEDURE — 87186 SC STD MICRODIL/AGAR DIL: CPT

## 2022-04-08 PROCEDURE — 85025 COMPLETE CBC W/AUTO DIFF WBC: CPT

## 2022-04-08 PROCEDURE — 1200000000 HC SEMI PRIVATE

## 2022-04-08 PROCEDURE — 96376 TX/PRO/DX INJ SAME DRUG ADON: CPT

## 2022-04-08 PROCEDURE — 93005 ELECTROCARDIOGRAM TRACING: CPT

## 2022-04-08 PROCEDURE — 96361 HYDRATE IV INFUSION ADD-ON: CPT

## 2022-04-08 PROCEDURE — 87077 CULTURE AEROBIC IDENTIFY: CPT

## 2022-04-08 PROCEDURE — 86403 PARTICLE AGGLUT ANTBDY SCRN: CPT

## 2022-04-08 PROCEDURE — 87635 SARS-COV-2 COVID-19 AMP PRB: CPT

## 2022-04-08 PROCEDURE — 2580000003 HC RX 258: Performed by: HOSPITALIST

## 2022-04-08 PROCEDURE — 87205 SMEAR GRAM STAIN: CPT

## 2022-04-08 RX ORDER — ONDANSETRON 4 MG/1
4 TABLET, ORALLY DISINTEGRATING ORAL EVERY 8 HOURS PRN
Status: DISCONTINUED | OUTPATIENT
Start: 2022-04-08 | End: 2022-04-09 | Stop reason: HOSPADM

## 2022-04-08 RX ORDER — 0.9 % SODIUM CHLORIDE 0.9 %
30 INTRAVENOUS SOLUTION INTRAVENOUS ONCE
Status: COMPLETED | OUTPATIENT
Start: 2022-04-08 | End: 2022-04-08

## 2022-04-08 RX ORDER — SODIUM CHLORIDE 9 MG/ML
INJECTION, SOLUTION INTRAVENOUS CONTINUOUS
Status: ACTIVE | OUTPATIENT
Start: 2022-04-08 | End: 2022-04-09

## 2022-04-08 RX ORDER — METHYLPREDNISOLONE SODIUM SUCCINATE 125 MG/2ML
125 INJECTION, POWDER, LYOPHILIZED, FOR SOLUTION INTRAMUSCULAR; INTRAVENOUS ONCE
Status: COMPLETED | OUTPATIENT
Start: 2022-04-08 | End: 2022-04-08

## 2022-04-08 RX ORDER — 0.9 % SODIUM CHLORIDE 0.9 %
500 INTRAVENOUS SOLUTION INTRAVENOUS ONCE
Status: DISCONTINUED | OUTPATIENT
Start: 2022-04-08 | End: 2022-04-08

## 2022-04-08 RX ORDER — ACETAMINOPHEN 325 MG/1
650 TABLET ORAL EVERY 6 HOURS PRN
Status: DISCONTINUED | OUTPATIENT
Start: 2022-04-08 | End: 2022-04-09 | Stop reason: HOSPADM

## 2022-04-08 RX ORDER — ALBUTEROL SULFATE 90 UG/1
2 AEROSOL, METERED RESPIRATORY (INHALATION)
COMMUNITY

## 2022-04-08 RX ORDER — LATANOPROST 50 UG/ML
1 SOLUTION/ DROPS OPHTHALMIC NIGHTLY
COMMUNITY

## 2022-04-08 RX ORDER — ONDANSETRON 2 MG/ML
4 INJECTION INTRAMUSCULAR; INTRAVENOUS EVERY 6 HOURS PRN
Status: DISCONTINUED | OUTPATIENT
Start: 2022-04-08 | End: 2022-04-09 | Stop reason: HOSPADM

## 2022-04-08 RX ORDER — PROPRANOLOL HYDROCHLORIDE 10 MG/1
10 TABLET ORAL 2 TIMES DAILY
Status: DISCONTINUED | OUTPATIENT
Start: 2022-04-08 | End: 2022-04-09 | Stop reason: HOSPADM

## 2022-04-08 RX ORDER — METHYLPREDNISOLONE SODIUM SUCCINATE 40 MG/ML
40 INJECTION, POWDER, LYOPHILIZED, FOR SOLUTION INTRAMUSCULAR; INTRAVENOUS DAILY
Status: DISCONTINUED | OUTPATIENT
Start: 2022-04-09 | End: 2022-04-09 | Stop reason: HOSPADM

## 2022-04-08 RX ORDER — BENZONATATE 100 MG/1
100 CAPSULE ORAL 3 TIMES DAILY PRN
Status: DISCONTINUED | OUTPATIENT
Start: 2022-04-08 | End: 2022-04-09 | Stop reason: HOSPADM

## 2022-04-08 RX ORDER — HYDROCODONE POLISTIREX AND CHLORPHENIRAMINE POLISTIREX 10; 8 MG/5ML; MG/5ML
5 SUSPENSION, EXTENDED RELEASE ORAL 2 TIMES DAILY PRN
Status: DISCONTINUED | OUTPATIENT
Start: 2022-04-08 | End: 2022-04-09 | Stop reason: HOSPADM

## 2022-04-08 RX ORDER — POLYETHYLENE GLYCOL 3350 17 G/17G
17 POWDER, FOR SOLUTION ORAL DAILY PRN
Status: DISCONTINUED | OUTPATIENT
Start: 2022-04-08 | End: 2022-04-09 | Stop reason: HOSPADM

## 2022-04-08 RX ORDER — LISINOPRIL 30 MG/1
30 TABLET ORAL DAILY
Status: ON HOLD | COMMUNITY
End: 2022-04-09 | Stop reason: SDUPTHER

## 2022-04-08 RX ORDER — BUDESONIDE 0.5 MG/2ML
0.5 INHALANT ORAL 2 TIMES DAILY
Status: DISCONTINUED | OUTPATIENT
Start: 2022-04-08 | End: 2022-04-09 | Stop reason: HOSPADM

## 2022-04-08 RX ORDER — SODIUM CHLORIDE 9 MG/ML
1000 INJECTION, SOLUTION INTRAVENOUS CONTINUOUS
Status: DISCONTINUED | OUTPATIENT
Start: 2022-04-08 | End: 2022-04-08

## 2022-04-08 RX ORDER — IPRATROPIUM BROMIDE AND ALBUTEROL SULFATE 2.5; .5 MG/3ML; MG/3ML
1 SOLUTION RESPIRATORY (INHALATION)
Status: DISCONTINUED | OUTPATIENT
Start: 2022-04-08 | End: 2022-04-08

## 2022-04-08 RX ORDER — GABAPENTIN 300 MG/1
300 CAPSULE ORAL 3 TIMES DAILY
Status: DISCONTINUED | OUTPATIENT
Start: 2022-04-08 | End: 2022-04-09

## 2022-04-08 RX ORDER — IPRATROPIUM BROMIDE AND ALBUTEROL SULFATE 2.5; .5 MG/3ML; MG/3ML
1 SOLUTION RESPIRATORY (INHALATION)
Status: DISCONTINUED | OUTPATIENT
Start: 2022-04-08 | End: 2022-04-09 | Stop reason: HOSPADM

## 2022-04-08 RX ORDER — BACITRACIN, NEOMYCIN, POLYMYXIN B 400; 3.5; 5 [USP'U]/G; MG/G; [USP'U]/G
OINTMENT TOPICAL DAILY
Status: DISCONTINUED | OUTPATIENT
Start: 2022-04-08 | End: 2022-04-09 | Stop reason: HOSPADM

## 2022-04-08 RX ORDER — 0.9 % SODIUM CHLORIDE 0.9 %
1000 INTRAVENOUS SOLUTION INTRAVENOUS ONCE
Status: COMPLETED | OUTPATIENT
Start: 2022-04-08 | End: 2022-04-08

## 2022-04-08 RX ORDER — ACETAMINOPHEN 650 MG/1
650 SUPPOSITORY RECTAL EVERY 6 HOURS PRN
Status: DISCONTINUED | OUTPATIENT
Start: 2022-04-08 | End: 2022-04-09 | Stop reason: HOSPADM

## 2022-04-08 RX ORDER — ACETAMINOPHEN 500 MG
1000 TABLET ORAL ONCE
Status: COMPLETED | OUTPATIENT
Start: 2022-04-08 | End: 2022-04-08

## 2022-04-08 RX ORDER — LATANOPROST 50 UG/ML
1 SOLUTION/ DROPS OPHTHALMIC NIGHTLY
Status: DISCONTINUED | OUTPATIENT
Start: 2022-04-08 | End: 2022-04-09 | Stop reason: HOSPADM

## 2022-04-08 RX ORDER — PROPRANOLOL HYDROCHLORIDE 10 MG/1
10 TABLET ORAL 2 TIMES DAILY
COMMUNITY

## 2022-04-08 RX ADMIN — CEFTRIAXONE 1000 MG: 1 INJECTION, POWDER, FOR SOLUTION INTRAMUSCULAR; INTRAVENOUS at 12:44

## 2022-04-08 RX ADMIN — BACITRACIN ZINC, NEOMYCIN, POLYMYXIN B: 400; 3.5; 5 OINTMENT TOPICAL at 20:58

## 2022-04-08 RX ADMIN — GABAPENTIN 300 MG: 300 CAPSULE ORAL at 15:25

## 2022-04-08 RX ADMIN — IPRATROPIUM BROMIDE AND ALBUTEROL SULFATE 1 AMPULE: 2.5; .5 SOLUTION RESPIRATORY (INHALATION) at 11:16

## 2022-04-08 RX ADMIN — SODIUM CHLORIDE 1000 ML: 9 INJECTION, SOLUTION INTRAVENOUS at 10:44

## 2022-04-08 RX ADMIN — SODIUM CHLORIDE: 9 INJECTION, SOLUTION INTRAVENOUS at 14:51

## 2022-04-08 RX ADMIN — ACETAMINOPHEN 1000 MG: 500 TABLET, FILM COATED ORAL at 12:48

## 2022-04-08 RX ADMIN — AZITHROMYCIN DIHYDRATE 500 MG: 500 INJECTION, POWDER, LYOPHILIZED, FOR SOLUTION INTRAVENOUS at 13:27

## 2022-04-08 RX ADMIN — IPRATROPIUM BROMIDE AND ALBUTEROL SULFATE 1 AMPULE: 2.5; .5 SOLUTION RESPIRATORY (INHALATION) at 17:01

## 2022-04-08 RX ADMIN — ENOXAPARIN SODIUM 40 MG: 40 INJECTION SUBCUTANEOUS at 20:57

## 2022-04-08 RX ADMIN — BENZONATATE 100 MG: 100 CAPSULE ORAL at 23:33

## 2022-04-08 RX ADMIN — BUDESONIDE 500 MCG: 0.5 SUSPENSION RESPIRATORY (INHALATION) at 17:00

## 2022-04-08 RX ADMIN — METHYLPREDNISOLONE SODIUM SUCCINATE 125 MG: 125 INJECTION, POWDER, FOR SOLUTION INTRAMUSCULAR; INTRAVENOUS at 10:43

## 2022-04-08 RX ADMIN — LATANOPROST 1 DROP: 50 SOLUTION OPHTHALMIC at 23:33

## 2022-04-08 RX ADMIN — SODIUM CHLORIDE 1500 ML: 9 INJECTION, SOLUTION INTRAVENOUS at 11:39

## 2022-04-08 RX ADMIN — GABAPENTIN 300 MG: 300 CAPSULE ORAL at 20:57

## 2022-04-08 ASSESSMENT — PAIN DESCRIPTION - PAIN TYPE: TYPE: CHRONIC PAIN

## 2022-04-08 ASSESSMENT — PAIN SCALES - GENERAL
PAINLEVEL_OUTOF10: 7
PAINLEVEL_OUTOF10: 6

## 2022-04-08 NOTE — PLAN OF CARE
Problem: Infection:  Goal: Will remain free from infection  Description: Will remain free from infection  Outcome: Ongoing     Problem: Daily Care:  Goal: Daily care needs are met  Description: Daily care needs are met  Outcome: Met This Shift     Problem: Pain:  Goal: Patient's pain/discomfort is manageable  Description: Patient's pain/discomfort is manageable  Outcome: Met This Shift     Problem: Skin Integrity:  Goal: Skin integrity will stabilize  Description: Skin integrity will stabilize  Outcome: Ongoing     Problem: Discharge Planning:  Goal: Patients continuum of care needs are met  Description: Patients continuum of care needs are met  Outcome: Ongoing

## 2022-04-08 NOTE — ED TRIAGE NOTES
Pt arrived to ED with complaint of productive cough and possible fevers for 3 days. Pt states he waited 3 days to visit ED due to need for \"government\" transportation. Pt has history of pneumonia and believes he has pneumonia at this time. Pt has been taking OTC medications to treat his fever at home.

## 2022-04-08 NOTE — ED NOTES
Spoke with Dr Tushar Drake concerning Lactate results and he verbalizes second lactic order will be cancelled.      Margarita Davison RN  04/08/22 1120

## 2022-04-08 NOTE — ED PROVIDER NOTES
[facility]      Pt Name: Adrian Burgess  MRN: [id number]  YOB: 1965  Date of evaluation: 4/8/2022  Provider: Kerline Khan, [facility]       Chief Complaint   Patient presents with    Cough         HISTORY OF PRESENT ILLNESS  (Location/Symptom, Timing/Onset, Context/Setting, Quality, Duration, Modifying Factors, Severity.)   Adrian Burgess is a 59 y.o. male who presents to the emergency department for cough and shortness of breath. Patient states that the symptoms started about the 48 hours ago but worse over the last 24. Patient states he has been able to cough and had productive sputum. Patient states he believes he is coughed about a coffee pot full of thick sputum up. He states his numerous colors between yellow-green and brown at time. He denies any hemoptysis or blood in the sputum. Patient states he does get sensation that he short of breath at time especially states he woke up several times last night from sleep feeling like he was short of breath. He denies any body aches out of ordinary but he states he always has body aches because he has CMT (Charcot-Lakia-Tooth disease). Patient does have a known history of CAD and microinfarction in the past.  His had a history of colon cancer. He does have COPD. Patient states he uses inhalers at home but does not have a nebulizer machine. States he smokes half a pack a day and smoked for about 50 years. Patient denies any chest pain with the symptoms. States he felt like he had a fever yesterday but he did not check his temperature. Denies any nausea vomiting or diarrhea. Denies any headaches out of ordinary. Denies any loss of taste or smell. Patient is vaccinated for COVID-19. States he got the [employer] vaccination and then was boosted which she believes was Mauri Flores but is not completely sure. Patient lives at home with his son.   Denies any sick contacts that he is aware of. Patient states he has not been eating much over last several days he states he has been trying to drink fluids but has been drinking Sprite and states he does drink some juice occasionally. Patient advised that he is not urinating as much as he normally does. Nursing notes were reviewed. REVIEW OFSYSTEMS    (2-9 systems for level 4, 10 or more for level 5)   ROS:  General:  + fevers, no chills, no weakness, + malaise  Cardiovascular:  No chest pain, no palpitations  Respiratory:  + shortness of breath, +cough-productive, no wheezing  Gastrointestinal:  No pain, no nausea, no vomiting, no diarrhea  Musculoskeletal:  No muscle pain, no joint pain  Skin:  No rash, no easy bruising  Neurologic:  No speech problems, no headache, no extremity weakness  Psychiatric:  No anxiety  Genitourinary:  No dysuria, no hematuria    Except as noted above the remainder of the review of systems was reviewed and negative. PAST MEDICAL HISTORY     Past Medical History:   Diagnosis Date    CAD (coronary artery disease)     [facility]     Charcot-Lakia-Tooth disease     Colon cancer ([facility]     COPD (chronic obstructive pulmonary disease) ([facility]     MI (myocardial infarction) ([facility]     x2    Muscular dystrophies ([facility]          SURGICAL HISTORY       Past Surgical History:   Procedure Laterality Date    ANKLE SURGERY      bilateral    ARM SURGERY Bilateral     Crush injury    BRAIN SURGERY      skull injury, metal palte in head    COLONOSCOPY      patient states colon cancer    FRACTURE SURGERY      JOINT REPLACEMENT      left wrist    TONSILLECTOMY           CURRENT MEDICATIONS       Previous Medications    ALBUTEROL-IPRATROPIUM (COMBIVENT RESPIMAT)  MCG/ACT AERS INHALER    Inhale 1 puff into the lungs every 6 hours    GABAPENTIN (NEURONTIN) 300 MG CAPSULE    TAKE ONE CAPSULE BY MOUTH THREE TIMES A DAY       ALLERGIES     Patient has no known allergies.     FAMILY HISTORY Family History   Problem Relation Age of Onset    Other Father         sharmaine johnston tooth    Heart Attack Father     Cancer Father         unknown    Heart Attack Brother           SOCIAL HISTORY       Social History     Socioeconomic History    Marital status:      Spouse name: Not on file    Number of children: Not on file    Years of education: Not on file    Highest education level: Not on file   Occupational History    Not on file   Tobacco Use    Smoking status: Current Every Day Smoker     Packs/day: 0.50     Years: 40.00     Pack years: 20.00     Types: Cigarettes    Smokeless tobacco: Never Used   Vaping Use    Vaping Use: Never used   Substance and Sexual Activity    Alcohol use: No    Drug use: No    Sexual activity: Yes     Partners: Female   Other Topics Concern    Not on file   Social History Narrative    Not on file     Social Determinants of Health     Financial Resource Strain:     Difficulty of Paying Living Expenses: Not on file   Food Insecurity:     Worried About Running Out of Food in the Last Year: Not on file    Tammy of Food in the Last Year: Not on file   Transportation Needs:     Lack of Transportation (Medical): Not on file    Lack of Transportation (Non-Medical):  Not on file   Physical Activity:     Days of Exercise per Week: Not on file    Minutes of Exercise per Session: Not on file   Stress:     Feeling of Stress : Not on file   Social Connections:     Frequency of Communication with Friends and Family: Not on file    Frequency of Social Gatherings with Friends and Family: Not on file    Attends Advent Services: Not on file    Active Member of Clubs or Organizations: Not on file    Attends Club or Organization Meetings: Not on file    Marital Status: Not on file   Intimate Partner Violence:     Fear of Current or Ex-Partner: Not on file    Emotionally Abused: Not on file    Physically Abused: Not on file    Sexually Abused: Not on file   Housing Stability:     Unable to Pay for Housing in the Last Year: Not on file    Number of Places Lived in the Last Year: Not on file    Unstable Housing in the Last Year: Not on file         PHYSICAL EXAM    (up to 7 for level 4, 8 or more for level 5)     ED Triage Vitals   BP Temp Temp Source Pulse Resp SpO2 Height Weight   04/08/22 1027 04/08/22 1025 04/08/22 1025 04/08/22 1025 04/08/22 1025 04/08/22 1025 04/08/22 1027 04/08/22 1027   90/62 98.6 °F (37 °C) Oral 96 20 96 % 5' 11\" (1.803 m) 180 lb (81.6 kg)       Physical Exam  General :Patient is awake, alert, oriented, in no acute distress, nontoxic appearing  HEENT: Pupils are equally round and reactive to light, EOMI, conjunctivae clear. Oral mucosa is moist, no exudate. Uvula is midline  Cardiac: Heart regular rate, rhythm, no murmurs, rubs, or gallops  Lungs: No obvious rhonchi heard on examination there is a mildly decreased breath sounds bibasilarly with a mild expiratory wheeze. No crackles noted. There is no use of accessory muscles. Abdomen: Abdomen is soft, nontender, nondistended. There is no firm or pulsatile masses, no rebound rigidity or guarding. Musculoskeletal: No focal muscle deficits are appreciated  Neuro: Motor intact, sensory intact, level of consciousness is normal  Dermatology: Skin is warm and dry  Psych: Mentation is grossly normal, cognition is grossly normal. Affect is appropriate. DIAGNOSTIC RESULTS     EKG: All EKG's are interpreted by the Emergency Department Physician who either signs or Co-signs this chart in the [address] a cardiologist.    The EKG interpreted by me shows sinus rhythm. Abnormal R wave progression. Early transition. Minimal ST depression in the inferior leads. Heart rate is 94 bpm, TN interval is 160 ms, QRS durations 83, QT is 329 QTC is 412 ms. No acute T wave inversions concerning for acute myocardial ischemia.   No ST elevations concerning for acute myocardial infarction. RADIOLOGY:   Non-plain film images such as CT, Ultrasound and MRI are read by the radiologist. Plain radiographic images are visualized and preliminarily interpreted by the emergency physician with the below findings:      ? Radiologist's Report Reviewed:  XR CHEST PORTABLE   Final Result      No acute cardiopulmonary disease.             ED BEDSIDE ULTRASOUND:   Performed by ED Physician - none    LABS:    I have reviewed and interpreted all of the currently available lab results from this visit (ifapplicable):  Results for orders placed or performed during the hospital encounter of 04/08/22   Rapid Influenza A/B Antigens    Specimen: Nasopharyngeal   Result Value Ref Range    Rapid Influenza A Ag Negative Negative    Rapid Influenza B Ag Negative Negative   COVID-19, Rapid    Specimen: Nasopharyngeal Swab   Result Value Ref Range    SARS-CoV-2, NAAT Not Detected Not Detected   CBC with Auto Differential   Result Value Ref Range    WBC 20.3 (H) 4.0 - 11.0 K/uL    RBC 3.92 (L) 4.50 - 6.00 M/uL    Hemoglobin 12.8 (L) 13.0 - 18.0 g/dL    Hematocrit 36.3 (L) 40.0 - 54.0 %    MCV 92.6 80.0 - 100.0 fL    MCH 32.7 (H) 27.0 - 32.0 pg    MCHC 35.3 (H) 31.0 - 35.0 g/dL    RDW 13.5 11.0 - 16.0 %    Platelets 735 552 - 324 K/uL    MPV 11.3 (H) 6.0 - 10.0 fL    Neutrophils % 76.3 %    Immature Granulocytes % 0.7 0.0 - 5.0 %    Lymphocytes % 9.8 %    Monocytes % 12.5 %    Eosinophils % 0.5 %    Basophils % 0.2 %    Neutrophils Absolute 15.5 (H) 2.0 - 7.5 K/uL    Immature Granulocytes # 0.1 K/uL    Lymphocytes Absolute 2.0 1.5 - 4.0 K/uL    Monocytes Absolute 2.5 (H) 0.2 - 0.8 K/uL    Eosinophils Absolute 0.1 0.0 - 0.4 K/uL    Basophils Absolute 0.0 0.0 - 0.1 K/uL   Comprehensive Metabolic Panel w/ Reflex to MG   Result Value Ref Range    Sodium 137 136 - 145 mmol/L    Potassium reflex Magnesium 4.3 3.4 - 5.1 mmol/L    Chloride 103 98 - 107 mmol/L    CO2 21 20 - 30 mmol/L    Anion Gap 13 3 - 16    Glucose 131 (H) 74 - 106 mg/dL    BUN 88 (HH) 6 - 20 mg/dL    CREATININE 2.3 (H) 0.4 - 1.2 mg/dL    GFR Non-African American 29 (L) >59    GFR  35 (L) >59    Calcium 8.5 8.5 - 10.5 mg/dL    Total Protein 6.9 6.4 - 8.3 g/dL    Albumin 3.4 3.4 - 4.8 g/dL    Albumin/Globulin Ratio 1.0 0.8 - 2.0    Total Bilirubin 0.7 0.3 - 1.2 mg/dL    Alkaline Phosphatase 64 25 - 100 U/L    ALT 62 (H) 4 - 36 U/L    AST 51 (H) 8 - 33 U/L    Globulin 3.5 Not Established g/dL   Troponin   Result Value Ref Range    Troponin <0.30 <0.30 ng/mL   Lactate, Sepsis   Result Value Ref Range    Lactic Acid, Sepsis 1.8 0.4 - 1.9 mmol/L        All other labs were within normal range or not returned as of this dictation. EMERGENCY DEPARTMENT COURSE and DIFFERENTIAL DIAGNOSIS/MDM:   Vitals:    Vitals:    04/08/22 1059 04/08/22 1114 04/08/22 1117 04/08/22 1129   BP: 98/67 112/65  110/81   Pulse: 84 86  83   Resp: 29 21  27   Temp:       TempSrc:       SpO2: 94% 99% 96% 95%   Weight:       Height:           MEDICATIONS ADMINISTERED IN ED:  Medications   0.9 % sodium chloride infusion (has no administration in time range)   cefTRIAXone (ROCEPHIN) 1000 mg IVPB in 50 mL D5W minibag (has no administration in time range)     And   azithromycin (ZITHROMAX) 500 mg in dextrose 5 % 250 mL IVPB ([id number]) (has no administration in time range)   methylPREDNISolone sodium (SOLU-MEDROL) injection 125 mg (125 mg IntraVENous Given 4/8/22 1043)   0.9 % sodium chloride bolus (1,000 mLs IntraVENous New Bag 4/8/22 1044)   0.9 % sodium chloride IV bolus 2,448 mL (1,500 mLs IntraVENous New Bag 4/8/22 1139)       After initial evaluation examination I did have a conversation with the patient about the upcoming plan, treatment possible disposition which they were agreeable to the times dictation. Patient vies we give a fluid bolus of normal saline at 30 mg/kg per sepsis protocol for his hypotension. He also be given Solu-Medrol 125 mg IV.   Patient will have DuoNeb performed after rapid Covid screen is been performed. If it is negative then we will continue with the DuoNeb but if is positive then will provide him with albuterol MDI for use. Patient with portable chest radiograph performed to rule out pneumonic process since he states he did have fever yesterday and has been coughing up a considerable amount of thick sputum. Patient will have CBC, CMP, troponin, blood cultures x2, lactic acid sepsis protocol, rapid influenza and Covid screen. Patient will also have twelve-lead EKG performed. Patient's final disposition will determine once his radiological diagnostic studies been performed reviewed however at this time the patient is pulse ox is has been between 90 to 95% on room air. Respiratory rate is between 19 and 22. Patient is not tachycardic but blood pressure is in the 90 range systolic. However the patient does not seem toxic at this time. Rapid Covid screen was negative    Rapid influenza screen was negative    Blood work showed white count 20,300, hemoglobin is 12.8, hematocrit is 36.3, platelet counts 002. Comprehensive metabolic panel was benign except for a BUN of 88 which is elevated and a creatinine of 2.3. Troponin was nondetectable less than 0.30. Lactic acid was normal at 1.8    Previous blood work back on 11/23/2020 showed a creatinine of 0.7 and a BUN of 12. Portable chest radiograph read by radiology as no acute cardiopulmonary disease. Patient's radiological diagnostic studies were discussed with him and he does state his understanding. The patient's symptoms though even though he has a negative chest radiograph he does have a 20,000 white count and with his presentation of fever cough and is productive especially with the cold or the productive sputum that he has I do suspect he probably has underlying pneumonia that is just not radiographically evident at this time because of his vascularly dry presentation.   His creatinine is elevated and his BUN is elevated his blood pressure was initially in the low 90 range but he is receiving fluid resuscitation at this time he is now up to 112/65. He is receiving a full liter bolus of normal saline and will probably require continuous fluid resuscitation afterwards. Patient will be go ahead and started on Rocephin 1 g IV and Zithromax 500 mg IV for broad-spectrum coverage for pneumonia. Case discussed with the on-call hospitalist and she was agreeable to admit the patient for further evaluation work-up. We are admitting him for suspected pneumonia even though there is no radiographic evidence of this but clinically the patient does meet criteria, acute renal failure, septicemia and acute exacerbation of chronic obstructive pulmonary disease. ED crit    CRITICAL CARE:  The high probability of sudden, clinically significant deterioration in the patient's condition required the highest level of my preparedness to intervene urgently. The services I provided to this patient were to treat and/or prevent clinically significant deterioration that could result in: In cardiopulmonary vascular symptoms/compromise/demise. Vidal Hernandez Services included the following: chart data review, reviewing nursing notes and/or old charts, documentation time, consultant collaboration regarding findings and treatment options, medication orders and management, direct patient care, re-evaluations, vital sign assessments and ordering, interpreting and reviewing diagnostic studies/lab tests. Aggregate critical care time was 35 minutes, which includes only time during which I was engaged in work directly related to the patient's care, as described above, whether at the bedside or elsewhere in the Emergency Department. It did not include time spent performing other reported procedures or the services of residents, students, nurses or physician assistants.       SEP-1 CORE MEASURE DATA      Sepsis Criteria   Severe Sepsis Criteria   Septic Shock Criteria     Must be confirmed or suspected to move forward with diagnosis of sepsis. Must meet 2:    [] Temperature > 100.9 F (38.3 C)        or < 96.8 F (36 C)  [] HR > 90  [x] RR > 20  [x] WBC > 12 or < 4 or 10% bands    AND:    [x] Infection Confirmed or        Suspected. OR:    [] Exclude from SEP-1 because:    [] No infection present or suspected  [] Does not have 2+ SIRS criteria but may have an incidental infection that requires treatment  [] May have sepsis, but does not meet criteria for severe sepsis or septic shock  [] Alternative explanation for abnormal labs and/or vitals (see MDM)  [] Viral etiology found or highly suspected (including COVID-19) without concomitant bacterial infection   Must meet 1:    [] Lactate > 2       or   [x] Signs of Organ Dysfunction:    - SBP < 90 or MAP < 65  - Altered mental status  - Creatinine > 2 or increased from      baseline  - Urine Output < 0.5 ml/kg/hr  - Bilirubin > 2  - INR > 1.5 (not anticoagulated)  - Platelets < 359,164  - Acute Respiratory Failure as     evidenced by new need for NIPPV     or mechanical ventilation        [] No criteria met for Severe Sepsis. Must meet 1:    [] Lactate > 4        or   [] SBP < 90 or MAP < 65 for at        least two readings in the first        hour after fluid bolus        administration      [] Vasopressors initiated (if hypotension persists after fluid resuscitation)                [x] No criteria met for Septic Shock.    Patient Vitals for the past 6 hrs:   BP Temp Pulse Resp SpO2 Height Weight Weight Method Percent Weight Change   04/08/22 1025 -- 98.6 °F (37 °C) 96 20 96 % -- -- -- --   04/08/22 1027 90/62 -- -- -- -- 5' 11\" (1.803 m) 180 lb (81.6 kg) Stated 0   04/08/22 1029 98/61 -- 90 22 94 % -- -- -- --   04/08/22 1044 90/64 -- 89 29 95 % -- -- -- --   04/08/22 1059 98/67 -- 84 29 94 % -- -- -- --   04/08/22 1114 112/65 -- 86 21 99 % -- -- -- --   04/08/22 1117 -- -- -- -- 96 % -- -- -- --   04/08/22 1129 110/81 -- 83 27 95 % -- -- -- --      Recent Labs     04/08/22  1030   WBC 20.3*   CREATININE 2.3*   BILITOT 0.7            Sepsis Time Identified: 1100    Fluid Resuscitation Rational: at least 30mL/kg based on entered actual weight at time of triage    Infection Source: Pulmonary - Community Acquired    Repeat lactate level: Repeat lactic acid not needed since initial 1 is normal.    Reassessment Exam:   I have reassessed tissue perfusion and hemodynamic status after fluid bolus at this time: Has improved. Patient's blood pressure is now open to the low 100 range presently is 110/81. Heart rate is 80, SPO2 is 94% on room air. Respiratory rate is 21. Patient is still afebrile. CONSULTS:  None    PROCEDURES:  Procedures    CRITICAL CARE TIME    Total Critical Care time was 0 minutes, excluding separately reportable procedures. There was a high probability of clinically significant/life threatening deterioration in the patient's condition which required my urgent intervention. FINAL IMPRESSION      1. Pneumonia due to infectious organism, unspecified laterality, unspecified part of lung    2. Acute renal failure, unspecified acute renal failure type ([address]    3. Septicemia ([facility] [address]    4. Acute exacerbation of chronic obstructive pulmonary disease (HCC)          DISPOSITION/PLAN   DISPOSITION        PATIENT REFERRED TO:  No follow-up provider specified. DISCHARGE MEDICATIONS:  New Prescriptions    No medications on file       Comment: Please note this report has been produced using speech recognition software and may contain errorsrelated to that system including errors in grammar, punctuation, and spelling, as well as words and phrases that may be inappropriate. If there are any questions or concerns please feel free to contact the dictating providerfor clarification.     Pernell Mcbride DO  Attending Emergency Physician              Pernell Mcbride DO  04/08/22 1621

## 2022-04-08 NOTE — ED NOTES
Mick Ricomario is admitted to Room 11 on medical unit. [address] [id number] [address] Alejandra is to call for report after she completes discharges.       Ava Farmer RN  04/08/22 6043

## 2022-04-08 NOTE — ED NOTES
I attempted IV 2x with no success. RN advised.     Rivera Bruce - student RACHEL Olea, RACHEL  04/08/22 9910

## 2022-04-08 NOTE — PROGRESS NOTES
Medication Reconciliation  Med rec performed utilizing fill history from [facility], [facility] and per the pt. See notes below:        -Added the following medications per fill history and per the pt. Latanoprost      Propranolol      Lisinopril      Albuterol      -Changed the following medication per fill history and per the pt. Gabapentin 300mg from  1 cap 3 times a day to 3 Caps 3 times a day.        -Of note:      I spoke with the pt to confirm all medications he is currently taking at home.         Cheral Collet MA

## 2022-04-09 VITALS
OXYGEN SATURATION: 97 % | DIASTOLIC BLOOD PRESSURE: 74 MMHG | RESPIRATION RATE: 20 BRPM | TEMPERATURE: 97.5 F | HEIGHT: 71 IN | BODY MASS INDEX: 25.2 KG/M2 | WEIGHT: 180 LBS | SYSTOLIC BLOOD PRESSURE: 148 MMHG | HEART RATE: 73 BPM

## 2022-04-09 PROBLEM — Z87.891 PERSONAL HISTORY OF NICOTINE DEPENDENCE: Status: ACTIVE | Noted: 2022-04-09

## 2022-04-09 PROBLEM — I95.9 HYPOTENSION: Status: ACTIVE | Noted: 2022-04-09

## 2022-04-09 PROBLEM — A41.9 SEPSIS (HCC): Status: ACTIVE | Noted: 2022-04-09

## 2022-04-09 PROBLEM — Z86.79 HISTORY OF CHRONIC HYPERTENSION: Status: ACTIVE | Noted: 2022-04-09

## 2022-04-09 PROBLEM — N17.9 ACUTE RENAL FAILURE (ARF) (HCC): Status: ACTIVE | Noted: 2022-04-09

## 2022-04-09 LAB
A/G RATIO: 0.8 (ref 0.8–2)
ALBUMIN SERPL-MCNC: 2.6 G/DL (ref 3.4–4.8)
ALP BLD-CCNC: 52 U/L (ref 25–100)
ALT SERPL-CCNC: 47 U/L (ref 4–36)
ANION GAP SERPL CALCULATED.3IONS-SCNC: 10 MMOL/L (ref 3–16)
AST SERPL-CCNC: 32 U/L (ref 8–33)
BASOPHILS ABSOLUTE: 0.1 K/UL (ref 0–0.1)
BASOPHILS RELATIVE PERCENT: 0.2 %
BILIRUB SERPL-MCNC: 0.4 MG/DL (ref 0.3–1.2)
BUN BLDV-MCNC: 38 MG/DL (ref 6–20)
CALCIUM SERPL-MCNC: 8.2 MG/DL (ref 8.5–10.5)
CHLORIDE BLD-SCNC: 115 MMOL/L (ref 98–107)
CO2: 20 MMOL/L (ref 20–30)
CREAT SERPL-MCNC: 0.8 MG/DL (ref 0.4–1.2)
EOSINOPHILS ABSOLUTE: 0.1 K/UL (ref 0–0.4)
EOSINOPHILS RELATIVE PERCENT: 0.4 %
FOLATE: 7.88 NG/ML
GFR AFRICAN AMERICAN: >59
GFR NON-AFRICAN AMERICAN: >59
GLOBULIN: 3.1 G/DL
GLUCOSE BLD-MCNC: 141 MG/DL (ref 74–106)
HCT VFR BLD CALC: 33.3 % (ref 40–54)
HEMOGLOBIN: 11.1 G/DL (ref 13–18)
IMMATURE GRANULOCYTES #: 0.4 K/UL
IMMATURE GRANULOCYTES %: 1.8 % (ref 0–5)
LYMPHOCYTES ABSOLUTE: 1.1 K/UL (ref 1.5–4)
LYMPHOCYTES RELATIVE PERCENT: 4.9 %
MCH RBC QN AUTO: 31.7 PG (ref 27–32)
MCHC RBC AUTO-ENTMCNC: 33.3 G/DL (ref 31–35)
MCV RBC AUTO: 95.1 FL (ref 80–100)
MONOCYTES ABSOLUTE: 2.4 K/UL (ref 0.2–0.8)
MONOCYTES RELATIVE PERCENT: 10.5 %
NEUTROPHILS ABSOLUTE: 18.7 K/UL (ref 2–7.5)
NEUTROPHILS RELATIVE PERCENT: 82.2 %
PDW BLD-RTO: 14 % (ref 11–16)
PLATELET # BLD: 288 K/UL (ref 150–400)
PMV BLD AUTO: 10.7 FL (ref 6–10)
POTASSIUM REFLEX MAGNESIUM: 4.6 MMOL/L (ref 3.4–5.1)
PROCALCITONIN: 0.2 NG/ML (ref 0–0.15)
RBC # BLD: 3.5 M/UL (ref 4.5–6)
SODIUM BLD-SCNC: 145 MMOL/L (ref 136–145)
T3 TOTAL: 0.9 NG/ML (ref 0.8–2)
T4 FREE: 1.18 NG/DL (ref 0.89–1.76)
TOTAL CK: 247 U/L (ref 26–174)
TOTAL PROTEIN: 5.7 G/DL (ref 6.4–8.3)
TSH SERPL DL<=0.05 MIU/L-ACNC: 0.07 UIU/ML (ref 0.27–4.2)
VITAMIN B-12: 738 PG/ML (ref 211–911)
WBC # BLD: 22.8 K/UL (ref 4–11)

## 2022-04-09 PROCEDURE — 99235 HOSP IP/OBS SAME DATE MOD 70: CPT | Performed by: PHYSICIAN ASSISTANT

## 2022-04-09 PROCEDURE — G0378 HOSPITAL OBSERVATION PER HR: HCPCS

## 2022-04-09 PROCEDURE — 6360000002 HC RX W HCPCS: Performed by: PHYSICIAN ASSISTANT

## 2022-04-09 PROCEDURE — 84443 ASSAY THYROID STIM HORMONE: CPT

## 2022-04-09 PROCEDURE — 6370000000 HC RX 637 (ALT 250 FOR IP): Performed by: PHYSICIAN ASSISTANT

## 2022-04-09 PROCEDURE — 84439 ASSAY OF FREE THYROXINE: CPT

## 2022-04-09 PROCEDURE — 82607 VITAMIN B-12: CPT

## 2022-04-09 PROCEDURE — 94640 AIRWAY INHALATION TREATMENT: CPT

## 2022-04-09 PROCEDURE — 82550 ASSAY OF CK (CPK): CPT

## 2022-04-09 PROCEDURE — 84480 ASSAY TRIIODOTHYRONINE (T3): CPT

## 2022-04-09 PROCEDURE — 80053 COMPREHEN METABOLIC PANEL: CPT

## 2022-04-09 PROCEDURE — 96376 TX/PRO/DX INJ SAME DRUG ADON: CPT

## 2022-04-09 PROCEDURE — 85025 COMPLETE CBC W/AUTO DIFF WBC: CPT

## 2022-04-09 PROCEDURE — 36415 COLL VENOUS BLD VENIPUNCTURE: CPT

## 2022-04-09 PROCEDURE — 82746 ASSAY OF FOLIC ACID SERUM: CPT

## 2022-04-09 PROCEDURE — 96366 THER/PROPH/DIAG IV INF ADDON: CPT

## 2022-04-09 PROCEDURE — 94761 N-INVAS EAR/PLS OXIMETRY MLT: CPT

## 2022-04-09 PROCEDURE — 96367 TX/PROPH/DG ADDL SEQ IV INF: CPT

## 2022-04-09 PROCEDURE — 2580000003 HC RX 258: Performed by: PHYSICIAN ASSISTANT

## 2022-04-09 PROCEDURE — 84145 PROCALCITONIN (PCT): CPT

## 2022-04-09 PROCEDURE — 82306 VITAMIN D 25 HYDROXY: CPT

## 2022-04-09 RX ORDER — NITROGLYCERIN 0.4 MG/1
0.4 TABLET SUBLINGUAL EVERY 5 MIN PRN
Qty: 25 TABLET | Refills: 0 | Status: SHIPPED | OUTPATIENT
Start: 2022-04-09

## 2022-04-09 RX ORDER — CEFDINIR 300 MG/1
300 CAPSULE ORAL 2 TIMES DAILY
Qty: 10 CAPSULE | Refills: 0 | Status: SHIPPED | OUTPATIENT
Start: 2022-04-10 | End: 2022-04-15

## 2022-04-09 RX ORDER — PREDNISONE 10 MG/1
TABLET ORAL
Qty: 12 TABLET | Refills: 0 | Status: SHIPPED | OUTPATIENT
Start: 2022-04-09 | End: 2022-10-02

## 2022-04-09 RX ORDER — LISINOPRIL 30 MG/1
15 TABLET ORAL DAILY
Qty: 30 TABLET | Refills: 3 | Status: SHIPPED
Start: 2022-04-09

## 2022-04-09 RX ORDER — ASPIRIN 81 MG/1
81 TABLET ORAL DAILY
Qty: 30 TABLET | Refills: 0 | Status: SHIPPED | OUTPATIENT
Start: 2022-04-09 | End: 2022-10-02

## 2022-04-09 RX ORDER — BENZONATATE 200 MG/1
200 CAPSULE ORAL 3 TIMES DAILY PRN
Qty: 30 CAPSULE | Refills: 0 | Status: SHIPPED | OUTPATIENT
Start: 2022-04-09 | End: 2022-04-19

## 2022-04-09 RX ORDER — FOLIC ACID 1 MG/1
1 TABLET ORAL DAILY
Qty: 30 TABLET | Refills: 0 | Status: SHIPPED | OUTPATIENT
Start: 2022-04-09

## 2022-04-09 RX ORDER — BUDESONIDE AND FORMOTEROL FUMARATE DIHYDRATE 160; 4.5 UG/1; UG/1
2 AEROSOL RESPIRATORY (INHALATION) 2 TIMES DAILY
Qty: 10.2 G | Refills: 0 | Status: SHIPPED | OUTPATIENT
Start: 2022-04-09

## 2022-04-09 RX ORDER — TIOTROPIUM BROMIDE 18 UG/1
18 CAPSULE ORAL; RESPIRATORY (INHALATION) DAILY
Qty: 30 CAPSULE | Refills: 0 | Status: SHIPPED | OUTPATIENT
Start: 2022-04-09

## 2022-04-09 RX ORDER — AZITHROMYCIN 500 MG/1
500 TABLET, FILM COATED ORAL DAILY
Qty: 1 TABLET | Refills: 0 | Status: SHIPPED | OUTPATIENT
Start: 2022-04-10 | End: 2022-04-11

## 2022-04-09 RX ORDER — GUAIFENESIN 600 MG/1
600 TABLET, EXTENDED RELEASE ORAL 2 TIMES DAILY
Qty: 14 TABLET | Refills: 0 | Status: SHIPPED | OUTPATIENT
Start: 2022-04-09 | End: 2022-04-16

## 2022-04-09 RX ORDER — GUAIFENESIN 600 MG/1
600 TABLET, EXTENDED RELEASE ORAL 2 TIMES DAILY
Status: DISCONTINUED | OUTPATIENT
Start: 2022-04-09 | End: 2022-04-09 | Stop reason: HOSPADM

## 2022-04-09 RX ORDER — GABAPENTIN 300 MG/1
900 CAPSULE ORAL 3 TIMES DAILY
Status: DISCONTINUED | OUTPATIENT
Start: 2022-04-09 | End: 2022-04-09 | Stop reason: HOSPADM

## 2022-04-09 RX ADMIN — BUDESONIDE 500 MCG: 0.5 SUSPENSION RESPIRATORY (INHALATION) at 05:55

## 2022-04-09 RX ADMIN — IPRATROPIUM BROMIDE AND ALBUTEROL SULFATE 1 AMPULE: 2.5; .5 SOLUTION RESPIRATORY (INHALATION) at 08:41

## 2022-04-09 RX ADMIN — BACITRACIN ZINC, NEOMYCIN, POLYMYXIN B: 400; 3.5; 5 OINTMENT TOPICAL at 07:57

## 2022-04-09 RX ADMIN — SODIUM CHLORIDE: 9 INJECTION, SOLUTION INTRAVENOUS at 01:26

## 2022-04-09 RX ADMIN — GUAIFENESIN 600 MG: 600 TABLET, EXTENDED RELEASE ORAL at 14:00

## 2022-04-09 RX ADMIN — IPRATROPIUM BROMIDE AND ALBUTEROL SULFATE 1 AMPULE: 2.5; .5 SOLUTION RESPIRATORY (INHALATION) at 05:55

## 2022-04-09 RX ADMIN — CEFTRIAXONE 1000 MG: 1 INJECTION, POWDER, FOR SOLUTION INTRAMUSCULAR; INTRAVENOUS at 11:46

## 2022-04-09 RX ADMIN — ACETAMINOPHEN 650 MG: 325 TABLET, FILM COATED ORAL at 08:52

## 2022-04-09 RX ADMIN — AZITHROMYCIN MONOHYDRATE 500 MG: 500 INJECTION, POWDER, LYOPHILIZED, FOR SOLUTION INTRAVENOUS at 13:56

## 2022-04-09 RX ADMIN — METHYLPREDNISOLONE SODIUM SUCCINATE 40 MG: 40 INJECTION, POWDER, FOR SOLUTION INTRAMUSCULAR; INTRAVENOUS at 07:55

## 2022-04-09 ASSESSMENT — PAIN DESCRIPTION - PAIN TYPE: TYPE: ACUTE PAIN

## 2022-04-09 ASSESSMENT — PAIN SCALES - GENERAL
PAINLEVEL_OUTOF10: 5
PAINLEVEL_OUTOF10: 0

## 2022-04-09 ASSESSMENT — PAIN DESCRIPTION - LOCATION: LOCATION: HEAD

## 2022-04-09 NOTE — PROGRESS NOTES
Patient d/c'd at this time, IV's removed without incident, patient stable at time of d/c, wheeled out to personal vehicle by PCA. Patient d/c'd with personal belongings.

## 2022-04-09 NOTE — PROGRESS NOTES
Pt d/c teaching completed att. Patient without any questions, patient vitals stable att, patient waiting on IV abx to finish. Ride home contacted and informed that patient should be ready in around 30 minutes for his ride home, patient medications from [place] [id number] given to patient, explained that inhalers would be there on Monday for pickup and about OTC medications that were not covered by his insurance. Return to hospital if symptoms continue or worsen. Discharge instructions given to patient att.

## 2022-04-09 NOTE — H&P
Short Stay Summary      Patient ID: Saba Ennis      Patient's PCP: VY Linn    Admit Date: 4/8/2022     Discharge Date:   4/9/2022    Admitting Physician: Mary Kate Mckeon MD    Discharge Physician: VY Santos     Reason for this admission:   COPD exacerbation   Acute renal failure     Discharge Diagnoses: Active Hospital Problems    Diagnosis Date Noted    Personal history of nicotine dependence [Z87.891] 04/09/2022    Acute renal failure (ARF) ([facility] [N17.9] 04/09/2022    Hypotension [I95.9] 04/09/2022    History of chronic hypertension [Z86.79] 04/09/2022    COPD exacerbation ([facility] [J44.1] 04/08/2022    Anxiety [F41.9] 07/31/2017    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 Consults:   None    HISTORY OF PRESENT ILLNESS:   The patient is a 59 y.o. male with PMH of CAD, chronic pain, Charcot Lakia Tooth disease, nicotine dependence, HTN, and chronic anxiety who presents due to shortness of breath, wheezing, and productive cough for 3 days. Patient denies sick contacts. Denies fever or chills. States he coughed up about a coffee pot full of thick yellow/green sputum over the last few days. Does not use combivent inhaler because it causes headaches but did take his albuterol inhaler without improvement. Hasn't been eating or drinking much over the last few days due to feeling sick. Denies chest pain. Today he is sitting up in bed watching tv. On room air. No distress. Denies wheezing or SOA. Reports he's feeling much better. Asks to be discharged home. States his son who also has CMT disease is at home and he needs to be there to help take care of him.   He is agreeable to follow up with pcp and for follow up lab work as outpatient. Review of system  Constitutional:  Denies fever or chills. Eyes:  Denies change in visual acuity or discharge. HENT:  Denies nasal congestion or sore throat. Respiratory:  Admits to productive cough, wheezing, shortness of breath. Cardiovascular:  Denies chest pain, palpitation or swelling in LEs. GI:  Denies abdominal pain, nausea, vomiting, bloody stools or diarrhea. :  Denies dysuria or frequency. Musculoskeletal:  Positive for chronic back pain and generalized pain in setting of CMT disease. Integument:  Denies rash or itching. Neurologic:  Denies headache, focal weakness or sensory changes. Positive for chronic weakness, gait difficulty, neuropathy, muscle atrophy. Psychiatric:  Denies depression. positive for chronic anxiety. Past Medical History:      Diagnosis Date    CAD (coronary artery disease)     Cancer ([facility]     Charcot-Lakia-Tooth disease     Colon cancer ([facility]     COPD (chronic obstructive pulmonary disease) ([facility]     MI (myocardial infarction) ([facility]     x2    Muscular dystrophies ([facility]        Past Surgical History:      Procedure Laterality Date    ANKLE SURGERY      bilateral    ARM SURGERY Bilateral     Crush injury    BRAIN SURGERY      skull injury, metal palte in head    COLONOSCOPY      patient states colon cancer    FRACTURE SURGERY      JOINT REPLACEMENT      left wrist    TONSILLECTOMY         Social History:   TOBACCO:   reports that he has been smoking cigarettes. He has a 20.00 pack-year smoking history. He has never used smokeless tobacco.  ETOH:   reports no history of alcohol use. OCCUPATION:  None      Family History:       Problem Relation Age of Onset    Other Father         charcot lakia tooth    Heart Attack Father     Cancer Father         unknown    Heart Attack Brother        Allergies:  Patient has no known allergies.     Medications Prior to Admission:    Prior to Admission medications    Medication Sig Start Date End Date Taking? Authorizing Provider   lisinopril (PRINIVIL;ZESTRIL) 30 MG tablet Take 0.5 tablets by mouth daily 4/9/22  Yes VY Cleveland   benzonatate (TESSALON) 200 MG capsule Take 1 capsule by mouth 3 times daily as needed for Cough 4/9/22 4/19/22 Yes VY Cleveland   budesonide-formoterol (SYMBICORT) 160-4.5 MCG/ACT AERO Inhale 2 puffs into the lungs 2 times daily 4/9/22  Yes VY Cleveland   tiotropium (Favian Grant) 18 MCG inhalation capsule Inhale 1 capsule into the lungs daily 4/9/22  Yes VY Cleveland   predniSONE (DELTASONE) 10 MG tablet Take 3 tablets PO daily x 2 days, 2 tablets PO daily x 2 days, 1 tablet PO daily x 2 days, then stop 4/9/22  Yes VY Cleveland   azithromycin (ZITHROMAX) 500 MG tablet Take 1 tablet by mouth daily for 1 day 4/10/22 4/11/22 Yes VY Cleveland   cefdinir (OMNICEF) 300 MG capsule Take 1 capsule by mouth 2 times daily for 5 days 4/10/22 4/15/22 Yes VY Cleveland   latanoprost (XALATAN) 0.005 % ophthalmic solution Place 1 drop into both eyes nightly   Yes Historical Provider, MD   propranolol (INDERAL) 10 MG tablet Take 10 mg by mouth 2 times daily   Yes Historical Provider, MD   albuterol sulfate HFA (VENTOLIN HFA) 108 (90 Base) MCG/ACT inhaler Inhale 2 puffs into the lungs Every 4-6 hours as needed. Yes Historical Provider, MD   gabapentin (NEURONTIN) 300 MG capsule TAKE ONE CAPSULE BY MOUTH THREE TIMES A DAY  Patient taking differently: Take 900 mg by mouth 3 times daily. 3/26/21 4/25/21  PEACE Stokes       Vital Signs  Temp: 98.4 °F (36.9 °C)  Pulse: 65  Resp: 16  BP: 131/76  SpO2: 97 %  O2 Device: None (Room air)       Vital signs reviewed in electronic chart. Physical exam     Constitutional:  Well developed, well nourished, no acute distress. Eyes:  PERRL, conjunctiva normal, EOMI. HENT:  Atraumatic, external ears normal, external nose/nares normal, oropharynx appears dry, no pharyngeal exudates. Neck:  Supple.  No JVD or thyromegaly. Respiratory:  No respiratory distress on room air, decreased breath sounds throughout, no wheezing or rhonchi    Cardiovascular:  Normal rate, normal rhythm, no murmurs, no gallops, no rubs. GI:  Soft, nondistended, normal bowel sounds, nontender, no organomegaly, no mass. Musculoskeletal:  Trace edema in feet (chronic), no tenderness. Muscle atrophy noted. Patient is moving all extremities. Integument:  Well hydrated, no rash. Neurologic:  Alert & oriented x 3,  no focal deficits noted. Strength is decreased but equal throughout. Psychiatric:  Speech and behavior appropriate. Lab Results   Component Value Date    WBC 22.8 (H) 04/09/2022    HGB 11.1 (L) 04/09/2022    HCT 33.3 (L) 04/09/2022    MCV 95.1 04/09/2022     04/09/2022       Lab Results   Component Value Date     04/09/2022    K 4.6 04/09/2022     (H) 04/09/2022    CO2 20 04/09/2022    BUN 38 (H) 04/09/2022    CREATININE 0.8 04/09/2022    GLUCOSE 141 (H) 04/09/2022    CALCIUM 8.2 (L) 04/09/2022    PROT 5.7 (L) 04/09/2022    LABALBU 2.6 (L) 04/09/2022    BILITOT 0.4 04/09/2022    ALKPHOS 52 04/09/2022    AST 32 04/09/2022    ALT 47 (H) 04/09/2022    LABGLOM >59 04/09/2022    GFRAA >59 04/09/2022    AGRATIO 0.8 04/09/2022    GLOB 3.1 04/09/2022          Assessment and Plan/Hospital course: Active Hospital Problems    Diagnosis Date Noted    Personal history of nicotine dependence [Z87.891]  - patient reports 50 year smoking history   - Patient was strongly encouraged to stop smoking. Risks of continued smoking and benefits of smoking cessation were discussed.    - would likely benefit from CT lung screening as outpatient - defer to pcp - did discuss this with patient  04/09/2022    Acute renal failure (ARF) ([facility] [address] [N17.9]  - BUN 88/Cr 2.3 on arrival   - suspect related to dehydration with hypotension, acute illness   - hydrated with IVFs, held home lisinopril, held home propranolol  - CK elevated at 674    - Hydrated with IVFs in ER and continued on NS overnight for 2L total   - Improved today with bun 38/cr 0.8   - will resume lisinopril at decreased dose and resume propranolol as below   - repeat labs as outpatient and f/u with pcp  04/09/2022    Hypotension [I95.9]  History of chronic HTN  - BP 90/62 on arrival and clinically concerned for dehydration with ARF and acute illness as above   - Hydrated with IVFs with improvement  - home lisinopril and propranolol held. neurontin dose renally adjusted. - with improved BP will resume home propranolol and lisinopril at decreased dose  - patient to monitor BP at home and f/u with pcp  04/09/2022    COPD exacerbation ([facility] [J44.1]  - presents due to wheezing, productive cough, SOA   - leukocytosis on arrival with WBC 20   - CXR without acute finding   - clinically concerned for possible underlying pneumonia with purulent sputum   - treated with Rocephin, Zithromax, solumedrol, pulmicort nebs, duonebs, mucinex, tessalon perles   - clinically improved and requesting dc home today. On room air and stable. Will dc home to complete steroid taper and antibiotic course. Patient reports headache with home maintenance inhaler. Will prescribe symbicort + spiriva on dc. Continue albuterol inhaler as needed. - Will need to have follow up labs as outpatient. Suspect slightly worsening leukocytosis today due to steroid use. Will need to follow up with pcp for ongoing monitoring.    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]  - patient with history of CMT 1   - continue neurontin   - reviewed neuro note from march 2022   - f/u with neuro as scheduled 05/07/2017    Chronic pain syndrome [G89.4]  - continue home regimen   - would likely benefit from pain clinic referral as outpatient if not done previously  05/07/2017    Coronary artery disease involving native coronary artery of native heart [I25.10]  - last cardiology note from 2018 per chart review   - no ASA or statin on home med list   - will prescribe ASA and defer starting statin to pcp/cardiology   - also prescribe nitro prn   - patient will need to reestablish with cardiology in future due to history of CAD - defer to patient and pcp. Patient planning to move to [place] in next 2 months so states he is waiting to establish care there. 05/07/2017   TSH suppressed at 0.07. Free T4 normal at 1.18 and T3 pending. Will need to repeat thyroid studies with pcp as outpatient. Of note, I called [facility] and they usually deliver patient's meds to him at home. They will deliver meds he needs over the weekend to the hospital prior to his discharge. Disposition: home  Discharged Condition: Stable  Activity: activity as tolerated  Diet: regular diet  Follow Up: Primary Care Physician in one week. Follow up with neurology and cardiology as scheduled. Patient to proceed with the following labs (CBC, BMP) on 4/20  Due to low blood pressure your lisinopril was changed to half tablet (15 mg) daily. Check your blood pressure daily and if greater than 140 can restart full tablet 30 mg daily.       Discharge Medications:      Current Discharge Medication List           Details   benzonatate (TESSALON) 200 MG capsule Take 1 capsule by mouth 3 times daily as needed for Cough  Qty: 30 capsule, Refills: 0      budesonide-formoterol (SYMBICORT) 160-4.5 MCG/ACT AERO Inhale 2 puffs into the lungs 2 times daily  Qty: 10.2 g, Refills: 0      tiotropium (SPIRIVA HANDIHALER) 18 MCG inhalation capsule Inhale 1 capsule into the lungs daily  Qty: 30 capsule, Refills: 0      predniSONE (DELTASONE) 10 MG tablet Take 3 tablets PO daily x 2 days, 2 tablets PO daily x 2 days, 1 tablet PO daily x 2 days, then stop  Qty: 12 tablet, Refills: 0      azithromycin (ZITHROMAX) 500 MG tablet Take 1 tablet by mouth daily for 1 day  Qty: 1 tablet, Refills: 0    Associated Diagnoses: COPD exacerbation (HCC)      cefdinir (OMNICEF) 300 MG capsule Take 1 capsule by mouth 2 times daily for 5 days  Qty: 10 capsule, Refills: 0      aspirin EC 81 MG EC tablet Take 1 tablet by mouth daily  Qty: 30 tablet, Refills: 0      folic acid (FOLVITE) 1 MG tablet Take 1 tablet by mouth daily  Qty: 30 tablet, Refills: 0      guaiFENesin (MUCINEX) 600 MG extended release tablet Take 1 tablet by mouth 2 times daily for 7 days  Qty: 14 tablet, Refills: 0      nitroGLYCERIN (NITROSTAT) 0.4 MG SL tablet Place 1 tablet under the tongue every 5 minutes as needed for Chest pain up to max of 3 total doses. If no relief after 1 dose, call 911. Qty: 25 tablet, Refills: 0              Details   lisinopril (PRINIVIL;ZESTRIL) 30 MG tablet Take 0.5 tablets by mouth daily  Qty: 30 tablet, Refills: 3              Details   latanoprost (XALATAN) 0.005 % ophthalmic solution Place 1 drop into both eyes nightly      propranolol (INDERAL) 10 MG tablet Take 10 mg by mouth 2 times daily      albuterol sulfate HFA (VENTOLIN HFA) 108 (90 Base) MCG/ACT inhaler Inhale 2 puffs into the lungs Every 4-6 hours as needed. gabapentin (NEURONTIN) 300 MG capsule TAKE ONE CAPSULE BY MOUTH THREE TIMES A DAY  Qty: 90 capsule, Refills: 0    Associated Diagnoses: Charcot-Lakia-Tooth atrophy              The case was discussed with Dr. Asha Sebastian by phone and plan of care reviewed    Signed:  Electronically signed by VY Adams on 4/9/2022 at 11:47 AM       Thank you VY Torres for the opportunity to be involved in this patient's care. If you have any questions or concerns please feel free to contact me at [phone].

## 2022-04-09 NOTE — PLAN OF CARE
Problem: Pain:  Goal: Patient's pain/discomfort is manageable  Description: Patient's pain/discomfort is manageable  4/8/2022 1548 by Vipul Sparrow RN  Outcome: Met This Shift     Problem: Falls - Risk of:  Goal: Will remain free from falls  Description: Will remain free from falls  4/8/2022 1544 by Vipul Sparrow RN  Outcome: Ongoing  Goal: Absence of physical injury  Description: Absence of physical injury  4/8/2022 1544 by Vipul Sparrow RN  Outcome: Ongoing     Problem: Infection:  Goal: Will remain free from infection  Description: Will remain free from infection  4/8/2022 1548 by Vipul Sparrow RN  Outcome: Ongoing     Problem: Daily Care:  Goal: Daily care needs are met  Description: Daily care needs are met  4/9/2022 0154 by Jesse Reed RN  Outcome: Ongoing  4/8/2022 1548 by Vipul Sparrow RN  Outcome: Met This Shift     Problem: Skin Integrity:  Goal: Skin integrity will stabilize  Description: Skin integrity will stabilize  4/8/2022 1548 by Vipul Sparrow RN  Outcome: Ongoing     Problem: Discharge Planning:  Goal: Patients continuum of care needs are met  Description: Patients continuum of care needs are met  4/9/2022 0154 by Jesse Reed RN  Outcome: Ongoing  4/8/2022 1548 by Vipul Sparrow RN  Outcome: Ongoing
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
## 2022-04-10 LAB
CULTURE, RESPIRATORY: ABNORMAL
CULTURE, RESPIRATORY: ABNORMAL
GRAM STAIN RESULT: ABNORMAL
ORGANISM: ABNORMAL

## 2022-04-11 LAB — VITAMIN D 25-HYDROXY: 13.5 (ref 32–100)

## 2022-04-12 LAB
BLOOD CULTURE, ROUTINE: NORMAL
CULTURE, BLOOD 2: NORMAL

## 2022-05-10 ENCOUNTER — TELEPHONE (OUTPATIENT)
Dept: SURGERY | Facility: CLINIC | Age: 57
End: 2022-05-10

## 2022-05-10 NOTE — TELEPHONE ENCOUNTER
PT IS READY TO S/C COLONOSCOPY. PT WAS ON A 3 YEAR COLON RECALL LIST / LAST COLON 09/12/2017.   I HAVE VERIFIED PT DEMOGRAPHIC AND INSURANCE.

## 2022-06-12 ENCOUNTER — HOSPITAL ENCOUNTER (EMERGENCY)
Facility: HOSPITAL | Age: 57
Discharge: HOME | End: 2022-06-12
Attending: EMERGENCY MEDICINE
Payer: MEDICARE

## 2022-06-12 VITALS
TEMPERATURE: 97.8 F | DIASTOLIC BLOOD PRESSURE: 103 MMHG | WEIGHT: 184 LBS | BODY MASS INDEX: 25.76 KG/M2 | SYSTOLIC BLOOD PRESSURE: 156 MMHG | HEART RATE: 81 BPM | OXYGEN SATURATION: 97 % | RESPIRATION RATE: 22 BRPM | HEIGHT: 71 IN

## 2022-06-12 DIAGNOSIS — T23.201A PARTIAL THICKNESS BURN OF MULTIPLE SITES OF RIGHT HAND, INITIAL ENCOUNTER: Primary | ICD-10-CM

## 2022-06-12 PROCEDURE — 90715 TDAP VACCINE 7 YRS/> IM: CPT | Performed by: EMERGENCY MEDICINE

## 2022-06-12 PROCEDURE — 90471 IMMUNIZATION ADMIN: CPT | Performed by: EMERGENCY MEDICINE

## 2022-06-12 PROCEDURE — 2500000003 HC RX 250 WO HCPCS: Performed by: EMERGENCY MEDICINE

## 2022-06-12 PROCEDURE — 6370000000 HC RX 637 (ALT 250 FOR IP): Performed by: EMERGENCY MEDICINE

## 2022-06-12 PROCEDURE — 6360000002 HC RX W HCPCS: Performed by: EMERGENCY MEDICINE

## 2022-06-12 PROCEDURE — 6360000002 HC RX W HCPCS

## 2022-06-12 PROCEDURE — 99284 EMERGENCY DEPT VISIT MOD MDM: CPT

## 2022-06-12 PROCEDURE — 96375 TX/PRO/DX INJ NEW DRUG ADDON: CPT

## 2022-06-12 PROCEDURE — 96374 THER/PROPH/DIAG INJ IV PUSH: CPT

## 2022-06-12 RX ORDER — OXYCODONE AND ACETAMINOPHEN 7.5; 325 MG/1; MG/1
1 TABLET ORAL EVERY 6 HOURS PRN
Qty: 30 TABLET | Refills: 0 | Status: SHIPPED | OUTPATIENT
Start: 2022-06-12 | End: 2022-06-20

## 2022-06-12 RX ORDER — ONDANSETRON 2 MG/ML
4 INJECTION INTRAMUSCULAR; INTRAVENOUS ONCE
Status: COMPLETED | OUTPATIENT
Start: 2022-06-12 | End: 2022-06-12

## 2022-06-12 RX ORDER — MORPHINE SULFATE 4 MG/ML
4 INJECTION, SOLUTION INTRAMUSCULAR; INTRAVENOUS ONCE
Status: COMPLETED | OUTPATIENT
Start: 2022-06-12 | End: 2022-06-12

## 2022-06-12 RX ORDER — OXYCODONE AND ACETAMINOPHEN 10; 325 MG/1; MG/1
1 TABLET ORAL ONCE
Status: COMPLETED | OUTPATIENT
Start: 2022-06-12 | End: 2022-06-12

## 2022-06-12 RX ORDER — ONDANSETRON 2 MG/ML
INJECTION INTRAMUSCULAR; INTRAVENOUS
Status: COMPLETED
Start: 2022-06-12 | End: 2022-06-12

## 2022-06-12 RX ADMIN — SILVER SULFADIAZINE: 10 CREAM TOPICAL at 22:39

## 2022-06-12 RX ADMIN — MORPHINE SULFATE 4 MG: 4 INJECTION, SOLUTION INTRAMUSCULAR; INTRAVENOUS at 22:00

## 2022-06-12 RX ADMIN — OXYCODONE HYDROCHLORIDE AND ACETAMINOPHEN 1 TABLET: 10; 325 TABLET ORAL at 22:52

## 2022-06-12 RX ADMIN — TETANUS TOXOID, REDUCED DIPHTHERIA TOXOID AND ACELLULAR PERTUSSIS VACCINE, ADSORBED 0.5 ML: 5; 2.5; 8; 8; 2.5 SUSPENSION INTRAMUSCULAR at 22:37

## 2022-06-12 RX ADMIN — ONDANSETRON 4 MG: 2 INJECTION INTRAMUSCULAR; INTRAVENOUS at 22:00

## 2022-06-12 ASSESSMENT — PAIN SCALES - GENERAL: PAINLEVEL_OUTOF10: 5
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
## 2022-07-01 ENCOUNTER — APPOINTMENT (OUTPATIENT)
Dept: GENERAL RADIOLOGY | Facility: HOSPITAL | Age: 57
End: 2022-07-01
Payer: MEDICARE

## 2022-07-01 ENCOUNTER — HOSPITAL ENCOUNTER (EMERGENCY)
Facility: HOSPITAL | Age: 57
Discharge: HOME | End: 2022-07-02
Attending: FAMILY MEDICINE
Payer: MEDICARE

## 2022-07-01 VITALS
RESPIRATION RATE: 18 BRPM | HEIGHT: 71 IN | DIASTOLIC BLOOD PRESSURE: 98 MMHG | SYSTOLIC BLOOD PRESSURE: 138 MMHG | WEIGHT: 174 LBS | HEART RATE: 98 BPM | OXYGEN SATURATION: 95 % | BODY MASS INDEX: 24.36 KG/M2 | TEMPERATURE: 98 F

## 2022-07-01 DIAGNOSIS — S93.602A FOOT SPRAIN, LEFT, INITIAL ENCOUNTER: Primary | ICD-10-CM

## 2022-07-01 PROCEDURE — 73630 X-RAY EXAM OF FOOT: CPT

## 2022-07-01 PROCEDURE — 6370000000 HC RX 637 (ALT 250 FOR IP): Performed by: FAMILY MEDICINE

## 2022-07-01 PROCEDURE — 99284 EMERGENCY DEPT VISIT MOD MDM: CPT

## 2022-07-01 RX ORDER — HYDROCODONE BITARTRATE AND ACETAMINOPHEN 5; 325 MG/1; MG/1
1 TABLET ORAL EVERY 6 HOURS PRN
Qty: 8 TABLET | Refills: 0 | Status: SHIPPED | OUTPATIENT
Start: 2022-07-01 | End: 2022-07-03

## 2022-07-01 RX ORDER — TRAMADOL HYDROCHLORIDE 50 MG/1
50 TABLET ORAL ONCE
Status: COMPLETED | OUTPATIENT
Start: 2022-07-01 | End: 2022-07-01

## 2022-07-01 RX ORDER — NAPROXEN 500 MG/1
500 TABLET ORAL ONCE
Status: COMPLETED | OUTPATIENT
Start: 2022-07-01 | End: 2022-07-01

## 2022-07-01 RX ORDER — OXYCODONE AND ACETAMINOPHEN 10; 325 MG/1; MG/1
1 TABLET ORAL ONCE
Status: COMPLETED | OUTPATIENT
Start: 2022-07-01 | End: 2022-07-01

## 2022-07-01 RX ADMIN — TRAMADOL HYDROCHLORIDE 50 MG: 50 TABLET ORAL at 23:45

## 2022-07-01 RX ADMIN — OXYCODONE HYDROCHLORIDE AND ACETAMINOPHEN 1 TABLET: 10; 325 TABLET ORAL at 23:06

## 2022-07-01 RX ADMIN — NAPROXEN 500 MG: 500 TABLET ORAL at 23:06

## 2022-07-01 ASSESSMENT — PAIN DESCRIPTION - ORIENTATION: ORIENTATION: LEFT

## 2022-07-01 ASSESSMENT — PAIN DESCRIPTION - FREQUENCY: FREQUENCY: CONTINUOUS

## 2022-07-01 ASSESSMENT — PAIN SCALES - GENERAL
PAINLEVEL_OUTOF10: 10
PAINLEVEL_OUTOF10: 10

## 2022-07-01 ASSESSMENT — PAIN DESCRIPTION - ONSET: ONSET: ON-GOING

## 2022-07-01 ASSESSMENT — PAIN DESCRIPTION - LOCATION
LOCATION: FOOT;TOE (COMMENT WHICH ONE)

## 2022-07-01 ASSESSMENT — PAIN - FUNCTIONAL ASSESSMENT: PAIN_FUNCTIONAL_ASSESSMENT: 0-10

## 2022-07-01 ASSESSMENT — PAIN DESCRIPTION - DESCRIPTORS: DESCRIPTORS: SHOOTING;SHARP

## 2022-07-01 ASSESSMENT — ENCOUNTER SYMPTOMS: COLOR CHANGE: 1

## 2022-07-02 NOTE — ED PROVIDER NOTES
[address] [place] [address] Court  eMERGENCY dEPARTMENT eNCOUnter      Pt Name: Verena Martin  MRN: [id number]  Deandra 1965  Date of evaluation: 7/1/2022  Provider: Dalia Cuadra DO    CHIEF COMPLAINT       Chief Complaint   Patient presents with    Toe Injury     jumped out of truck          HISTORY OF PRESENT ILLNESS   (Location/Symptom, Timing/Onset, Context/Setting, Quality, Duration, Modifying Factors, Severity)  Note limiting factors. Verena Martin is a 59 y.o. male who presents to the emergency department for having left foot pain. Pt states that he didn't have shoes on and only his braces, his son was having a seizure and he jumped out of vehicle and stumped his toes over the burm concrete stopper trying to get to his son. He said that all his toes got curled under him. Some bruising to left great toe. Some swelling to 2-3rd toes. Happened less than an hour prior to arrival. No ankle pain, no knee or hip pain. Nursing Notes were reviewed. REVIEW OF SYSTEMS    (2-9 systems for level 4, 10 or more forlevel 5)     Review of Systems   Musculoskeletal: Positive for arthralgias and gait problem. Pain to left foot and toes, swelling to left 2-3rd toes along with minor ecchymosis to medial dorsal great toe   Skin: Positive for color change. All other systems reviewed and are negative.           PAST MEDICAL HISTORY     Past Medical History:   Diagnosis Date    CAD (coronary artery disease)     [facility]     Charcot-Lakia-Tooth disease     Colon cancer ([facility]     COPD (chronic obstructive pulmonary disease) ([address]     MI (myocardial infarction) ([address]     x2    Muscular dystrophies ([facility]          SURGICAL HISTORY       Past Surgical History:   Procedure Laterality Date    ANKLE SURGERY      bilateral    ARM SURGERY Bilateral     Crush injury    BRAIN SURGERY      skull injury, metal palte in head    COLONOSCOPY      patient states colon cancer    FRACTURE SURGERY      JOINT REPLACEMENT      left wrist    TONSILLECTOMY           CURRENT MEDICATIONS       Previous Medications    ALBUTEROL SULFATE HFA (VENTOLIN HFA) 108 (90 BASE) MCG/ACT INHALER    Inhale 2 puffs into the lungs Every 4-6 hours as needed. ASPIRIN EC 81 MG EC TABLET    Take 1 tablet by mouth daily    BUDESONIDE-FORMOTEROL (SYMBICORT) 160-4.5 MCG/ACT AERO    Inhale 2 puffs into the lungs 2 times daily    FOLIC ACID (FOLVITE) 1 MG TABLET    Take 1 tablet by mouth daily    GABAPENTIN (NEURONTIN) 300 MG CAPSULE    TAKE ONE CAPSULE BY MOUTH THREE TIMES A DAY    LATANOPROST (XALATAN) 0.005 % OPHTHALMIC SOLUTION    Place 1 drop into both eyes nightly    LISINOPRIL (PRINIVIL;ZESTRIL) 30 MG TABLET    Take 0.5 tablets by mouth daily    NITROGLYCERIN (NITROSTAT) 0.4 MG SL TABLET    Place 1 tablet under the tongue every 5 minutes as needed for Chest pain up to max of 3 total doses. If no relief after 1 dose, call 911. PREDNISONE (DELTASONE) 10 MG TABLET    Take 3 tablets PO daily x 2 days, 2 tablets PO daily x 2 days, 1 tablet PO daily x 2 days, then stop    PROPRANOLOL (INDERAL) 10 MG TABLET    Take 10 mg by mouth 2 times daily    TIOTROPIUM (SPIRIVA HANDIHALER) 18 MCG INHALATION CAPSULE    Inhale 1 capsule into the lungs daily       ALLERGIES     Patient has no known allergies.     FAMILY HISTORY       Family History   Problem Relation Age of Onset    Other Father         charcot preston tooth    Heart Attack Father     Cancer Father         unknown    Heart Attack Brother           SOCIAL HISTORY       Social History     Socioeconomic History    Marital status:      Spouse name: None    Number of children: None    Years of education: None    Highest education level: None   Occupational History    None   Tobacco Use    Smoking status: Current Every Day Smoker     Packs/day: 0.50     Years: 40.00     Pack years: 20.00     Types: Cigarettes    Smokeless tobacco: Never Used   Vaping Use    Vaping Use: Never used   Substance and Sexual Activity    Alcohol use: No    Drug use: No    Sexual activity: Yes     Partners: Female   Other Topics Concern    None   Social History Narrative    None     Social Determinants of Health     Financial Resource Strain:     Difficulty of Paying Living Expenses: Not on file   Food Insecurity:     Worried About Running Out of Food in the Last Year: Not on file    Tammy of Food in the Last Year: Not on file   Transportation Needs:     Lack of Transportation (Medical): Not on file    Lack of Transportation (Non-Medical): Not on file   Physical Activity:     Days of Exercise per Week: Not on file    Minutes of Exercise per Session: Not on file   Stress:     Feeling of Stress : Not on file   Social Connections:     Frequency of Communication with Friends and Family: Not on file    Frequency of Social Gatherings with Friends and Family: Not on file    Attends [employer] Services: Not on file    Active Member of [address] [place] [address] or Organizations: Not on file    Attends Club or Organization Meetings: Not on file    Marital Status: Not on file   Intimate Partner Violence:     Fear of Current or Ex-Partner: Not on file    Emotionally Abused: Not on file    Physically Abused: Not on file    Sexually Abused: Not on file   Housing Stability:     Unable to Pay for Housing in the Last Year: Not on file    Number of [place] in the Last Year: Not on file    Unstable Housing in the Last Year: Not on file       SCREENINGS    Shasha Coma Scale  Eye Opening: Spontaneous  Best Verbal Response: Oriented  Best Motor Response: Obeys commands  Shasha Coma Scale Score: 15        PHYSICAL EXAM    (up to 7 for level 4, 8 or more for level 5)     ED Triage Vitals [07/01/22 2217]   BP Temp Temp Source Heart Rate Resp SpO2 Height Weight   (!) 138/98 98 °F (36.7 °C) Oral 98 18 95 % 5' 11\" (1.803 m) 174 lb (78.9 kg)       Physical Exam  Vitals and nursing note reviewed. Constitutional:       General: He is not in acute distress. Appearance: Normal appearance. HENT:      Head: Normocephalic. Eyes:      Extraocular Movements: Extraocular movements intact. Pupils: Pupils are equal, round, and reactive to light. Cardiovascular:      Rate and Rhythm: Normal rate and regular rhythm. Heart sounds: Normal heart sounds. Pulmonary:      Effort: Pulmonary effort is normal.      Breath sounds: Normal breath sounds. Musculoskeletal:         General: Tenderness present. No deformity. Cervical back: Neck supple. Comments: Tenderness to left great toe, 2-3-4rth toes to palpation. Slight edema to 2-3 rd toes. Minor ecchymosis left medal great toe. Limited ROM due to pain. Unable to ambulate on left foot. Skin:     General: Skin is warm and dry. Neurological:      Mental Status: He is alert and oriented to person, place, and time. Psychiatric:         Mood and Affect: Mood normal.         Behavior: Behavior normal.         DIAGNOSTIC RESULTS     EKG: All EKG's are interpreted by the Emergency Department Physician who either signs or Co-signs this chart in the absence of a cardiologist.    None    RADIOLOGY:   Non-plain film images such as CT, Ultrasound and MRI are read by the radiologist. Plain radiographic images are visualized andpreliminarily interpreted by the emergency physician with the below findings:    Left Foot - See Below    Interpretationper the Radiologist below, if available at the time of this note:    XR FOOT LEFT (MIN 3 VIEWS)   Final Result      No acute osseous abnormality. ED BEDSIDE ULTRASOUND:   Performed by ED Physician - none    LABS:  Labs Reviewed - No data to display    All other labs were within normal range or not returned as of this dictation.     EMERGENCY DEPARTMENT COURSE and DIFFERENTIAL DIAGNOSIS/MDM:   Vitals:    Vitals:    07/01/22 2217   BP: (!) 138/98   Pulse: 98   Resp: 18   Temp: 98 °F (36.7 °C) TempSrc: Oral   SpO2: 95%   Weight: 174 lb (78.9 kg)   Height: 5' 11\" (1.803 m)           CRITICAL CARE TIME   Total Critical Care time was 0 minutes, excluding separatelyreportable procedures. There was a high probability ofclinically significant/life threatening deterioration in the patient's condition which required my urgent intervention. CONSULTS:  None    PROCEDURES:  I applied ACE wrap to left foot; Placed by me for compression and to keep swelling down. Used 2\" and a 3\" ACE to cover foot/toes/support ankle/foot joint; Neurovascularly intact left foot. Tolerated well. PROGRESS NOTES:    Pt with injury and hyperflexion of his toes on left foot by stubbing it on concrete car stopper/burm, pt with some bruising and swelling to toes. Ordered pain meds. Pt with negative xray for fracture. Most likely sprained the toes of the foot from hyperflexion    FINAL IMPRESSION      1. Foot sprain, left, initial encounter New Problem         DISPOSITION/PLAN   DISPOSITION        PATIENT REFERRED TO:    Follow up with primary care in next 1 week if symptoms of pain or swelling to toes continue. Pain meds sent to pharmacy          DISCHARGE MEDICATIONS:  New Prescriptions    HYDROCODONE-ACETAMINOPHEN (NORCO) 5-325 MG PER TABLET    Take 1 tablet by mouth every 6 hours as needed for Pain for up to 2 days. Intended supply: 3 days.  Take lowest dose possible to manage pain       (Please note that portions of this note were completed with a voice recognition program.  Efforts were made to edit the dictations but occasionallywords are mis-transcribed.)    Wanda Davis DO (electronically signed)  Attending Emergency Physician          Wanda Davis DO  07/01/22 2675

## 2022-07-02 NOTE — ED TRIAGE NOTES
Pt was [address] of apartment complex to unload groceries; son was having a seizure and pt states he jumped out of his truck and hit his foot on concrete.

## 2022-07-07 ENCOUNTER — APPOINTMENT (OUTPATIENT)
Dept: GENERAL RADIOLOGY | Facility: HOSPITAL | Age: 57
End: 2022-07-07
Payer: MEDICARE

## 2022-07-07 ENCOUNTER — HOSPITAL ENCOUNTER (OUTPATIENT)
Facility: HOSPITAL | Age: 57
Setting detail: OBSERVATION
Discharge: HOME | End: 2022-07-08
Attending: EMERGENCY MEDICINE | Admitting: INTERNAL MEDICINE
Payer: MEDICARE

## 2022-07-07 ENCOUNTER — APPOINTMENT (OUTPATIENT)
Dept: CT IMAGING | Facility: HOSPITAL | Age: 57
End: 2022-07-07
Payer: MEDICARE

## 2022-07-07 DIAGNOSIS — T50.901A ACCIDENTAL OVERDOSE, INITIAL ENCOUNTER: Primary | ICD-10-CM

## 2022-07-07 PROBLEM — G93.40 ACUTE ENCEPHALOPATHY: Status: ACTIVE | Noted: 2022-07-07

## 2022-07-07 LAB
A/G RATIO: 1.3 (ref 0.8–2)
ALBUMIN SERPL-MCNC: 4.2 G/DL (ref 3.4–4.8)
ALP BLD-CCNC: 70 U/L (ref 25–100)
ALT SERPL-CCNC: 51 U/L (ref 4–36)
AMPHETAMINE SCREEN, URINE: POSITIVE
ANION GAP SERPL CALCULATED.3IONS-SCNC: 16 MMOL/L (ref 3–16)
AST SERPL-CCNC: 67 U/L (ref 8–33)
BARBITURATE SCREEN URINE: ABNORMAL
BASOPHILS ABSOLUTE: 0 K/UL (ref 0–0.1)
BASOPHILS RELATIVE PERCENT: 0.2 %
BENZODIAZEPINE SCREEN, URINE: POSITIVE
BILIRUB SERPL-MCNC: 2 MG/DL (ref 0.3–1.2)
BILIRUBIN URINE: ABNORMAL
BLOOD, URINE: ABNORMAL
BUN BLDV-MCNC: 39 MG/DL (ref 6–20)
BUPRENORPHINE QUAL, URINE: ABNORMAL
CALCIUM SERPL-MCNC: 9 MG/DL (ref 8.5–10.5)
CANNABINOID SCREEN URINE: ABNORMAL
CHLORIDE BLD-SCNC: 98 MMOL/L (ref 98–107)
CHP ED QC CHECK: NORMAL
CLARITY: CLEAR
CO2: 20 MMOL/L (ref 20–30)
COCAINE METABOLITE SCREEN URINE: ABNORMAL
COLOR: YELLOW
CREAT SERPL-MCNC: 1.8 MG/DL (ref 0.4–1.2)
EOSINOPHILS ABSOLUTE: 0 K/UL (ref 0–0.4)
EOSINOPHILS RELATIVE PERCENT: 0.1 %
EPITHELIAL CELLS, UA: ABNORMAL /HPF (ref 0–5)
GFR AFRICAN AMERICAN: 47
GFR NON-AFRICAN AMERICAN: 39
GLOBULIN: 3.2 G/DL
GLUCOSE BLD-MCNC: 107 MG/DL (ref 74–106)
GLUCOSE BLD-MCNC: 90 MG/DL
GLUCOSE BLD-MCNC: 90 MG/DL (ref 74–106)
GLUCOSE URINE: NEGATIVE MG/DL
HCT VFR BLD CALC: 37.1 % (ref 40–54)
HEMOGLOBIN: 12.7 G/DL (ref 13–18)
HYALINE CASTS: ABNORMAL /LPF (ref 0–2)
IMMATURE GRANULOCYTES #: 0.1 K/UL
IMMATURE GRANULOCYTES %: 0.4 % (ref 0–5)
KETONES, URINE: 15 MG/DL
LEUKOCYTE ESTERASE, URINE: NEGATIVE
LYMPHOCYTES ABSOLUTE: 3.3 K/UL (ref 1.5–4)
LYMPHOCYTES RELATIVE PERCENT: 23.8 %
Lab: ABNORMAL
MAGNESIUM: 2.6 MG/DL (ref 1.7–2.4)
MCH RBC QN AUTO: 31.7 PG (ref 27–32)
MCHC RBC AUTO-ENTMCNC: 34.2 G/DL (ref 31–35)
MCV RBC AUTO: 92.5 FL (ref 80–100)
METHADONE SCREEN, URINE: ABNORMAL
METHAMPHETAMINE, URINE: POSITIVE
MICROSCOPIC EXAMINATION: YES
MONOCYTES ABSOLUTE: 2.2 K/UL (ref 0.2–0.8)
MONOCYTES RELATIVE PERCENT: 16 %
NEUTROPHILS ABSOLUTE: 8.2 K/UL (ref 2–7.5)
NEUTROPHILS RELATIVE PERCENT: 59.5 %
NITRITE, URINE: NEGATIVE
OPIATE SCREEN URINE: ABNORMAL
PDW BLD-RTO: 12.8 % (ref 11–16)
PERFORMED ON: NORMAL
PH UA: 5.5 (ref 5–8)
PHENCYCLIDINE SCREEN URINE: ABNORMAL
PLATELET # BLD: 284 K/UL (ref 150–400)
PMV BLD AUTO: 10.6 FL (ref 6–10)
POTASSIUM REFLEX MAGNESIUM: 3.5 MMOL/L (ref 3.4–5.1)
PROPOXYPHENE SCREEN, URINE: ABNORMAL
PROTEIN UA: 30 MG/DL
RBC # BLD: 4.01 M/UL (ref 4.5–6)
RBC UA: ABNORMAL /HPF (ref 0–4)
SARS-COV-2, NAAT: NOT DETECTED
SODIUM BLD-SCNC: 134 MMOL/L (ref 136–145)
SPECIFIC GRAVITY UA: >=1.03 (ref 1–1.03)
TOTAL PROTEIN: 7.4 G/DL (ref 6.4–8.3)
TRICYCLIC, URINE: ABNORMAL
UR OXYCODONE RAPID SCREEN: ABNORMAL
URINE REFLEX TO CULTURE: ABNORMAL
URINE TYPE: ABNORMAL
UROBILINOGEN, URINE: 2 E.U./DL
WBC # BLD: 13.8 K/UL (ref 4–11)
WBC UA: ABNORMAL /HPF (ref 0–5)

## 2022-07-07 PROCEDURE — 96372 THER/PROPH/DIAG INJ SC/IM: CPT

## 2022-07-07 PROCEDURE — 2580000003 HC RX 258: Performed by: EMERGENCY MEDICINE

## 2022-07-07 PROCEDURE — 73620 X-RAY EXAM OF FOOT: CPT

## 2022-07-07 PROCEDURE — 6360000002 HC RX W HCPCS: Performed by: PHYSICIAN ASSISTANT

## 2022-07-07 PROCEDURE — 96361 HYDRATE IV INFUSION ADD-ON: CPT

## 2022-07-07 PROCEDURE — 83735 ASSAY OF MAGNESIUM: CPT

## 2022-07-07 PROCEDURE — 36415 COLL VENOUS BLD VENIPUNCTURE: CPT

## 2022-07-07 PROCEDURE — 6360000002 HC RX W HCPCS: Performed by: EMERGENCY MEDICINE

## 2022-07-07 PROCEDURE — 73600 X-RAY EXAM OF ANKLE: CPT

## 2022-07-07 PROCEDURE — 72170 X-RAY EXAM OF PELVIS: CPT

## 2022-07-07 PROCEDURE — G0378 HOSPITAL OBSERVATION PER HR: HCPCS

## 2022-07-07 PROCEDURE — 81001 URINALYSIS AUTO W/SCOPE: CPT

## 2022-07-07 PROCEDURE — 70450 CT HEAD/BRAIN W/O DYE: CPT

## 2022-07-07 PROCEDURE — 87635 SARS-COV-2 COVID-19 AMP PRB: CPT

## 2022-07-07 PROCEDURE — 96374 THER/PROPH/DIAG INJ IV PUSH: CPT

## 2022-07-07 PROCEDURE — 72125 CT NECK SPINE W/O DYE: CPT

## 2022-07-07 PROCEDURE — 85025 COMPLETE CBC W/AUTO DIFF WBC: CPT

## 2022-07-07 PROCEDURE — 80307 DRUG TEST PRSMV CHEM ANLYZR: CPT

## 2022-07-07 PROCEDURE — 73610 X-RAY EXAM OF ANKLE: CPT | Performed by: RADIOLOGY

## 2022-07-07 PROCEDURE — 71045 X-RAY EXAM CHEST 1 VIEW: CPT

## 2022-07-07 PROCEDURE — 99285 EMERGENCY DEPT VISIT HI MDM: CPT

## 2022-07-07 PROCEDURE — 6370000000 HC RX 637 (ALT 250 FOR IP): Performed by: PHYSICIAN ASSISTANT

## 2022-07-07 PROCEDURE — 73630 X-RAY EXAM OF FOOT: CPT

## 2022-07-07 PROCEDURE — 51702 INSERT TEMP BLADDER CATH: CPT

## 2022-07-07 PROCEDURE — 80053 COMPREHEN METABOLIC PANEL: CPT

## 2022-07-07 RX ORDER — ASPIRIN 81 MG/1
81 TABLET ORAL DAILY
Status: DISCONTINUED | OUTPATIENT
Start: 2022-07-07 | End: 2022-07-08 | Stop reason: HOSPADM

## 2022-07-07 RX ORDER — BUDESONIDE AND FORMOTEROL FUMARATE DIHYDRATE 160; 4.5 UG/1; UG/1
2 AEROSOL RESPIRATORY (INHALATION) 2 TIMES DAILY
Status: DISCONTINUED | OUTPATIENT
Start: 2022-07-07 | End: 2022-07-08 | Stop reason: HOSPADM

## 2022-07-07 RX ORDER — MORPHINE SULFATE 2 MG/ML
2 INJECTION, SOLUTION INTRAMUSCULAR; INTRAVENOUS ONCE
Status: DISCONTINUED | OUTPATIENT
Start: 2022-07-07 | End: 2022-07-07

## 2022-07-07 RX ORDER — POLYETHYLENE GLYCOL 3350 17 G/17G
17 POWDER, FOR SOLUTION ORAL DAILY PRN
Status: DISCONTINUED | OUTPATIENT
Start: 2022-07-07 | End: 2022-07-08 | Stop reason: HOSPADM

## 2022-07-07 RX ORDER — FOLIC ACID 1 MG/1
1 TABLET ORAL DAILY
Status: DISCONTINUED | OUTPATIENT
Start: 2022-07-07 | End: 2022-07-08 | Stop reason: HOSPADM

## 2022-07-07 RX ORDER — PROPRANOLOL HYDROCHLORIDE 10 MG/1
10 TABLET ORAL 2 TIMES DAILY
Status: DISCONTINUED | OUTPATIENT
Start: 2022-07-07 | End: 2022-07-08 | Stop reason: HOSPADM

## 2022-07-07 RX ORDER — ACETAMINOPHEN 650 MG/1
650 SUPPOSITORY RECTAL EVERY 6 HOURS PRN
Status: DISCONTINUED | OUTPATIENT
Start: 2022-07-07 | End: 2022-07-08 | Stop reason: HOSPADM

## 2022-07-07 RX ORDER — SODIUM CHLORIDE, SODIUM LACTATE, POTASSIUM CHLORIDE, AND CALCIUM CHLORIDE .6; .31; .03; .02 G/100ML; G/100ML; G/100ML; G/100ML
1000 INJECTION, SOLUTION INTRAVENOUS ONCE
Status: COMPLETED | OUTPATIENT
Start: 2022-07-07 | End: 2022-07-07

## 2022-07-07 RX ORDER — ONDANSETRON 2 MG/ML
4 INJECTION INTRAMUSCULAR; INTRAVENOUS ONCE
Status: COMPLETED | OUTPATIENT
Start: 2022-07-07 | End: 2022-07-07

## 2022-07-07 RX ORDER — ONDANSETRON 4 MG/1
4 TABLET, ORALLY DISINTEGRATING ORAL EVERY 8 HOURS PRN
Status: DISCONTINUED | OUTPATIENT
Start: 2022-07-07 | End: 2022-07-08 | Stop reason: HOSPADM

## 2022-07-07 RX ORDER — ACETAMINOPHEN 325 MG/1
650 TABLET ORAL EVERY 6 HOURS PRN
Status: DISCONTINUED | OUTPATIENT
Start: 2022-07-07 | End: 2022-07-08 | Stop reason: HOSPADM

## 2022-07-07 RX ORDER — ONDANSETRON 2 MG/ML
4 INJECTION INTRAMUSCULAR; INTRAVENOUS EVERY 6 HOURS PRN
Status: DISCONTINUED | OUTPATIENT
Start: 2022-07-07 | End: 2022-07-08 | Stop reason: HOSPADM

## 2022-07-07 RX ORDER — ENOXAPARIN SODIUM 100 MG/ML
40 INJECTION SUBCUTANEOUS DAILY
Status: DISCONTINUED | OUTPATIENT
Start: 2022-07-07 | End: 2022-07-08 | Stop reason: HOSPADM

## 2022-07-07 RX ORDER — LATANOPROST 50 UG/ML
1 SOLUTION/ DROPS OPHTHALMIC NIGHTLY
Status: DISCONTINUED | OUTPATIENT
Start: 2022-07-07 | End: 2022-07-08 | Stop reason: HOSPADM

## 2022-07-07 RX ORDER — NALOXONE HYDROCHLORIDE 0.4 MG/ML
0.4 INJECTION, SOLUTION INTRAMUSCULAR; INTRAVENOUS; SUBCUTANEOUS ONCE
Status: DISCONTINUED | OUTPATIENT
Start: 2022-07-07 | End: 2022-07-07

## 2022-07-07 RX ADMIN — SODIUM CHLORIDE, POTASSIUM CHLORIDE, SODIUM LACTATE AND CALCIUM CHLORIDE 1000 ML: 600; 310; 30; 20 INJECTION, SOLUTION INTRAVENOUS at 15:37

## 2022-07-07 RX ADMIN — PROPRANOLOL HYDROCHLORIDE 10 MG: 10 TABLET ORAL at 20:45

## 2022-07-07 RX ADMIN — LATANOPROST 1 DROP: 50 SOLUTION OPHTHALMIC at 20:45

## 2022-07-07 RX ADMIN — ENOXAPARIN SODIUM 40 MG: 100 INJECTION SUBCUTANEOUS at 20:45

## 2022-07-07 RX ADMIN — ONDANSETRON 4 MG: 2 INJECTION INTRAMUSCULAR; INTRAVENOUS at 15:37

## 2022-07-07 NOTE — PLAN OF CARE
Problem: Discharge Planning  Goal: Discharge to home or other facility with appropriate resources  Outcome: Progressing  Flowsheets (Taken 7/7/2022 1841)  Discharge to home or other facility with appropriate resources: Identify barriers to discharge with patient and caregiver     Problem: Safety - Adult  Goal: Free from fall injury  Outcome: Progressing     Problem: Skin/Tissue Integrity  Goal: Absence of new skin breakdown  Description: 1. Monitor for areas of redness and/or skin breakdown  2. Assess vascular access sites hourly  3. Every 4-6 hours minimum:  Change oxygen saturation probe site  4. Every 4-6 hours:  If on nasal continuous positive airway pressure, respiratory therapy assess nares and determine need for appliance change or resting period. Outcome: Progressing     Problem: Confusion  Goal: Confusion, delirium, dementia, or psychosis is improved or at baseline  Description: INTERVENTIONS:  1. Assess for possible contributors to thought disturbance, including medications, impaired vision or hearing, underlying metabolic abnormalities, dehydration, psychiatric diagnoses, and notify attending LIP  2. [place] high risk fall precautions, as indicated  3. Provide frequent short contacts to provide reality reorientation, refocusing and direction  4. Decrease environmental stimuli, including noise as appropriate  5. Monitor and intervene to maintain adequate nutrition, hydration, elimination, sleep and activity  6. If unable to ensure safety without constant attention obtain sitter and review sitter guidelines with assigned personnel  7.  Initiate Psychosocial CNS and Spiritual Care consult, as indicated  Outcome: Progressing     Problem: Pain  Goal: Verbalizes/displays adequate comfort level or baseline comfort level  Outcome: Progressing

## 2022-07-07 NOTE — ED PROVIDER NOTES
[facility] ENCOUNTER      Pt Name: Edu Montilla  MRN: [id number]  YOB: 1965  Date of evaluation: 7/7/2022  Provider: MD Mini Schofield       Chief Complaint   Patient presents with    Fall     Pt via [address], trauma alert called         HISTORY OF PRESENT ILLNESS  (Location/Symptom, Timing/Onset, Context/Setting, Quality, Duration, Modifying Factors, Severity.)   Edu Montilla is a 59 y.o. 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 REVIEW OFSYSTEMS    (2-9 systems for level 4, 10 or more for level 5)   ROS:  General:  No fevers, no chills, no weakness  Cardiovascular:  No chest pain, no palpitations  Respiratory:  No shortness of breath, no cough, no wheezing  Gastrointestinal:  No pain, no nausea, no vomiting, no diarrhea  Musculoskeletal: + muscle pain, + joint pain  Skin:  No rash, no easy bruising  Neurologic:  No speech problems, no headache, no extremity weakness  Psychiatric:  + anxiety  Genitourinary:  No dysuria, no hematuria    Except as noted above the remainder of the review of systems was reviewed and negative.        PAST MEDICAL HISTORY     Past Medical History:   Diagnosis Date    CAD (coronary artery disease)     Cancer ([address]     Charcot-Lakia-Tooth disease     CHF (congestive heart failure) (HCC)     [facility]     COPD (chronic obstructive pulmonary disease) [facility]     MI (myocardial infarction) ([address]     x2    Muscular dystrophies ([facility]          SURGICAL HISTORY       Past Surgical History: Procedure Laterality Date    ANKLE SURGERY      bilateral    ARM SURGERY Bilateral     Crush injury    BRAIN SURGERY      skull injury, metal palte in head    COLONOSCOPY      patient states colon cancer    FRACTURE SURGERY      JOINT REPLACEMENT      left wrist    TONSILLECTOMY           CURRENT MEDICATIONS       Previous Medications    ALBUTEROL SULFATE HFA (VENTOLIN HFA) 108 (90 BASE) MCG/ACT INHALER    Inhale 2 puffs into the lungs Every 4-6 hours as needed. ASPIRIN EC 81 MG EC TABLET    Take 1 tablet by mouth daily    BUDESONIDE-FORMOTEROL (SYMBICORT) 160-4.5 MCG/ACT AERO    Inhale 2 puffs into the lungs 2 times daily    FOLIC ACID (FOLVITE) 1 MG TABLET    Take 1 tablet by mouth daily    GABAPENTIN (NEURONTIN) 300 MG CAPSULE    TAKE ONE CAPSULE BY MOUTH THREE TIMES A DAY    LATANOPROST (XALATAN) 0.005 % OPHTHALMIC SOLUTION    Place 1 drop into both eyes nightly    LISINOPRIL (PRINIVIL;ZESTRIL) 30 MG TABLET    Take 0.5 tablets by mouth daily    NITROGLYCERIN (NITROSTAT) 0.4 MG SL TABLET    Place 1 tablet under the tongue every 5 minutes as needed for Chest pain up to max of 3 total doses. If no relief after 1 dose, call 911. PREDNISONE (DELTASONE) 10 MG TABLET    Take 3 tablets PO daily x 2 days, 2 tablets PO daily x 2 days, 1 tablet PO daily x 2 days, then stop    PROPRANOLOL (INDERAL) 10 MG TABLET    Take 10 mg by mouth 2 times daily    TIOTROPIUM (SPIRIVA HANDIHALER) 18 MCG INHALATION CAPSULE    Inhale 1 capsule into the lungs daily       ALLERGIES     Patient has no known allergies.     FAMILY HISTORY       Family History   Problem Relation Age of Onset    Other Father         charcot preston tooth    Heart Attack Father     Cancer Father         unknown    Heart Attack Brother           SOCIAL HISTORY       Social History     Socioeconomic History    Marital status:      Spouse name: None    Number of children: None    Years of education: None    Highest education level: None Occupational History    None   Tobacco Use    Smoking status: Current Every Day Smoker     Packs/day: 0.50     Years: 40.00     Pack years: 20.00     Types: Cigarettes    Smokeless tobacco: Never Used   Vaping Use    Vaping Use: Never used   Substance and Sexual Activity    Alcohol use: Yes     Comment: 3 beers yesterday    Drug use: Yes     Types: IV, Methamphetamines (Crystal Meth)     Comment: pt rpts meth, IV this week    Sexual activity: Yes     Partners: Female   Other Topics Concern    None   Social History Narrative    None     Social Determinants of Health     Financial Resource Strain:     Difficulty of Paying Living Expenses: Not on file   Food Insecurity:     Worried About Running Out of Food in the Last Year: Not on file    Tammy of Food in the Last Year: Not on file   Transportation Needs:     Lack of Transportation (Medical): Not on file    Lack of Transportation (Non-Medical):  Not on file   Physical Activity:     Days of Exercise per Week: Not on file    Minutes of Exercise per Session: Not on file   Stress:     Feeling of Stress : Not on file   Social Connections:     Frequency of Communication with Friends and Family: Not on file    Frequency of Social Gatherings with Friends and Family: Not on file    Attends Denominational Services: Not on file    Active Member of [address] [place] [address] [employer] or Organizations: Not on file    Attends Club or Organization Meetings: Not on file    Marital Status: Not on file   Intimate Partner Violence:     Fear of Current or Ex-Partner: Not on file    Emotionally Abused: Not on file    Physically Abused: Not on file    Sexually Abused: Not on file   Housing Stability:     Unable to Pay for Housing in the Last Year: Not on file    Number of [place] in the Last Year: Not on file    Unstable Housing in the Last Year: Not on file         PHYSICAL EXAM    (up to 7 for level 4, 8 or more for level 5)     ED Triage Vitals [07/07/22 1352]   BP Temp Temp src Heart Rate Resp SpO2 Height Weight   (!) 150/103 -- -- (!) 104 24 99 % -- --       Physical Exam  General :Patient is awake, alert, oriented, in  acute distress, nontoxic appearing  HEENT: Pupils are equally round and reactive to light, EOMI, conjunctivae clear. Oral mucosa is moist, no exudate. Uvula is midline  Neck: Neck is supple, full range of motion, trachea midline  Cardiac: Heart regular rate, rhythm, no murmurs, rubs, or gallops  Lungs: Lungs are clear to auscultation, there is no wheezing, rhonchi, or rales. There is no use of accessory muscles. Chest wall: There is no tenderness to palpation over the chest wall or over ribs  Abdomen: Abdomen is soft, nontender, nondistended. There is no firm or pulsatile masses, no rebound rigidity or guarding. Musculoskeletal: 5 out of 5 strength in all 4 extremities. No focal muscle deficits are appreciated acute ecchymosis of his third and fourth toes right foot  Neuro: Motor intact, sensory intact, level of consciousness is normal,   Dermatology: Skin is warm and dry  Psych: Mentation is grossly normal, cognition is grossly normal. Affect is agitated      DIAGNOSTIC RESULTS     EKG: All EKG's are interpreted by the Emergency Department Physician who either signs or Co-signs this chart in the [address] a cardiologist.  EKG shows sinus rhythm rate of 94 with borderline prolonged QT interval no acute ST changes      RADIOLOGY:   Non-plain film images such as CT, Ultrasound and MRI are read by the radiologist. Plain radiographic images are visualized and preliminarily interpreted by the emergency physician with the below findings:      ? Radiologist's Report Reviewed:  CT Cervical Spine WO Contrast   Final Result      1. No findings for acute intracranial abnormality. CT CERVICAL SPINE WITHOUT CONTRAST      HISTORY: Fall with neck pain      FINDINGS: Thin section axial images obtained through the cervical spine.   Multiplanar reconstruction images received for interpretation. Low radiation dose CT technique utilized. There is disc space narrowing with endplate spurring at the C4-C5 through C6-C7 disc levels. Mild endplate spurring at L0-Z2 through C7-T1. Posterior limits and facets are maintained. Prevertebral soft tissues are intact. Odontoid and atlantoaxial    joints are unremarkable. Thin section axial images obtained through the cervical spine. Cervical vertebral body height and alignment are intact. Disc spaces are well-maintained. No acute fracture or subluxation. Mild foraminal narrowing at the C3-C4 through C6-C7 disc    levels. Paravertebral soft tissues are intact. Lung apices are clear. IMPRESSION:      1. No findings for acute traumatic cervical spine abnormality. 2.  Multilevel degenerative spondylosis as described. CT Head WO Contrast   Final Result      1. No findings for acute intracranial abnormality. CT CERVICAL SPINE WITHOUT CONTRAST      HISTORY: Fall with neck pain      FINDINGS: Thin section axial images obtained through the cervical spine. Multiplanar reconstruction images received for interpretation. Low radiation dose CT technique utilized. There is disc space narrowing with endplate spurring at the C4-C5 through C6-C7 disc levels. Mild endplate spurring at H7-K3 through C7-T1. Posterior limits and facets are maintained. Prevertebral soft tissues are intact. Odontoid and atlantoaxial    joints are unremarkable. Thin section axial images obtained through the cervical spine. Cervical vertebral body height and alignment are intact. Disc spaces are well-maintained. No acute fracture or subluxation. Mild foraminal narrowing at the C3-C4 through C6-C7 disc    levels. Paravertebral soft tissues are intact. Lung apices are clear. IMPRESSION:      1. No findings for acute traumatic cervical spine abnormality. 2.  Multilevel degenerative spondylosis as described.       XR PELVIS (1-2 VIEWS)   Final Result      1. No findings for acute bony abnormality within the pelvis including the proximal hips. Correlate clinically. XR CHEST PORTABLE   Final Result   1. No active airspace disease   2.  No pneumothorax            ED BEDSIDE ULTRASOUND:   Performed by ED Physician - none    LABS:    I have reviewed and interpreted all of the currently available lab results from this visit (ifapplicable):  Results for orders placed or performed during the hospital encounter of 07/07/22   CBC with Auto Differential   Result Value Ref Range    WBC 13.8 (H) 4.0 - 11.0 K/uL    RBC 4.01 (L) 4.50 - 6.00 M/uL    Hemoglobin 12.7 (L) 13.0 - 18.0 g/dL    Hematocrit 37.1 (L) 40.0 - 54.0 %    MCV 92.5 80.0 - 100.0 fL    MCH 31.7 27.0 - 32.0 pg    MCHC 34.2 31.0 - 35.0 g/dL    RDW 12.8 11.0 - 16.0 %    Platelets 071 131 - 072 K/uL    MPV 10.6 (H) 6.0 - 10.0 fL    Neutrophils % 59.5 %    Immature Granulocytes % 0.4 0.0 - 5.0 %    Lymphocytes % 23.8 %    Monocytes % 16.0 %    Eosinophils % 0.1 %    Basophils % 0.2 %    Neutrophils Absolute 8.2 (H) 2.0 - 7.5 K/uL    Immature Granulocytes # 0.1 K/uL    Lymphocytes Absolute 3.3 1.5 - 4.0 K/uL    Monocytes Absolute 2.2 (H) 0.2 - 0.8 K/uL    Eosinophils Absolute 0.0 0.0 - 0.4 K/uL    Basophils Absolute 0.0 0.0 - 0.1 K/uL   Comprehensive Metabolic Panel w/ Reflex to MG   Result Value Ref Range    Sodium 134 (L) 136 - 145 mmol/L    Potassium reflex Magnesium 3.5 3.4 - 5.1 mmol/L    Chloride 98 98 - 107 mmol/L    CO2 20 20 - 30 mmol/L    Anion Gap 16 3 - 16    Glucose 107 (H) 74 - 106 mg/dL    BUN 39 (H) 6 - 20 mg/dL    CREATININE 1.8 (H) 0.4 - 1.2 mg/dL    GFR Non-African American 39 (L) >59    GFR  47 (L) >59    Calcium 9.0 8.5 - 10.5 mg/dL    Total Protein 7.4 6.4 - 8.3 g/dL    Albumin 4.2 3.4 - 4.8 g/dL    Albumin/Globulin Ratio 1.3 0.8 - 2.0    Total Bilirubin 2.0 (H) 0.3 - 1.2 mg/dL    Alkaline Phosphatase 70 25 - 100 U/L    ALT 51 (H) 4 - 36 U/L    AST 67 (H) 8 - 33 U/L    Globulin 3.2 Not Established g/dL   Urinalysis with Reflex to Culture    Specimen: Urine   Result Value Ref Range    Color, UA Yellow Straw/Yellow    Clarity, UA Clear Clear    Glucose, Ur Negative Negative mg/dL    Bilirubin Urine MODERATE (A) Negative    Ketones, Urine 15 (A) Negative mg/dL    Specific Gravity, UA >=1.030 1.005 - 1.030    Blood, Urine SMALL (A) Negative    pH, UA 5.5 5.0 - 8.0    Protein, UA 30 (A) Negative mg/dL    Urobilinogen, Urine 2.0 (A) <2.0 E.U./dL    Nitrite, Urine Negative Negative    Leukocyte Esterase, Urine Negative Negative    Microscopic Examination YES     Urine Type Catheter     Urine Reflex to Culture Not Indicated    Drug Screen, Multiple, Urine   Result Value Ref Range    PCP Screen, Urine Neg Negative <25 ng/mL    Benzodiazepine Screen, Urine POSITIVE (A) Negative <300 ng/mL    Cocaine Metabolite Screen, Urine Neg Negative <300 ng/mL    Amphetamine Screen, Urine POSITIVE (A) Negative <1000 ng/mL    Cannabinoid Scrn, Ur Neg Negative <50 ng/mL    Opiate Scrn, Ur Neg Negative <300 ng/mL    Barbiturate Screen, Ur Neg Negative <943 ng/mL    Tricyclic Neg Negative <398 ng/mL    Methadone Screen, Urine Neg Negative <300 ng/ml    Propoxyphene Screen, Urine Neg Negative <300 ng/mL    Methamphetamine, Urine POSITIVE (A) Negative <1000 ng/mL    UR Oxycodone Rapid Screen Neg Negative <100 ng/mL    Buprenorphine Qual, Urine Neg Negative <25 ng/mL    Drug Screen Comment: see below    Microscopic Urinalysis   Result Value Ref Range    Hyaline Casts, UA 3-5 (A) 0 - 2 /LPF    WBC, UA 3-5 0 - 5 /HPF    RBC, UA 5-10 (A) 0 - 4 /HPF    Epithelial Cells, UA 11-20 (A) 0 - 5 /HPF   Magnesium   Result Value Ref Range    Magnesium 2.6 (H) 1.7 - 2.4 mg/dL   POCT Glucose   Result Value Ref Range    POC Glucose 90 74 - 106 mg/dl    Performed on ACCU-CHEK         All other labs were within normal range or not returned as of this dictation.     EMERGENCY DEPARTMENT COURSE and DIFFERENTIAL DIAGNOSIS/MDM:   Vitals:    Vitals:    07/07/22 1400 07/07/22 1407 07/07/22 1415 07/07/22 1617   BP: (!) 143/96  (!) 134/93    Pulse: 98  98 85   Resp: 26 21 19   Temp:  98 °F (36.7 °C)     TempSrc:  Oral     SpO2: 96%  100% 97%   Weight:    185 lb (83.9 kg)   Height:    5' 10\" (1.778 m)       MEDICATIONS ADMINISTERED IN ED:  Medications   morphine (PF) injection 2 mg (0 mg IntraVENous Held 7/7/22 1556)   naloxone (NARCAN) injection 0.4 mg (0.4 mg IntraVENous Not Given 7/7/22 1557)   lactated ringers bolus (1,000 mLs IntraVENous New Bag 7/7/22 1537)   ondansetron (ZOFRAN) injection 4 mg (4 mg IntraVENous Given 7/7/22 1537)       CTs and x-rays are all negative. Patient has methamphetamine and benzos in his system this is most likely the cause of his waxing and waning level of consciousness currently he is still very sleepy is arousing to physical stimulus. We will contact peers support to see if they can talk to him at all at this time. Patient is too sleepy at this time to talk to peers support. Since he is oversedated from his benzodiazepine and coming off methamphetamine I will go ahead and admit him to the hospital at this time for observation. CONSULTS:  None    PROCEDURES:  Procedures    CRITICAL CARE TIME    Total Critical Care time was 0 minutes, excluding separately reportable procedures. There was a high probability of clinically significant/life threatening deterioration in the patient's condition which required my urgent intervention. FINAL IMPRESSION      1. Accidental overdose, initial encounter New Problem         DISPOSITION/PLAN   DISPOSITION    Admit to hospital    PATIENT REFERRED TO:  No follow-up provider specified.     DISCHARGE MEDICATIONS:  New Prescriptions    No medications on file       Comment: Please note this report has been produced using speech recognition software and may contain errorsrelated to that system including errors in grammar, punctuation,

## 2022-07-07 NOTE — ED NOTES
During pt triage, pt admits to IV meth use in last few days and 3 beers yesterday, pt denies opioid use however he is going from alert, agitated stated, pupils WDL to snoring with pin point pupils. Pt sts he has been awake for a long time and this was occurring prior to fall from shower chair, pt further sts \"that's the reason I fell today\". This RN clarified with pt. .. Pt reports injecting meth, staying awake for long period of time, drinking beer yesterday and blames his ability to remain awake the reason for falling from his shower chair. Pt is responsive to pain when he falls asleep and arouses easily. MD notified. Order requested for 0.4 narcan as sx indicated opioid are on board with meth.       Dang Hennessy RN  07/07/22 5421

## 2022-07-08 VITALS
OXYGEN SATURATION: 100 % | HEIGHT: 70 IN | WEIGHT: 185 LBS | HEART RATE: 96 BPM | SYSTOLIC BLOOD PRESSURE: 104 MMHG | BODY MASS INDEX: 26.48 KG/M2 | RESPIRATION RATE: 18 BRPM | DIASTOLIC BLOOD PRESSURE: 83 MMHG | TEMPERATURE: 97.6 F

## 2022-07-08 PROBLEM — R35.1 NOCTURIA: Status: RESOLVED | Noted: 2017-05-07 | Resolved: 2022-07-08

## 2022-07-08 PROBLEM — E87.6 HYPOKALEMIA: Status: ACTIVE | Noted: 2022-07-08

## 2022-07-08 PROBLEM — Z53.29 LEFT AGAINST MEDICAL ADVICE: Status: ACTIVE | Noted: 2017-05-31
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
## 2022-07-08 PROBLEM — I10 HTN (HYPERTENSION): Status: ACTIVE | Noted: 2022-07-08

## 2022-07-08 PROBLEM — T50.911A DRUG OVERDOSE, MULTIPLE DRUGS, ACCIDENTAL OR UNINTENTIONAL, INITIAL ENCOUNTER: Status: ACTIVE | Noted: 2022-07-08

## 2022-07-08 LAB
A/G RATIO: 1.2 (ref 0.8–2)
ALBUMIN SERPL-MCNC: 3.6 G/DL (ref 3.4–4.8)
ALP BLD-CCNC: 63 U/L (ref 25–100)
ALT SERPL-CCNC: 43 U/L (ref 4–36)
ANION GAP SERPL CALCULATED.3IONS-SCNC: 12 MMOL/L (ref 3–16)
AST SERPL-CCNC: 60 U/L (ref 8–33)
BILIRUB SERPL-MCNC: 1.7 MG/DL (ref 0.3–1.2)
BUN BLDV-MCNC: 26 MG/DL (ref 6–20)
CALCIUM SERPL-MCNC: 8.8 MG/DL (ref 8.5–10.5)
CHLORIDE BLD-SCNC: 104 MMOL/L (ref 98–107)
CO2: 23 MMOL/L (ref 20–30)
CREAT SERPL-MCNC: 1 MG/DL (ref 0.4–1.2)
GFR AFRICAN AMERICAN: >59
GFR NON-AFRICAN AMERICAN: >59
GLOBULIN: 2.9 G/DL
GLUCOSE BLD-MCNC: 91 MG/DL (ref 74–106)
HCT VFR BLD CALC: 38.4 % (ref 40–54)
HEMOGLOBIN: 12.9 G/DL (ref 13–18)
MAGNESIUM: 2.3 MG/DL (ref 1.7–2.4)
MCH RBC QN AUTO: 31.5 PG (ref 27–32)
MCHC RBC AUTO-ENTMCNC: 33.6 G/DL (ref 31–35)
MCV RBC AUTO: 93.7 FL (ref 80–100)
PDW BLD-RTO: 12.9 % (ref 11–16)
PLATELET # BLD: 248 K/UL (ref 150–400)
PMV BLD AUTO: 10.3 FL (ref 6–10)
POTASSIUM REFLEX MAGNESIUM: 3.3 MMOL/L (ref 3.4–5.1)
RBC # BLD: 4.1 M/UL (ref 4.5–6)
SODIUM BLD-SCNC: 139 MMOL/L (ref 136–145)
TOTAL PROTEIN: 6.5 G/DL (ref 6.4–8.3)
WBC # BLD: 10.3 K/UL (ref 4–11)

## 2022-07-08 PROCEDURE — 36415 COLL VENOUS BLD VENIPUNCTURE: CPT

## 2022-07-08 PROCEDURE — 80053 COMPREHEN METABOLIC PANEL: CPT

## 2022-07-08 PROCEDURE — 85027 COMPLETE CBC AUTOMATED: CPT

## 2022-07-08 PROCEDURE — 83735 ASSAY OF MAGNESIUM: CPT

## 2022-07-08 PROCEDURE — 6370000000 HC RX 637 (ALT 250 FOR IP): Performed by: PHYSICIAN ASSISTANT

## 2022-07-08 PROCEDURE — G0378 HOSPITAL OBSERVATION PER HR: HCPCS

## 2022-07-08 RX ORDER — POTASSIUM CHLORIDE 750 MG/1
40 CAPSULE, EXTENDED RELEASE ORAL ONCE
Status: DISCONTINUED | OUTPATIENT
Start: 2022-07-08 | End: 2022-07-08 | Stop reason: HOSPADM

## 2022-07-08 RX ADMIN — PROPRANOLOL HYDROCHLORIDE 10 MG: 10 TABLET ORAL at 08:35

## 2022-07-08 RX ADMIN — LISINOPRIL 15 MG: 10 TABLET ORAL at 08:35

## 2022-07-08 RX ADMIN — FOLIC ACID 1 MG: 1 TABLET ORAL at 08:35

## 2022-07-08 RX ADMIN — ASPIRIN 81 MG: 81 TABLET, COATED ORAL at 08:35

## 2022-07-08 RX ADMIN — ACETAMINOPHEN 650 MG: 325 TABLET, FILM COATED ORAL at 04:45

## 2022-07-08 ASSESSMENT — PAIN SCALES - GENERAL: PAINLEVEL_OUTOF10: 10

## 2022-07-08 NOTE — PROGRESS NOTES
Peer Recovery Support Note    Name: Lacy Haq  Date: 7/8/2022    Chief Complaint   Patient presents with    Fall     Pt via [facility], trauma alert called       Peer Support met with patient. [] Support and education provided  [] Resources provided   [] Treatment referral:   [] Other:   [x] Patient declined peer recovery services     Referred By:     Notes: Pt was sitting in his bed when I walked in and introduced myself. The second I told him what a  was and I mention I had lived experience with drugs and alcohol he started waving his hands in the air and motioning to the door and said very loudly and hostile \"I got high one time in 5 f-ing years and all you people think I need help. I do not need any GD help. \" I informed him that I did not need to be spoke to like that and I only came in there because I was concern for him and he had no right to start yelling at me. I then walked out of the room and went to tell the nurses at their nurse station.      Signed: Misti Batres, 7/8/2022

## 2022-07-08 NOTE — PROGRESS NOTES
Pt wishes to sign out AMA. Pt encouraged to stay and wait for a regular dc order, but Pt adamant on leaving. Breana Pierre PA-C notified and Lambert José RN, Supervisor aware. Pt zamudio and IV's removed. Family member here with pt at this time. Pt signed AMA paper and it has been placed in chart.

## 2022-07-08 NOTE — H&P
Short Stay Summary      Patient ID: Oma Anguiano      Patient's PCP: VY Werner    Admit Date: 7/7/2022     Discharge Date:   7/8/2022    Admitting Physician: Georgia Brennan MD    Discharge Physician: VY Jefferson     Reason for this admission:   Acute encephalopathy  Unintentional drug overdose  AMA    Discharge Diagnoses: Active Hospital Problems    Diagnosis Date Noted    HTN (hypertension) [I10] 07/08/2022     Priority: Medium    Hypokalemia [E87.6] 07/08/2022     Priority: Medium    Fall [W19. 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 XR ANKLE LEFT (MIN 3 VIEWS)   Final Result   Impression:   No acute osseous injury. XR ANKLE RIGHT (MIN 3 VIEWS)   Final Result   Impression:   No acute osseous injury. XR FOOT RIGHT (2 VIEWS)   Final Result   Impression:   No acute osseous abnormality in the right or left foot. CT Cervical Spine WO Contrast   Final Result      1. No findings for acute intracranial abnormality. CT CERVICAL SPINE WITHOUT CONTRAST      HISTORY: Fall with neck pain      FINDINGS: Thin section axial images obtained through the cervical spine. Multiplanar reconstruction images received for interpretation. Low radiation dose CT technique utilized.       There is disc space narrowing with endplate spurring at the C4-C5 through C6-C7 disc levels. Mild endplate spurring at N9-N4 through C7-T1. Posterior limits and facets are maintained. Prevertebral soft tissues are intact. Odontoid and atlantoaxial    joints are unremarkable. Thin section axial images obtained through the cervical spine. Cervical vertebral body height and alignment are intact. Disc spaces are well-maintained. No acute fracture or subluxation. Mild foraminal narrowing at the C3-C4 through C6-C7 disc    levels. Paravertebral soft tissues are intact. Lung apices are clear. IMPRESSION:      1. No findings for acute traumatic cervical spine abnormality. 2.  Multilevel degenerative spondylosis as described. CT Head WO Contrast   Final Result      1. No findings for acute intracranial abnormality. CT CERVICAL SPINE WITHOUT CONTRAST      HISTORY: Fall with neck pain      FINDINGS: Thin section axial images obtained through the cervical spine. Multiplanar reconstruction images received for interpretation. Low radiation dose CT technique utilized. There is disc space narrowing with endplate spurring at the C4-C5 through C6-C7 disc levels. Mild endplate spurring at Y8-P7 through C7-T1. Posterior limits and facets are maintained. Prevertebral soft tissues are intact. Odontoid and atlantoaxial    joints are unremarkable. Thin section axial images obtained through the cervical spine. Cervical vertebral body height and alignment are intact. Disc spaces are well-maintained. No acute fracture or subluxation. Mild foraminal narrowing at the C3-C4 through C6-C7 disc    levels. Paravertebral soft tissues are intact. Lung apices are clear. IMPRESSION:      1. No findings for acute traumatic cervical spine abnormality. 2.  Multilevel degenerative spondylosis as described. XR PELVIS (1-2 VIEWS)   Final Result      1. No findings for acute bony abnormality within the pelvis including the proximal hips. Correlate clinically. XR CHEST PORTABLE   Final Result   1. No active airspace disease   2. No pneumothorax            Consults:   IP CONSULT TO CASE MANAGEMENT  IP CONSULT TO SPIRITUAL SERVICES  PT OT    HISTORY OF PRESENT ILLNESS:   The patient is a 59 y.o. male with PMH of IVDA, CAD s/p MI, Charcot-Lakia-Tooth disease, CHF, colon cancer, COPD and muscular dystrophies who presented to the ER via EMS as a trauma alert after sustaining a fall at home. Pt reported being in the shower when his shower chair flipped over and he landed on his right side. He reported hitting his head on the commode, knocking himself unconscious. He complained of left and feet pain. Imaging including CXR, CT cervical spine, CT head, XR pelvis, XR left and right foot and ankle were negative for acute fracture. Admission UDS positive for amphetamines, BZDs, methamphetamines. Pt received IV morphine for pain while in ER. He was later noted to be very sleepy and given narcan with no response. Pt was too sleepy to discharge home so he was admitted here for observation. No acute events overnight. Once patient awakened this morning, he became belligerent to staff. He verbally accosted the peers .  He then demanded to leave the facility. He signed AMA papers. Family arrived to pick him up. Patient then left the facility. He was not seen or examined prior to leaving AMA.     Review of system  Unable to obtain    Past Medical History:      Diagnosis Date    CAD (coronary artery disease)     Cancer ([facility] [address]     Charcot-Lakia-Tooth disease     CHF (congestive heart failure) (HCC)     Colon cancer ([facility] [address]     COPD (chronic obstructive pulmonary disease) ([facility] [address]     MI (myocardial infarction) ([facility] [address]     x2    Muscular dystrophies ([facility] [address]        Past Surgical History:      Procedure Laterality Date    ANKLE SURGERY      bilateral    ARM SURGERY Bilateral     Crush injury    BRAIN SURGERY      skull injury, metal palte in head    COLONOSCOPY      patient states colon cancer    FRACTURE SURGERY      JOINT REPLACEMENT      left wrist    TONSILLECTOMY         Social History:   TOBACCO:   reports that he has been smoking cigarettes. He has a 20.00 pack-year smoking history. He has never used smokeless tobacco.  ETOH:   reports current alcohol use. OCCUPATION:  None    Family History:       Problem Relation Age of Onset    Other Father         sharmaine johnston tooth    Heart Attack Father     Cancer Father         unknown    Heart Attack Brother        Allergies:  Patient has no known allergies. Medications Prior to Admission:    Prior to Admission medications    Medication Sig Start Date End Date Taking? Authorizing Provider   lisinopril (PRINIVIL;ZESTRIL) 30 MG tablet Take 0.5 tablets by mouth daily 4/9/22   VY Kumar   budesonide-formoterol [facility]) 160-4.5 MCG/ACT AERO Inhale 2 puffs into the lungs 2 times daily 4/9/22   VY Kumar   tiotropium (SPIRIVA HANDIHALER) 18 MCG inhalation capsule Inhale 1 capsule into the lungs daily 4/9/22   VY Kumar   predniSONE (DELTASONE) 10 MG tablet Take 3 tablets PO daily x 2 days, 2 tablets PO daily x 2 days, 1 tablet PO daily x 2 days, then stop 4/9/22   VY Kumar   aspirin EC 81 MG EC tablet Take 1 tablet by mouth daily 4/9/22   VY Kumar   folic acid (FOLVITE) 1 MG tablet Take 1 tablet by mouth daily 4/9/22   VY Kumar   nitroGLYCERIN (NITROSTAT) 0.4 MG SL tablet Place 1 tablet under the tongue every 5 minutes as needed for Chest pain up to max of 3 total doses.  If no relief after 1 dose, call 911. 4/9/22   VY Kumar   latanoprost (XALATAN) 0.005 % ophthalmic solution Place 1 drop into both eyes nightly    Historical Provider, MD   propranolol (INDERAL) 10 MG tablet Take 10 mg by mouth 2 times daily    Historical Provider, MD   albuterol sulfate HFA (VENTOLIN HFA) 108 (90 Base) MCG/ACT inhaler Inhale 2 puffs into the lungs Every 4-6 hours as needed. Historical Provider, MD   gabapentin (NEURONTIN) 300 MG capsule TAKE ONE CAPSULE BY MOUTH THREE TIMES A DAY  Patient taking differently: Take 300 mg by mouth 3 times daily. 3/26/21 4/25/21  Yessica Age, APRN       Vital Signs  Temp: 97.6 °F (36.4 °C)  Heart Rate: 96  Resp: 18  BP: 104/83  SpO2: 100 %  O2 Device: None (Room air)       Vital signs reviewed in electronic chart. Physical exam  Unable to evaluate      Lab Results   Component Value Date    WBC 10.3 07/08/2022    HGB 12.9 (L) 07/08/2022    HCT 38.4 (L) 07/08/2022    MCV 93.7 07/08/2022     07/08/2022       Lab Results   Component Value Date     07/08/2022    K 3.3 (L) 07/08/2022     07/08/2022    CO2 23 07/08/2022    BUN 26 (H) 07/08/2022    CREATININE 1.0 07/08/2022    GLUCOSE 91 07/08/2022    CALCIUM 8.8 07/08/2022    PROT 6.5 07/08/2022    LABALBU 3.6 07/08/2022    BILITOT 1.7 (H) 07/08/2022    ALKPHOS 63 07/08/2022    AST 60 (H) 07/08/2022    ALT 43 (H) 07/08/2022    LABGLOM >59 07/08/2022    GFRAA >59 07/08/2022    AGRATIO 1.2 07/08/2022    GLOB 2.9 07/08/2022           PA/lat CXR:   XR FOOT LEFT (2 VIEWS)   Final Result   Impression:   No acute osseous abnormality in the right or left foot. XR ANKLE LEFT (MIN 3 VIEWS)   Final Result   Impression:   No acute osseous injury. XR ANKLE RIGHT (MIN 3 VIEWS)   Final Result   Impression:   No acute osseous injury. XR FOOT RIGHT (2 VIEWS)   Final Result   Impression:   No acute osseous abnormality in the right or left foot. CT Cervical Spine WO Contrast   Final Result      1. No findings for acute intracranial abnormality. CT CERVICAL SPINE WITHOUT CONTRAST      HISTORY: Fall with neck pain      FINDINGS: Thin section axial images obtained through the cervical spine. Multiplanar reconstruction images received for interpretation. Low radiation dose CT technique utilized.       There is disc space narrowing with endplate spurring at the C4-C5 through C6-C7 disc levels. Mild endplate spurring at R4-T2 through C7-T1. Posterior limits and facets are maintained. Prevertebral soft tissues are intact. Odontoid and atlantoaxial    joints are unremarkable. Thin section axial images obtained through the cervical spine. Cervical vertebral body height and alignment are intact. Disc spaces are well-maintained. No acute fracture or subluxation. Mild foraminal narrowing at the C3-C4 through C6-C7 disc    levels. Paravertebral soft tissues are intact. Lung apices are clear. IMPRESSION:      1. No findings for acute traumatic cervical spine abnormality. 2.  Multilevel degenerative spondylosis as described. CT Head WO Contrast   Final Result      1. No findings for acute intracranial abnormality. CT CERVICAL SPINE WITHOUT CONTRAST      HISTORY: Fall with neck pain      FINDINGS: Thin section axial images obtained through the cervical spine. Multiplanar reconstruction images received for interpretation. Low radiation dose CT technique utilized. There is disc space narrowing with endplate spurring at the C4-C5 through C6-C7 disc levels. Mild endplate spurring at E1-N2 through C7-T1. Posterior limits and facets are maintained. Prevertebral soft tissues are intact. Odontoid and atlantoaxial    joints are unremarkable. Thin section axial images obtained through the cervical spine. Cervical vertebral body height and alignment are intact. Disc spaces are well-maintained. No acute fracture or subluxation. Mild foraminal narrowing at the C3-C4 through C6-C7 disc    levels. Paravertebral soft tissues are intact. Lung apices are clear. IMPRESSION:      1. No findings for acute traumatic cervical spine abnormality. 2.  Multilevel degenerative spondylosis as described. XR PELVIS (1-2 VIEWS)   Final Result      1.   No findings for acute bony abnormality within the pelvis including the proximal hips. Correlate clinically. XR CHEST PORTABLE   Final Result   1. No active airspace disease   2. No pneumothorax          Assessment and Plan/Hospital course: Active Hospital Problems    Diagnosis Date Noted    HTN (hypertension) [I10] 07/08/2022     Priority: Medium    Hypokalemia [E87.6] 07/08/2022     Priority: Medium    Fall [W19. 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       Disposition: AMA    Discharged Condition: Stable    Activity: activity as tolerated  Diet: regular diet  Follow Up: Primary Care Physician in ASAP    Discharge Medications:     Discharge Medication List as of 7/8/2022 11:20 AM           Details   lisinopril (PRINIVIL;ZESTRIL) 30 MG tablet Take 0.5 tablets by mouth daily, Disp-30 tablet, R-3Adjust Sig      budesonide-formoterol (SYMBICORT) 160-4.5 MCG/ACT AERO Inhale 2 puffs into the lungs 2 times daily, Disp-10.2 g, R-0Normal      tiotropium (SPIRIVA HANDIHALER) 18 MCG inhalation capsule Inhale 1 capsule into the lungs daily, Disp-30 capsule, R-0Normal      predniSONE (DELTASONE) 10 MG tablet Take 3 tablets PO daily x 2 days, 2 tablets PO daily x 2 days, 1 tablet PO daily x 2 days, then stop, Disp-12 tablet, R-0Normal      aspirin EC 81 MG EC tablet Take 1 tablet by mouth daily, Disp-30 tablet, [id number]      folic acid (FOLVITE) 1 MG tablet Take 1 tablet by mouth daily, Disp-30 tablet, R-0Normal      nitroGLYCERIN (NITROSTAT) 0.4 MG SL tablet Place 1 tablet under the tongue every 5 minutes as needed for Chest pain up to max of 3 total doses. If no relief after 1 dose, call 911., Disp-25 tablet, R-0Normal      latanoprost (XALATAN) 0.005 % ophthalmic solution Place 1 drop into both eyes nightlyHistorical Med      propranolol (INDERAL) 10 MG tablet Take 10 mg by mouth 2 times dailyHistorical Med      albuterol sulfate HFA (VENTOLIN HFA) 108 (90 Base) MCG/ACT inhaler Inhale 2 puffs into the lungs Every 4-6 hours as needed. Historical Med      gabapentin (NEURONTIN) 300 MG capsule TAKE ONE CAPSULE BY MOUTH THREE TIMES A DAY, Disp-90 capsule, R-0Normal              Patient was seen and examined by Dr. Jose Rebollar and plan of care reviewed. Treatment plan was formulated collaboratively. Signed:  Electronically signed by VY Hansen on 7/8/2022 at 11:04 AM       Thank you VY Woody for the opportunity to be involved in this patient's care. If you have any questions or concerns please feel free to contact me at [phone].

## 2022-07-08 NOTE — DISCHARGE SUMMARY
Short Stay Summary        Patient ID: Eusebio Almanzar                 Patient's PCP: VY Hester     Admit Date:    7/7/2022      Discharge Date:   7/8/2022     Admitting Physician: Sanjuana Malik MD     Discharge Physician: VY Escalante      Reason for this admission:   Acute encephalopathy  Unintentional drug overdose  AMA     Discharge Diagnoses: Active Hospital Problems     Diagnosis Date Noted    HTN (hypertension) [I10] 07/08/2022       Priority: Medium    Hypokalemia [E87.6] 07/08/2022       Priority: Medium    Fall [W19. 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           XR ANKLE LEFT (MIN 3 VIEWS)   Final Result   Impression:   No acute osseous injury.           XR ANKLE RIGHT (MIN 3 VIEWS)   Final Result   Impression:   No acute osseous injury.           XR FOOT RIGHT (2 VIEWS)   Final Result   Impression:   No acute osseous abnormality in the right or left foot.           CT Cervical Spine WO Contrast   Final Result       1. No findings for acute intracranial abnormality.               CT CERVICAL SPINE WITHOUT CONTRAST       HISTORY: Fall with neck pain       FINDINGS: Thin section axial images obtained through the cervical spine. Multiplanar reconstruction images received for interpretation.   Low radiation dose CT technique utilized.       There is disc space narrowing with endplate spurring at the C4-C5 through C6-C7 disc levels. Mild endplate spurring at J7-W1 through C7-T1. Posterior limits and facets are maintained. Prevertebral soft tissues are intact. Odontoid and atlantoaxial    joints are unremarkable.       Thin section axial images obtained through the cervical spine. Cervical vertebral body height and alignment are intact. Disc spaces are well-maintained. No acute fracture or subluxation. Mild foraminal narrowing at the C3-C4 through C6-C7 disc    levels. Paravertebral soft tissues are intact. Lung apices are clear.       IMPRESSION:       1. No findings for acute traumatic cervical spine abnormality.       2.  Multilevel degenerative spondylosis as described.       CT Head WO Contrast   Final Result       1. No findings for acute intracranial abnormality.               CT CERVICAL SPINE WITHOUT CONTRAST       HISTORY: Fall with neck pain       FINDINGS: Thin section axial images obtained through the cervical spine. Multiplanar reconstruction images received for interpretation. Low radiation dose CT technique utilized.       There is disc space narrowing with endplate spurring at the C4-C5 through C6-C7 disc levels. Mild endplate spurring at P1-K1 through C7-T1. Posterior limits and facets are maintained. Prevertebral soft tissues are intact. Odontoid and atlantoaxial    joints are unremarkable.       Thin section axial images obtained through the cervical spine. Cervical vertebral body height and alignment are intact. Disc spaces are well-maintained. No acute fracture or subluxation. Mild foraminal narrowing at the C3-C4 through C6-C7 disc    levels. Paravertebral soft tissues are intact. Lung apices are clear.       IMPRESSION:       1. No findings for acute traumatic cervical spine abnormality.       2.  Multilevel degenerative spondylosis as described.       XR PELVIS (1-2 VIEWS)   Final Result       1.   No findings for acute bony abnormality within the pelvis including the proximal hips. Correlate clinically.       XR CHEST PORTABLE   Final Result   1. No active airspace disease   2. No pneumothorax                Consults:   IP CONSULT TO CASE MANAGEMENT  IP CONSULT TO SPIRITUAL SERVICES  PT OT     HISTORY OF PRESENT ILLNESS:   The patient is a 59 y.o. male with PMH of IVDA, CAD s/p MI, Charcot-Lakia-Tooth disease, CHF, colon cancer, COPD and muscular dystrophies who presented to the ER via EMS as a trauma alert after sustaining a fall at home. Pt reported being in the shower when his shower chair flipped over and he landed on his right side. He reported hitting his head on the commode, knocking himself unconscious. He complained of left and feet pain. Imaging including CXR, CT cervical spine, CT head, XR pelvis, XR left and right foot and ankle were negative for acute fracture. Admission UDS positive for amphetamines, BZDs, methamphetamines. Pt received IV morphine for pain while in ER. He was later noted to be very sleepy and given narcan with no response. Pt was too sleepy to discharge home so he was admitted here for observation.     No acute events overnight. Once patient awakened this morning, he became belligerent to staff. He verbally accosted the peers .  He then demanded to leave the facility. He signed AMA papers. Family arrived to pick him up. Patient then left the facility.   He was not seen or examined prior to leaving AMA.     Review of system  Unable to obtain     Past Medical History:  Past Medical History             Diagnosis Date    CAD (coronary artery disease)      Cancer ([facility] [address]      Charcot-Lakia-Tooth disease      CHF (congestive heart failure) (HCC)      Colon cancer ([address]      COPD (chronic obstructive pulmonary disease) ([address]      MI (myocardial infarction) ([address]       x2    Muscular dystrophies ([address]              Past Surgical History:  Past Surgical History Procedure Laterality Date    ANKLE SURGERY         bilateral    ARM SURGERY Bilateral       Crush injury    BRAIN SURGERY         skull injury, metal palte in head    COLONOSCOPY         patient states colon cancer    FRACTURE SURGERY        JOINT REPLACEMENT         left wrist    TONSILLECTOMY                Social History:   TOBACCO:   reports that he has been smoking cigarettes. He has a 20.00 pack-year smoking history. He has never used smokeless tobacco.  ETOH:   reports current alcohol use. OCCUPATION:  None     Family History:   Family History             Problem Relation Age of Onset    Other Father           charcot preston tooth    Heart Attack Father      Cancer Father           unknown    Heart Attack Brother              Allergies:  Patient has no known allergies.     Medications Prior to Admission:    Home Medications           Prior to Admission medications    Medication Sig Start Date End Date Taking? Authorizing Provider   lisinopril (PRINIVIL;ZESTRIL) 30 MG tablet Take 0.5 tablets by mouth daily 4/9/22     VY Malik   budesonide-formoterol [facility]) 160-4.5 MCG/ACT AERO Inhale 2 puffs into the lungs 2 times daily 4/9/22     VY Malik   tiotropium (SPIRIVA HANDIHALER) 18 MCG inhalation capsule Inhale 1 capsule into the lungs daily 4/9/22     VY Malik   predniSONE (DELTASONE) 10 MG tablet Take 3 tablets PO daily x 2 days, 2 tablets PO daily x 2 days, 1 tablet PO daily x 2 days, then stop 4/9/22     VY Malik   aspirin EC 81 MG EC tablet Take 1 tablet by mouth daily 4/9/22     VY Malik   folic acid (FOLVITE) 1 MG tablet Take 1 tablet by mouth daily 4/9/22     VY Malik   nitroGLYCERIN (NITROSTAT) 0.4 MG SL tablet Place 1 tablet under the tongue every 5 minutes as needed for Chest pain up to max of 3 total doses.  If no relief after 1 dose, call 911. 4/9/22     VY Malik   latanoprost (XALATAN) 0.005 % ophthalmic solution Place 1 drop into both eyes nightly       Historical Provider, MD   propranolol (INDERAL) 10 MG tablet Take 10 mg by mouth 2 times daily       Historical Provider, MD   albuterol sulfate HFA (VENTOLIN HFA) 108 (90 Base) MCG/ACT inhaler Inhale 2 puffs into the lungs Every 4-6 hours as needed.       Historical Provider, MD   gabapentin (NEURONTIN) 300 MG capsule TAKE ONE CAPSULE BY MOUTH THREE TIMES A DAY  Patient taking differently: Take 300 mg by mouth 3 times daily. 3/26/21 4/25/21   PEACE Marcus Arm            Vital Signs  Temp: 97.6 °F (36.4 °C)  Heart Rate: 96  Resp: 18  BP: 104/83  SpO2: 100 %  O2 Device: None (Room air)     Vital signs reviewed in electronic chart.     Physical exam  Unable to evaluate              Lab Results   Component Value Date     WBC 10.3 07/08/2022     HGB 12.9 (L) 07/08/2022     HCT 38.4 (L) 07/08/2022     MCV 93.7 07/08/2022      07/08/2022               Lab Results   Component Value Date      07/08/2022     K 3.3 (L) 07/08/2022      07/08/2022     CO2 23 07/08/2022     BUN 26 (H) 07/08/2022     CREATININE 1.0 07/08/2022     GLUCOSE 91 07/08/2022     CALCIUM 8.8 07/08/2022     PROT 6.5 07/08/2022     LABALBU 3.6 07/08/2022     BILITOT 1.7 (H) 07/08/2022     ALKPHOS 63 07/08/2022     AST 60 (H) 07/08/2022     ALT 43 (H) 07/08/2022     LABGLOM >59 07/08/2022     GFRAA >59 07/08/2022     AGRATIO 1.2 07/08/2022     GLOB 2.9 07/08/2022               PA/lat CXR:   XR FOOT LEFT (2 VIEWS)   Final Result   Impression:   No acute osseous abnormality in the right or left foot.           XR ANKLE LEFT (MIN 3 VIEWS)   Final Result   Impression:   No acute osseous injury.           XR ANKLE RIGHT (MIN 3 VIEWS)   Final Result   Impression:   No acute osseous injury.           XR FOOT RIGHT (2 VIEWS)   Final Result   Impression:   No acute osseous abnormality in the right or left foot.           CT Cervical Spine WO Contrast   Final Result       1.   No findings for acute intracranial abnormality.               CT CERVICAL SPINE WITHOUT CONTRAST       HISTORY: Fall with neck pain       FINDINGS: Thin section axial images obtained through the cervical spine. Multiplanar reconstruction images received for interpretation. Low radiation dose CT technique utilized.       There is disc space narrowing with endplate spurring at the C4-C5 through C6-C7 disc levels. Mild endplate spurring at D2-Q0 through C7-T1. Posterior limits and facets are maintained. Prevertebral soft tissues are intact. Odontoid and atlantoaxial    joints are unremarkable.       Thin section axial images obtained through the cervical spine. Cervical vertebral body height and alignment are intact. Disc spaces are well-maintained. No acute fracture or subluxation. Mild foraminal narrowing at the C3-C4 through C6-C7 disc    levels. Paravertebral soft tissues are intact. Lung apices are clear.       IMPRESSION:       1. No findings for acute traumatic cervical spine abnormality.       2.  Multilevel degenerative spondylosis as described.       CT Head WO Contrast   Final Result       1. No findings for acute intracranial abnormality.               CT CERVICAL SPINE WITHOUT CONTRAST       HISTORY: Fall with neck pain       FINDINGS: Thin section axial images obtained through the cervical spine. Multiplanar reconstruction images received for interpretation. Low radiation dose CT technique utilized.       There is disc space narrowing with endplate spurring at the C4-C5 through C6-C7 disc levels. Mild endplate spurring at H8-X0 through C7-T1. Posterior limits and facets are maintained. Prevertebral soft tissues are intact. Odontoid and atlantoaxial    joints are unremarkable.       Thin section axial images obtained through the cervical spine. Cervical vertebral body height and alignment are intact. Disc spaces are well-maintained. No acute fracture or subluxation.   Mild foraminal narrowing at the C3-C4 through C6-C7 disc    levels. Paravertebral soft tissues are intact. Lung apices are clear.       IMPRESSION:       1. No findings for acute traumatic cervical spine abnormality.       2.  Multilevel degenerative spondylosis as described.       XR PELVIS (1-2 VIEWS)   Final Result       1. No findings for acute bony abnormality within the pelvis including the proximal hips. Correlate clinically.       XR CHEST PORTABLE   Final Result   1. No active airspace disease   2. No pneumothorax             Assessment and Plan/Hospital course:            Active Hospital Problems     Diagnosis Date Noted    HTN (hypertension) [I10] 07/08/2022       Priority: Medium    Hypokalemia [E87.6] 07/08/2022       Priority: Medium    Fall [W19. 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         Disposition: AMA     Discharged Condition: Stable     Activity: activity as tolerated  Diet: regular diet  Follow Up: Primary Care Physician in ASAP     Discharge Medications:      Discharge Medications        Discharge Medication List as of 7/8/2022 11:20 AM                 Details   lisinopril (PRINIVIL;ZESTRIL) 30 MG tablet Take 0.5 tablets by mouth daily, Disp-30 tablet, R-3Adjust Sig       budesonide-formoterol (SYMBICORT) 160-4.5 MCG/ACT AERO Inhale 2 puffs into the lungs 2 times daily, Disp-10.2 g, R-0Normal       tiotropium (SPIRIVA HANDIHALER) 18 MCG inhalation capsule Inhale 1 capsule into the lungs daily, Disp-30 capsule, R-0Normal       predniSONE (DELTASONE) 10 MG tablet Take 3 tablets PO daily x 2 days, 2 tablets PO daily x 2 days, 1 tablet PO daily x 2 days, then stop, Disp-12 tablet, R-0Normal       aspirin EC 81 MG EC tablet Take 1 tablet by mouth daily, Disp-30 tablet, [id number]       folic acid (FOLVITE) 1 MG tablet Take 1 tablet by mouth daily, Disp-30 tablet, R-0Normal       nitroGLYCERIN (NITROSTAT) 0.4 MG SL tablet Place 1 tablet under the tongue every 5 minutes as needed for Chest pain up to max of 3 total doses. If no relief after 1 dose, call 911., Disp-25 tablet, R-0Normal       latanoprost (XALATAN) 0.005 % ophthalmic solution Place 1 drop into both eyes nightlyHistorical Med       propranolol (INDERAL) 10 MG tablet Take 10 mg by mouth 2 times dailyHistorical Med       albuterol sulfate HFA (VENTOLIN HFA) 108 (90 Base) MCG/ACT inhaler Inhale 2 puffs into the lungs Every 4-6 hours as needed. Historical Med       gabapentin (NEURONTIN) 300 MG capsule TAKE ONE CAPSULE BY MOUTH THREE TIMES A DAY, Disp-90 capsule, R-0Normal                    Patient was seen and examined by Dr. Gilbert Sheppard and plan of care reviewed.  Treatment plan was formulated collaboratively.        Signed:  Electronically signed by VY Seth on 7/8/2022 at 11:04 AM         Thank you VY Keating for the opportunity to be involved in this patient's care.  If you have any questions or concerns please feel free to contact me at [phone].

## 2022-07-08 NOTE — FLOWSHEET NOTE
07/08/22 0835   Assessment   Charting Type Shift assessment   Psychosocial   Psychosocial (WDL) X   Patient Behaviors Anxious; Verbal;Flat affect; Agitated   Neurological   Level of Consciousness Alert (0)   Orientation Level Oriented to person;Oriented to place;Oriented to time;Oriented to situation   Cognition Follows commands;Poor judgement;Poor safety awareness;Poor attention/concentration   Speech Slurred;Delayed responses   Shasha Coma Scale   Eye Opening 4   Best Verbal Response 5   Best Motor Response 6   Shasha Coma Scale Score 15   Respiratory   Respiratory (WDL) WDL   Cardiac   Cardiac (WDL) WDL   Gastrointestinal   Abdominal (WDL) WDL   Genitourinary   Genitourinary (WDL) WDL   Peripheral Vascular   Peripheral Vascular (WDL) WDL   Skin Integumentary    Skin Integumentary (WDL) X   Skin Color Pale   Skin Condition/Temp Dry; Warm   Musculoskeletal   Musculoskeletal (WDL) X   RL Extremity Weakness; Unsteady   LL Extremity Weakness; Unsteady   Musculoskeletal Details   R Toes Deformity   L Toes Deformity   Urinary Catheter Rm   Placement Date/Time: 07/07/22 1405   Present on Admission/Arrival: No  Inserted by: Phuc Jenkins RN  2nd Staff Assisting: Anna Bird RN  Insertion attempts: 1  Catheter Type: Rm  Catheter Size: 18 FR  Catheter Balloon Size: 10 mL  Insertion Procedure Practi. ..    Catheter Indications Need for fluid volume management of the critically ill patient in a critical care setting
Goals   Discharge to home or other facility with appropriate resources Identify barriers to discharge with patient and caregiver;Arrange for needed discharge resources and transportation as appropriate
clipped to bed;Catheter secured to thigh; Tamper seal intact; Bag not on floor; Bag below bladder;Lack of dependent loop in tubing;Drainage bag less than half full   Status Draining;Patent   Care Plan - Discharge Planning Goals   Discharge to home or other facility with appropriate resources Identify barriers to discharge with patient and caregiver

## 2022-07-08 NOTE — CARE COORDINATION
Upon attempt to eval patient, he states he is leaving AMA and declines participation. D/C PT order d/t patient declines treatment.
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
## 2022-08-26 ENCOUNTER — OFFICE VISIT (OUTPATIENT)
Dept: PSYCHIATRY | Facility: CLINIC | Age: 57
End: 2022-08-26

## 2022-08-26 ENCOUNTER — HOSPITAL ENCOUNTER (EMERGENCY)
Facility: HOSPITAL | Age: 57
Discharge: HOME | End: 2022-08-26
Attending: EMERGENCY MEDICINE | Admitting: EMERGENCY MEDICINE

## 2022-08-26 VITALS
HEART RATE: 66 BPM | WEIGHT: 187 LBS | SYSTOLIC BLOOD PRESSURE: 170 MMHG | DIASTOLIC BLOOD PRESSURE: 102 MMHG | HEIGHT: 69 IN | BODY MASS INDEX: 27.7 KG/M2

## 2022-08-26 VITALS
HEART RATE: 64 BPM | OXYGEN SATURATION: 97 % | WEIGHT: 187 LBS | RESPIRATION RATE: 20 BRPM | TEMPERATURE: 98 F | HEIGHT: 71 IN | BODY MASS INDEX: 26.18 KG/M2 | SYSTOLIC BLOOD PRESSURE: 152 MMHG | DIASTOLIC BLOOD PRESSURE: 103 MMHG

## 2022-08-26 DIAGNOSIS — I10 HYPERTENSION, UNSPECIFIED TYPE: Primary | ICD-10-CM

## 2022-08-26 DIAGNOSIS — G47.09 OTHER INSOMNIA: ICD-10-CM

## 2022-08-26 DIAGNOSIS — F33.1 MODERATE EPISODE OF RECURRENT MAJOR DEPRESSIVE DISORDER: ICD-10-CM

## 2022-08-26 DIAGNOSIS — F41.1 GENERALIZED ANXIETY DISORDER: Primary | ICD-10-CM

## 2022-08-26 LAB
ALBUMIN SERPL-MCNC: 4.2 G/DL (ref 3.5–5.2)
ALBUMIN/GLOB SERPL: 1.4 G/DL
ALP SERPL-CCNC: 91 U/L (ref 39–117)
ALT SERPL W P-5'-P-CCNC: 85 U/L (ref 1–41)
ANION GAP SERPL CALCULATED.3IONS-SCNC: 9.6 MMOL/L (ref 5–15)
AST SERPL-CCNC: 102 U/L (ref 1–40)
BASOPHILS # BLD AUTO: 0.06 10*3/MM3 (ref 0–0.2)
BASOPHILS NFR BLD AUTO: 1 % (ref 0–1.5)
BILIRUB SERPL-MCNC: 0.5 MG/DL (ref 0–1.2)
BUN SERPL-MCNC: 14 MG/DL (ref 6–20)
BUN/CREAT SERPL: 19.7 (ref 7–25)
CALCIUM SPEC-SCNC: 9.4 MG/DL (ref 8.6–10.5)
CHLORIDE SERPL-SCNC: 107 MMOL/L (ref 98–107)
CO2 SERPL-SCNC: 23.4 MMOL/L (ref 22–29)
CREAT SERPL-MCNC: 0.71 MG/DL (ref 0.76–1.27)
DEPRECATED RDW RBC AUTO: 44.1 FL (ref 37–54)
EGFRCR SERPLBLD CKD-EPI 2021: 105.7 ML/MIN/1.73
EOSINOPHIL # BLD AUTO: 0.18 10*3/MM3 (ref 0–0.4)
EOSINOPHIL NFR BLD AUTO: 3 % (ref 0.3–6.2)
ERYTHROCYTE [DISTWIDTH] IN BLOOD BY AUTOMATED COUNT: 13.2 % (ref 12.3–15.4)
GLOBULIN UR ELPH-MCNC: 2.9 GM/DL
GLUCOSE SERPL-MCNC: 101 MG/DL (ref 65–99)
HCT VFR BLD AUTO: 41.4 % (ref 37.5–51)
HGB BLD-MCNC: 14.3 G/DL (ref 13–17.7)
IMM GRANULOCYTES # BLD AUTO: 0.02 10*3/MM3 (ref 0–0.05)
IMM GRANULOCYTES NFR BLD AUTO: 0.3 % (ref 0–0.5)
LYMPHOCYTES # BLD AUTO: 2 10*3/MM3 (ref 0.7–3.1)
LYMPHOCYTES NFR BLD AUTO: 32.8 % (ref 19.6–45.3)
MCH RBC QN AUTO: 31.6 PG (ref 26.6–33)
MCHC RBC AUTO-ENTMCNC: 34.5 G/DL (ref 31.5–35.7)
MCV RBC AUTO: 91.4 FL (ref 79–97)
MONOCYTES # BLD AUTO: 0.5 10*3/MM3 (ref 0.1–0.9)
MONOCYTES NFR BLD AUTO: 8.2 % (ref 5–12)
NEUTROPHILS NFR BLD AUTO: 3.33 10*3/MM3 (ref 1.7–7)
NEUTROPHILS NFR BLD AUTO: 54.7 % (ref 42.7–76)
NRBC BLD AUTO-RTO: 0 /100 WBC (ref 0–0.2)
PLATELET # BLD AUTO: 244 10*3/MM3 (ref 140–450)
PMV BLD AUTO: 10.9 FL (ref 6–12)
POTASSIUM SERPL-SCNC: 4.1 MMOL/L (ref 3.5–5.2)
PROT SERPL-MCNC: 7.1 G/DL (ref 6–8.5)
RBC # BLD AUTO: 4.53 10*6/MM3 (ref 4.14–5.8)
SODIUM SERPL-SCNC: 140 MMOL/L (ref 136–145)
WBC NRBC COR # BLD: 6.09 10*3/MM3 (ref 3.4–10.8)

## 2022-08-26 PROCEDURE — 99283 EMERGENCY DEPT VISIT LOW MDM: CPT

## 2022-08-26 PROCEDURE — 85025 COMPLETE CBC W/AUTO DIFF WBC: CPT

## 2022-08-26 PROCEDURE — 93005 ELECTROCARDIOGRAM TRACING: CPT

## 2022-08-26 PROCEDURE — 90792 PSYCH DIAG EVAL W/MED SRVCS: CPT | Performed by: NURSE PRACTITIONER

## 2022-08-26 PROCEDURE — 80053 COMPREHEN METABOLIC PANEL: CPT

## 2022-08-26 PROCEDURE — 36415 COLL VENOUS BLD VENIPUNCTURE: CPT

## 2022-08-26 RX ORDER — CETIRIZINE HYDROCHLORIDE 10 MG/1
TABLET ORAL
COMMUNITY
Start: 2022-08-19

## 2022-08-26 RX ORDER — CLONIDINE HYDROCHLORIDE 0.1 MG/1
0.1 TABLET ORAL 2 TIMES DAILY
Qty: 60 TABLET | Refills: 0 | Status: SHIPPED | OUTPATIENT
Start: 2022-08-26 | End: 2022-09-25

## 2022-08-26 RX ORDER — TIOTROPIUM BROMIDE 18 UG/1
CAPSULE ORAL; RESPIRATORY (INHALATION)
COMMUNITY
Start: 2022-08-19

## 2022-08-26 RX ORDER — LATANOPROST 50 UG/ML
SOLUTION/ DROPS OPHTHALMIC
COMMUNITY
Start: 2022-08-23

## 2022-08-26 RX ORDER — ASPIRIN 81 MG/1
1 TABLET ORAL DAILY
COMMUNITY
Start: 2022-04-09

## 2022-08-26 RX ORDER — CLONIDINE HYDROCHLORIDE 0.1 MG/1
0.1 TABLET ORAL ONCE
Status: COMPLETED | OUTPATIENT
Start: 2022-08-26 | End: 2022-08-26

## 2022-08-26 RX ORDER — BUDESONIDE AND FORMOTEROL FUMARATE DIHYDRATE 160; 4.5 UG/1; UG/1
AEROSOL RESPIRATORY (INHALATION)
COMMUNITY
Start: 2022-08-19

## 2022-08-26 RX ORDER — LISINOPRIL 30 MG/1
TABLET ORAL
COMMUNITY
Start: 2022-07-22

## 2022-08-26 RX ORDER — GABAPENTIN 300 MG/1
300 CAPSULE ORAL 3 TIMES DAILY
COMMUNITY
Start: 2022-08-24

## 2022-08-26 RX ORDER — ALBUTEROL SULFATE 90 UG/1
AEROSOL, METERED RESPIRATORY (INHALATION)
COMMUNITY
Start: 2022-08-19

## 2022-08-26 RX ORDER — NITROGLYCERIN 0.4 MG/1
0.4 TABLET SUBLINGUAL
COMMUNITY
Start: 2022-04-09

## 2022-08-26 RX ORDER — QUETIAPINE FUMARATE 25 MG/1
25 TABLET, FILM COATED ORAL NIGHTLY
Qty: 30 TABLET | Refills: 1 | Status: SHIPPED | OUTPATIENT
Start: 2022-08-26 | End: 2022-10-25

## 2022-08-26 RX ADMIN — CLONIDINE HYDROCHLORIDE 0.1 MG: 0.1 TABLET ORAL at 11:51

## 2022-09-01 ENCOUNTER — APPOINTMENT (OUTPATIENT)
Dept: GENERAL RADIOLOGY | Facility: HOSPITAL | Age: 57
End: 2022-09-01

## 2022-09-01 ENCOUNTER — APPOINTMENT (OUTPATIENT)
Dept: CT IMAGING | Facility: HOSPITAL | Age: 57
End: 2022-09-01

## 2022-09-01 ENCOUNTER — HOSPITAL ENCOUNTER (INPATIENT)
Facility: HOSPITAL | Age: 57
LOS: 1 days | Discharge: LEFT BEFORE DISCHARGE | End: 2022-09-02
Attending: EMERGENCY MEDICINE | Admitting: FAMILY MEDICINE

## 2022-09-01 DIAGNOSIS — M62.82 NON-TRAUMATIC RHABDOMYOLYSIS: ICD-10-CM

## 2022-09-01 DIAGNOSIS — S93.602A FOOT SPRAIN, LEFT, INITIAL ENCOUNTER: ICD-10-CM
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
## 2022-09-01 DIAGNOSIS — I95.9 TRANSIENT HYPOTENSION: ICD-10-CM

## 2022-09-01 DIAGNOSIS — N17.9 AKI (ACUTE KIDNEY INJURY): Primary | ICD-10-CM

## 2022-09-01 LAB
ABO GROUP BLD: NORMAL
ABO GROUP BLD: NORMAL
ADV 40+41 DNA STL QL NAA+NON-PROBE: NOT DETECTED
ALBUMIN SERPL-MCNC: 4.4 G/DL (ref 3.5–5.2)
ALBUMIN/GLOB SERPL: 1.2 G/DL
ALP SERPL-CCNC: 110 U/L (ref 39–117)
ALT SERPL W P-5'-P-CCNC: 112 U/L (ref 1–41)
AMPHET+METHAMPHET UR QL: POSITIVE
AMPHETAMINES UR QL: POSITIVE
ANION GAP SERPL CALCULATED.3IONS-SCNC: 18.1 MMOL/L (ref 5–15)
ANISOCYTOSIS BLD QL: NORMAL
APTT PPP: 38.4 SECONDS (ref 23.5–35.5)
AST SERPL-CCNC: 426 U/L (ref 1–40)
ASTRO TYP 1-8 RNA STL QL NAA+NON-PROBE: NOT DETECTED
BACTERIA UR QL AUTO: ABNORMAL /HPF
BARBITURATES UR QL SCN: NEGATIVE
BASOPHILS # BLD AUTO: 0.03 10*3/MM3 (ref 0–0.2)
BASOPHILS NFR BLD AUTO: 0.2 % (ref 0–1.5)
BENZODIAZ UR QL SCN: POSITIVE
BILIRUB SERPL-MCNC: 1.5 MG/DL (ref 0–1.2)
BILIRUB UR QL STRIP: ABNORMAL
BLD GP AB SCN SERPL QL: NEGATIVE
BUN SERPL-MCNC: 37 MG/DL (ref 6–20)
BUN/CREAT SERPL: 10.7 (ref 7–25)
BUPRENORPHINE SERPL-MCNC: NEGATIVE NG/ML
C CAYETANENSIS DNA STL QL NAA+NON-PROBE: NOT DETECTED
C COLI+JEJ+UPSA DNA STL QL NAA+NON-PROBE: NOT DETECTED
CALCIUM SPEC-SCNC: 10.2 MG/DL (ref 8.6–10.5)
CANNABINOIDS SERPL QL: NEGATIVE
CHLORIDE SERPL-SCNC: 107 MMOL/L (ref 98–107)
CK SERPL-CCNC: ABNORMAL U/L (ref 20–200)
CLARITY UR: ABNORMAL
CO2 SERPL-SCNC: 16.9 MMOL/L (ref 22–29)
COCAINE UR QL: NEGATIVE
COLOR UR: ABNORMAL
CREAT SERPL-MCNC: 3.46 MG/DL (ref 0.76–1.27)
CRYPTOSP DNA STL QL NAA+NON-PROBE: NOT DETECTED
D-LACTATE SERPL-SCNC: 1.5 MMOL/L (ref 0.5–2)
DEPRECATED RDW RBC AUTO: 46.2 FL (ref 37–54)
E HISTOLYT DNA STL QL NAA+NON-PROBE: NOT DETECTED
EAEC PAA PLAS AGGR+AATA ST NAA+NON-PRB: NOT DETECTED
EC STX1+STX2 GENES STL QL NAA+NON-PROBE: NOT DETECTED
EGFRCR SERPLBLD CKD-EPI 2021: 19.5 ML/MIN/1.73
EOSINOPHIL # BLD AUTO: 2.38 10*3/MM3 (ref 0–0.4)
EOSINOPHIL NFR BLD AUTO: 12.9 % (ref 0.3–6.2)
EPEC EAE GENE STL QL NAA+NON-PROBE: NOT DETECTED
ERYTHROCYTE [DISTWIDTH] IN BLOOD BY AUTOMATED COUNT: 13.8 % (ref 12.3–15.4)
ETEC LTA+ST1A+ST1B TOX ST NAA+NON-PROBE: NOT DETECTED
ETHANOL BLD-MCNC: <10 MG/DL (ref 0–10)
ETHANOL UR QL: <0.01 %
G LAMBLIA DNA STL QL NAA+NON-PROBE: NOT DETECTED
GLOBULIN UR ELPH-MCNC: 3.6 GM/DL
GLUCOSE SERPL-MCNC: 144 MG/DL (ref 65–99)
GLUCOSE UR STRIP-MCNC: NEGATIVE MG/DL
HCT VFR BLD AUTO: 45.8 % (ref 37.5–51)
HEMOCCULT STL QL: POSITIVE
HGB BLD-MCNC: 15.9 G/DL (ref 13–17.7)
HGB UR QL STRIP.AUTO: ABNORMAL
HOLD SPECIMEN: NORMAL
HOLD SPECIMEN: NORMAL
HYALINE CASTS UR QL AUTO: ABNORMAL /LPF
IMM GRANULOCYTES # BLD AUTO: 0.07 10*3/MM3 (ref 0–0.05)
IMM GRANULOCYTES NFR BLD AUTO: 0.4 % (ref 0–0.5)
INR PPP: 1 (ref 0.9–1.1)
KETONES UR QL STRIP: ABNORMAL
LEUKOCYTE ESTERASE UR QL STRIP.AUTO: ABNORMAL
LYMPHOCYTES # BLD AUTO: 1.44 10*3/MM3 (ref 0.7–3.1)
LYMPHOCYTES NFR BLD AUTO: 7.8 % (ref 19.6–45.3)
MCH RBC QN AUTO: 31.9 PG (ref 26.6–33)
MCHC RBC AUTO-ENTMCNC: 34.7 G/DL (ref 31.5–35.7)
MCV RBC AUTO: 92 FL (ref 79–97)
METHADONE UR QL SCN: NEGATIVE
MONOCYTES # BLD AUTO: 1.92 10*3/MM3 (ref 0.1–0.9)
MONOCYTES NFR BLD AUTO: 10.4 % (ref 5–12)
MUCOUS THREADS URNS QL MICRO: ABNORMAL /HPF
NEUTROPHILS NFR BLD AUTO: 12.59 10*3/MM3 (ref 1.7–7)
NEUTROPHILS NFR BLD AUTO: 68.3 % (ref 42.7–76)
NITRITE UR QL STRIP: NEGATIVE
NOROVIRUS GI+II RNA STL QL NAA+NON-PROBE: NOT DETECTED
NRBC BLD AUTO-RTO: 0 /100 WBC (ref 0–0.2)
OPIATES UR QL: POSITIVE
OXYCODONE UR QL SCN: NEGATIVE
P SHIGELLOIDES DNA STL QL NAA+NON-PROBE: NOT DETECTED
PCP UR QL SCN: NEGATIVE
PH UR STRIP.AUTO: <=5 [PH] (ref 5–8)
PLATELET # BLD AUTO: 298 10*3/MM3 (ref 140–450)
PMV BLD AUTO: 11.3 FL (ref 6–12)
POTASSIUM SERPL-SCNC: 4.2 MMOL/L (ref 3.5–5.2)
PROCALCITONIN SERPL-MCNC: 1.08 NG/ML (ref 0–0.25)
PROPOXYPH UR QL: NEGATIVE
PROT SERPL-MCNC: 8 G/DL (ref 6–8.5)
PROT UR QL STRIP: ABNORMAL
PROTHROMBIN TIME: 13.5 SECONDS (ref 12.5–14.5)
RBC # BLD AUTO: 4.98 10*6/MM3 (ref 4.14–5.8)
RBC # UR STRIP: ABNORMAL /HPF
REF LAB TEST METHOD: ABNORMAL
RH BLD: NEGATIVE
RH BLD: NEGATIVE
RVA RNA STL QL NAA+NON-PROBE: NOT DETECTED
S ENT+BONG DNA STL QL NAA+NON-PROBE: NOT DETECTED
SAPO I+II+IV+V RNA STL QL NAA+NON-PROBE: NOT DETECTED
SARS-COV-2 RNA PNL SPEC NAA+PROBE: NOT DETECTED
SHIGELLA SP+EIEC IPAH ST NAA+NON-PROBE: NOT DETECTED
SMALL PLATELETS BLD QL SMEAR: ADEQUATE
SODIUM SERPL-SCNC: 142 MMOL/L (ref 136–145)
SP GR UR STRIP: >=1.03 (ref 1–1.03)
SQUAMOUS #/AREA URNS HPF: ABNORMAL /HPF
T&S EXPIRATION DATE: NORMAL
TRICYCLICS UR QL SCN: NEGATIVE
UROBILINOGEN UR QL STRIP: ABNORMAL
V CHOL+PARA+VUL DNA STL QL NAA+NON-PROBE: NOT DETECTED
V CHOLERAE DNA STL QL NAA+NON-PROBE: NOT DETECTED
WBC # UR STRIP: ABNORMAL /HPF
WBC MORPH BLD: NORMAL
WBC NRBC COR # BLD: 18.43 10*3/MM3 (ref 3.4–10.8)
WHOLE BLOOD HOLD COAG: NORMAL
WHOLE BLOOD HOLD SPECIMEN: NORMAL
Y ENTEROCOL DNA STL QL NAA+NON-PROBE: NOT DETECTED

## 2022-09-01 PROCEDURE — 83874 ASSAY OF MYOGLOBIN: CPT | Performed by: EMERGENCY MEDICINE

## 2022-09-01 PROCEDURE — 71045 X-RAY EXAM CHEST 1 VIEW: CPT

## 2022-09-01 PROCEDURE — 80053 COMPREHEN METABOLIC PANEL: CPT | Performed by: EMERGENCY MEDICINE

## 2022-09-01 PROCEDURE — 82550 ASSAY OF CK (CPK): CPT | Performed by: EMERGENCY MEDICINE

## 2022-09-01 PROCEDURE — 80306 DRUG TEST PRSMV INSTRMNT: CPT | Performed by: FAMILY MEDICINE

## 2022-09-01 PROCEDURE — 86900 BLOOD TYPING SEROLOGIC ABO: CPT | Performed by: EMERGENCY MEDICINE

## 2022-09-01 PROCEDURE — 87040 BLOOD CULTURE FOR BACTERIA: CPT | Performed by: FAMILY MEDICINE

## 2022-09-01 PROCEDURE — 85007 BL SMEAR W/DIFF WBC COUNT: CPT | Performed by: EMERGENCY MEDICINE

## 2022-09-01 PROCEDURE — 25010000002 CEFTRIAXONE SODIUM-DEXTROSE 1-3.74 GM-%(50ML) RECONSTITUTED SOLUTION: Performed by: FAMILY MEDICINE

## 2022-09-01 PROCEDURE — 72131 CT LUMBAR SPINE W/O DYE: CPT

## 2022-09-01 PROCEDURE — 85730 THROMBOPLASTIN TIME PARTIAL: CPT | Performed by: EMERGENCY MEDICINE

## 2022-09-01 PROCEDURE — 85025 COMPLETE CBC W/AUTO DIFF WBC: CPT | Performed by: EMERGENCY MEDICINE

## 2022-09-01 PROCEDURE — 70450 CT HEAD/BRAIN W/O DYE: CPT

## 2022-09-01 PROCEDURE — 25010000002 IOPAMIDOL 61 % SOLUTION: Performed by: EMERGENCY MEDICINE

## 2022-09-01 PROCEDURE — 71260 CT THORAX DX C+: CPT

## 2022-09-01 PROCEDURE — 86901 BLOOD TYPING SEROLOGIC RH(D): CPT | Performed by: EMERGENCY MEDICINE

## 2022-09-01 PROCEDURE — 86900 BLOOD TYPING SEROLOGIC ABO: CPT

## 2022-09-01 PROCEDURE — 73630 X-RAY EXAM OF FOOT: CPT

## 2022-09-01 PROCEDURE — 72128 CT CHEST SPINE W/O DYE: CPT

## 2022-09-01 PROCEDURE — 86901 BLOOD TYPING SEROLOGIC RH(D): CPT

## 2022-09-01 PROCEDURE — 82077 ASSAY SPEC XCP UR&BREATH IA: CPT | Performed by: EMERGENCY MEDICINE

## 2022-09-01 PROCEDURE — 99223 1ST HOSP IP/OBS HIGH 75: CPT | Performed by: FAMILY MEDICINE

## 2022-09-01 PROCEDURE — 86850 RBC ANTIBODY SCREEN: CPT | Performed by: EMERGENCY MEDICINE

## 2022-09-01 PROCEDURE — 25010000002 LORAZEPAM PER 2 MG: Performed by: FAMILY MEDICINE

## 2022-09-01 PROCEDURE — 99285 EMERGENCY DEPT VISIT HI MDM: CPT

## 2022-09-01 PROCEDURE — 82272 OCCULT BLD FECES 1-3 TESTS: CPT | Performed by: INTERNAL MEDICINE

## 2022-09-01 PROCEDURE — 83605 ASSAY OF LACTIC ACID: CPT | Performed by: FAMILY MEDICINE

## 2022-09-01 PROCEDURE — 72125 CT NECK SPINE W/O DYE: CPT

## 2022-09-01 PROCEDURE — 85610 PROTHROMBIN TIME: CPT | Performed by: EMERGENCY MEDICINE

## 2022-09-01 PROCEDURE — 84145 PROCALCITONIN (PCT): CPT | Performed by: FAMILY MEDICINE

## 2022-09-01 PROCEDURE — 72170 X-RAY EXAM OF PELVIS: CPT

## 2022-09-01 PROCEDURE — 81001 URINALYSIS AUTO W/SCOPE: CPT | Performed by: FAMILY MEDICINE

## 2022-09-01 PROCEDURE — 87635 SARS-COV-2 COVID-19 AMP PRB: CPT | Performed by: FAMILY MEDICINE

## 2022-09-01 PROCEDURE — 87507 IADNA-DNA/RNA PROBE TQ 12-25: CPT | Performed by: INTERNAL MEDICINE

## 2022-09-01 PROCEDURE — 74177 CT ABD & PELVIS W/CONTRAST: CPT

## 2022-09-01 RX ORDER — LORAZEPAM 2 MG/ML
2 INJECTION INTRAMUSCULAR
Status: DISCONTINUED | OUTPATIENT
Start: 2022-09-01 | End: 2022-09-02 | Stop reason: HOSPADM

## 2022-09-01 RX ORDER — NICOTINE 21 MG/24HR
1 PATCH, TRANSDERMAL 24 HOURS TRANSDERMAL
Status: DISCONTINUED | OUTPATIENT
Start: 2022-09-01 | End: 2022-09-02 | Stop reason: HOSPADM

## 2022-09-01 RX ORDER — SODIUM CHLORIDE 0.9 % (FLUSH) 0.9 %
10 SYRINGE (ML) INJECTION AS NEEDED
Status: DISCONTINUED | OUTPATIENT
Start: 2022-09-01 | End: 2022-09-02 | Stop reason: HOSPADM

## 2022-09-01 RX ORDER — CEFTRIAXONE 1 G/50ML
1 INJECTION, SOLUTION INTRAVENOUS
Status: DISCONTINUED | OUTPATIENT
Start: 2022-09-01 | End: 2022-09-02 | Stop reason: HOSPADM

## 2022-09-01 RX ORDER — LORAZEPAM 2 MG/1
2 TABLET ORAL
Status: DISCONTINUED | OUTPATIENT
Start: 2022-09-01 | End: 2022-09-02 | Stop reason: HOSPADM

## 2022-09-01 RX ORDER — BUDESONIDE AND FORMOTEROL FUMARATE DIHYDRATE 160; 4.5 UG/1; UG/1
2 AEROSOL RESPIRATORY (INHALATION)
Status: DISCONTINUED | OUTPATIENT
Start: 2022-09-01 | End: 2022-09-02 | Stop reason: HOSPADM

## 2022-09-01 RX ORDER — LORAZEPAM 0.5 MG/1
1 TABLET ORAL
Status: DISCONTINUED | OUTPATIENT
Start: 2022-09-01 | End: 2022-09-02 | Stop reason: HOSPADM

## 2022-09-01 RX ORDER — SODIUM CHLORIDE 0.9 % (FLUSH) 0.9 %
10 SYRINGE (ML) INJECTION EVERY 12 HOURS SCHEDULED
Status: DISCONTINUED | OUTPATIENT
Start: 2022-09-01 | End: 2022-09-02 | Stop reason: HOSPADM

## 2022-09-01 RX ORDER — ONDANSETRON 2 MG/ML
4 INJECTION INTRAMUSCULAR; INTRAVENOUS EVERY 6 HOURS PRN
Status: DISCONTINUED | OUTPATIENT
Start: 2022-09-01 | End: 2022-09-02 | Stop reason: HOSPADM

## 2022-09-01 RX ORDER — LORAZEPAM 2 MG/ML
1 INJECTION INTRAMUSCULAR
Status: DISCONTINUED | OUTPATIENT
Start: 2022-09-01 | End: 2022-09-02 | Stop reason: HOSPADM

## 2022-09-01 RX ORDER — LATANOPROST 50 UG/ML
1 SOLUTION/ DROPS OPHTHALMIC NIGHTLY
Status: DISCONTINUED | OUTPATIENT
Start: 2022-09-01 | End: 2022-09-02 | Stop reason: HOSPADM

## 2022-09-01 RX ORDER — SODIUM CHLORIDE 9 MG/ML
100 INJECTION, SOLUTION INTRAVENOUS CONTINUOUS
Status: DISCONTINUED | OUTPATIENT
Start: 2022-09-01 | End: 2022-09-01

## 2022-09-01 RX ORDER — ONDANSETRON 4 MG/1
4 TABLET, FILM COATED ORAL EVERY 6 HOURS PRN
Status: DISCONTINUED | OUTPATIENT
Start: 2022-09-01 | End: 2022-09-02 | Stop reason: HOSPADM

## 2022-09-01 RX ADMIN — SODIUM BICARBONATE: 84 INJECTION, SOLUTION INTRAVENOUS at 22:22

## 2022-09-01 RX ADMIN — IOPAMIDOL 100 ML: 612 INJECTION, SOLUTION INTRAVENOUS at 15:02

## 2022-09-01 RX ADMIN — LORAZEPAM 2 MG: 2 INJECTION INTRAMUSCULAR; INTRAVENOUS at 23:25

## 2022-09-01 RX ADMIN — LORAZEPAM 2 MG: 2 INJECTION INTRAMUSCULAR; INTRAVENOUS at 20:55

## 2022-09-01 RX ADMIN — CEFTRIAXONE 1 G: 1 INJECTION, SOLUTION INTRAVENOUS at 18:44

## 2022-09-01 RX ADMIN — LORAZEPAM 2 MG: 2 INJECTION INTRAMUSCULAR; INTRAVENOUS at 22:21

## 2022-09-01 RX ADMIN — SODIUM CHLORIDE 1000 ML: 9 INJECTION, SOLUTION INTRAVENOUS at 14:12

## 2022-09-01 RX ADMIN — SODIUM CHLORIDE, POTASSIUM CHLORIDE, SODIUM LACTATE AND CALCIUM CHLORIDE 1000 ML: 600; 310; 30; 20 INJECTION, SOLUTION INTRAVENOUS at 17:01

## 2022-09-01 RX ADMIN — SODIUM CHLORIDE 100 ML/HR: 9 INJECTION, SOLUTION INTRAVENOUS at 18:44

## 2022-09-01 NOTE — ED NOTES
Transport here to take patient to room 427 at this time. All personal belongings with patient at time of departure from ED.
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
## 2022-09-01 NOTE — ED PROVIDER NOTES
Subjective   History of Present Illness    Review of Systems    Past Medical History:   Diagnosis Date   • Arthritis    • Charcot Leigh Tooth muscular atrophy    • Colon polyps     REPORTS HISTORY   • COPD (chronic obstructive pulmonary disease) ([facility])    • Depression    • Difficulty reading    • Difficulty writing    • High cholesterol    • History of drug use     PATIENT REPORTS NO USE FOR OVER 15 YEARS   • Hypertension    • Mobility impaired     REPORTS WALKS VERY LITTLE.  REPORTS CAN'T FEEL HANDS AND FEET DUR TO CHARCOT LEIGH TOOTH.  USES A WHEELCHAIR AND CAN TRANSFER FROM CHAIR TO CHAIR.     • Muscular dystrophies ([facility])    • Stroke ([facility])     PATIENT REPORTS QUESTIONABLE EPISODE 6-7 YEARS AGO AND THAT HE WAS FLOWN FROM [place] TO [place].   • Wears glasses    • Wears partial dentures     UPPER PLATE       No Known Allergies    Past Surgical History:   Procedure Laterality Date   • COLONOSCOPY N/A 9/12/2017    Procedure: COLONOSCOPY WITH POLYPECTOMY;  Surgeon: Geo Shannon MD;  Location: [facility] ENDOSCOPY;  Service:    • COLONOSCOPY W/ POLYPECTOMY     • ENDOSCOPY     • FRACTURE SURGERY Right     REPORTS HARDWARE IN PLACE   • FRACTURE SURGERY Bilateral     ANKLES, REPORTS HARDWARE IN PLACE IN BILATERAL ANKLES   • HERNIA REPAIR     • MOUTH SURGERY      EXTRACTIONS   • TONSILLECTOMY         Family History   Problem Relation Age of Onset   • Heart disease Father    • Cancer Father        Social History     Socioeconomic History   • Marital status:    Tobacco Use   • Smoking status: Current Some Day Smoker     Packs/day: 0.25     Years: 45.00     Pack years: 11.25     Types: Cigarettes   • Smokeless tobacco: Never Used   Vaping Use   • Vaping Use: Never used   Substance and Sexual Activity   • Alcohol use: No     Comment: REPORTS NO USE IN OVER 15 YEARS   • Drug use: No   • Sexual activity: Defer           Objective   Physical Exam    Procedures           ED Course                                            MDM    Final diagnoses:   None       ED Disposition  ED Disposition     None          No follow-up provider specified.       Medication List      No changes were made to your prescriptions during this visit.

## 2022-09-01 NOTE — ED NOTES
Pt states that he is unable to provide a urine sample at this time. Pt refusing straight cath at this time.

## 2022-09-01 NOTE — PLAN OF CARE
Goal Outcome Evaluation:      Pt admitted to room 427, vss, respirations even and unlabored on room air. Call light in reach.
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
## 2022-09-01 NOTE — ED NOTES
At this time, Dr. Oro requested to speak to [facility]. Dr. Rios transferred to Dr. Oro at this time.

## 2022-09-01 NOTE — ED NOTES
Pt given urinal and asked to provide a urine sample at this time. Pt states that he is unable to urinate and would attempt later.
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
## 2022-09-01 NOTE — PHARMACY RECOMMENDATION
"Pharmacy Consult-Vancomycin Dosing  Gene Lawton is a  57 y.o. male receiving vancomycin therapy.     Indication: sepsis  Consulting Provider: Dr. IGNACIO Chun    Goal Trough: 15 to 20 mcg/mL    Current Antimicrobial Therapy  Anti-Infectives (From admission, onward)    Ordered     Dose/Rate Route Frequency Start Stop    09/01/22 1806  vancomycin 1500 mg/500 mL 0.9% NS IVPB ([facility])        Ordering Provider: Geraldine Chun DO    1,500 mg  333.3 mL/hr over 1.5 Hours Intravenous Once 09/01/22 1900      09/01/22 1738  cefTRIAXone (ROCEPHIN) IVPB 1 g/50ml dextrose (premix)        Ordering Provider: Geraldine Chun DO    1 g  100 mL/hr over 30 Minutes Intravenous Every 24 Hours Scheduled 09/01/22 1830 09/06/22 1759    09/01/22 1738  Pharmacy to dose vancomycin        Ordering Provider: Geraldine Chun DO     Does not apply Continuous PRN 09/01/22 1738 09/06/22 1737          Allergies  Allergies as of 09/01/2022   • (No Known Allergies)       Labs    Results from last 7 days   Lab Units 09/01/22  1403 08/26/22  1139   BUN mg/dL 37* 14   CREATININE mg/dL 3.46* 0.71*       Results from last 7 days   Lab Units 09/01/22  1403 08/26/22  1139   WBC 10*3/mm3 18.43* 6.09       Evaluation of Dosing     Last Dose Received in the ED/Outside Facility: n/a  Is Patient on Dialysis or Renal Replacement: no (acute kidney injury)    Ht - 183 cm (72.05\")  Wt - 79.9 kg (176 lb 2.4 oz)    Estimated Creatinine Clearance: 26 mL/min (A) (by C-G formula based on SCr of 3.46 mg/dL (H)).    Intake & Output (last 3 days)       08/29 0701 08/30 0700 08/30 0701 08/31 0700 08/31 0701 09/01 0700 09/01 0701 09/02 0700    IV Piggyback    1000    Total Intake(mL/kg)    1000 (12.5)    Net    +1000                  Microbiology and Radiology  Microbiology Results (last 10 days)     Procedure Component Value - Date/Time    COVID PRE-OP / PRE-PROCEDURE SCREENING ORDER (NO ISOLATION) - Swab, Nasal Cavity [[id number]]  (Normal) " Collected: 09/01/22 1702    Lab Status: Final result Specimen: Swab from Nasal Cavity Updated: 09/01/22 1739    Narrative:      The following orders were created for panel order COVID PRE-OP / PRE-PROCEDURE SCREENING ORDER (NO ISOLATION) - Swab, Nasal Cavity.  Procedure                               Abnormality         Status                     ---------                               -----------         ------                     COVID-19,[facility]...[[id number]]  Normal              Final result                 Please view results for these tests on the individual orders.    COVID-19,[facility] IN-HOUSE,Nasal Swab No Transport Media 3-4 HR TAT - Swab, Nasal Cavity [[id number]]  (Normal) Collected: 09/01/22 1702    Lab Status: Final result Specimen: Swab from Nasal Cavity Updated: 09/01/22 1739     COVID19 Not Detected    Narrative:      Fact sheet for providers: https://www.fda.gov/media/984472/download     Fact sheet for patients: https://www.fda.gov/media/064623/download    Test performed by PCR.    Consider negative results in combination with clinical observations, patient history, and epidemiological information.          Vancomycin Levels:                      Assessment/Plan    • Pharmacy to dose vancomycin for sepsis. Goal trough 15 to 20 mcg/mL.  • Patient currently receiving vancomycin dosed per level. Will order vancomycin 1500 mg IV for one dose.  • Assess clearance by vancomycin random level on 9/2 at 0600.  • Pharmacy will continue to monitor renal function, cultures and sensitivities, and clinical status to adjust regimen as necessary.    Thank you for the opportunity to consult on this patient.    Jeovany Guy, Pharm.D.  09/01/22  18:27 EDT

## 2022-09-02 VITALS
TEMPERATURE: 98.4 F | SYSTOLIC BLOOD PRESSURE: 116 MMHG | OXYGEN SATURATION: 94 % | HEIGHT: 72 IN | RESPIRATION RATE: 18 BRPM | DIASTOLIC BLOOD PRESSURE: 72 MMHG | BODY MASS INDEX: 23.59 KG/M2 | WEIGHT: 174.16 LBS | HEART RATE: 96 BPM

## 2022-09-02 LAB
ALBUMIN SERPL-MCNC: 3.5 G/DL (ref 3.5–5.2)
ALBUMIN/GLOB SERPL: 1.3 G/DL
ALP SERPL-CCNC: 82 U/L (ref 39–117)
ALT SERPL W P-5'-P-CCNC: 87 U/L (ref 1–41)
ANION GAP SERPL CALCULATED.3IONS-SCNC: 9.6 MMOL/L (ref 5–15)
AST SERPL-CCNC: 308 U/L (ref 1–40)
BILIRUB SERPL-MCNC: 1.1 MG/DL (ref 0–1.2)
BUN SERPL-MCNC: 34 MG/DL (ref 6–20)
BUN/CREAT SERPL: 19.9 (ref 7–25)
CALCIUM SPEC-SCNC: 8.7 MG/DL (ref 8.6–10.5)
CHLORIDE SERPL-SCNC: 110 MMOL/L (ref 98–107)
CK SERPL-CCNC: ABNORMAL U/L (ref 20–200)
CO2 SERPL-SCNC: 23.4 MMOL/L (ref 22–29)
CREAT SERPL-MCNC: 1.71 MG/DL (ref 0.76–1.27)
EGFRCR SERPLBLD CKD-EPI 2021: 45.5 ML/MIN/1.73
GLOBULIN UR ELPH-MCNC: 2.7 GM/DL
GLUCOSE SERPL-MCNC: 139 MG/DL (ref 65–99)
HCT VFR BLD AUTO: 40.5 % (ref 37.5–51)
HGB BLD-MCNC: 13.9 G/DL (ref 13–17.7)
PH UR STRIP.AUTO: 5.5 [PH] (ref 5–8)
PHOSPHATE SERPL-MCNC: 3.1 MG/DL (ref 2.5–4.5)
POTASSIUM SERPL-SCNC: 4.3 MMOL/L (ref 3.5–5.2)
PROT SERPL-MCNC: 6.2 G/DL (ref 6–8.5)
SODIUM SERPL-SCNC: 143 MMOL/L (ref 136–145)
VANCOMYCIN SERPL-MCNC: 27 MCG/ML (ref 5–40)

## 2022-09-02 PROCEDURE — 82550 ASSAY OF CK (CPK): CPT | Performed by: FAMILY MEDICINE

## 2022-09-02 PROCEDURE — 80053 COMPREHEN METABOLIC PANEL: CPT | Performed by: FAMILY MEDICINE

## 2022-09-02 PROCEDURE — 25010000002 CEFTRIAXONE SODIUM-DEXTROSE 1-3.74 GM-%(50ML) RECONSTITUTED SOLUTION: Performed by: FAMILY MEDICINE

## 2022-09-02 PROCEDURE — 25010000002 VANCOMYCIN 5 G RECONSTITUTED SOLUTION: Performed by: FAMILY MEDICINE

## 2022-09-02 PROCEDURE — 25010000002 ONDANSETRON PER 1 MG: Performed by: FAMILY MEDICINE

## 2022-09-02 PROCEDURE — 99238 HOSP IP/OBS DSCHRG MGMT 30/<: CPT | Performed by: INTERNAL MEDICINE

## 2022-09-02 PROCEDURE — 85014 HEMATOCRIT: CPT | Performed by: INTERNAL MEDICINE

## 2022-09-02 PROCEDURE — 84100 ASSAY OF PHOSPHORUS: CPT | Performed by: INTERNAL MEDICINE

## 2022-09-02 PROCEDURE — 85018 HEMOGLOBIN: CPT | Performed by: INTERNAL MEDICINE

## 2022-09-02 PROCEDURE — 25010000002 LORAZEPAM PER 2 MG: Performed by: FAMILY MEDICINE

## 2022-09-02 PROCEDURE — 80202 ASSAY OF VANCOMYCIN: CPT | Performed by: FAMILY MEDICINE

## 2022-09-02 PROCEDURE — 25010000002 MORPHINE PER 10 MG: Performed by: FAMILY MEDICINE

## 2022-09-02 PROCEDURE — 81003 URINALYSIS AUTO W/O SCOPE: CPT | Performed by: INTERNAL MEDICINE

## 2022-09-02 RX ORDER — GABAPENTIN 300 MG/1
300 CAPSULE ORAL EVERY 8 HOURS SCHEDULED
Status: DISCONTINUED | OUTPATIENT
Start: 2022-09-02 | End: 2022-09-02 | Stop reason: HOSPADM

## 2022-09-02 RX ORDER — ACETAMINOPHEN 325 MG/1
650 TABLET ORAL EVERY 6 HOURS PRN
Status: DISCONTINUED | OUTPATIENT
Start: 2022-09-02 | End: 2022-09-02 | Stop reason: HOSPADM

## 2022-09-02 RX ORDER — MORPHINE SULFATE 2 MG/ML
2 INJECTION, SOLUTION INTRAMUSCULAR; INTRAVENOUS EVERY 6 HOURS PRN
Status: DISCONTINUED | OUTPATIENT
Start: 2022-09-02 | End: 2022-09-02 | Stop reason: HOSPADM

## 2022-09-02 RX ORDER — HYDROCODONE BITARTRATE AND ACETAMINOPHEN 5; 325 MG/1; MG/1
1 TABLET ORAL EVERY 6 HOURS PRN
COMMUNITY

## 2022-09-02 RX ORDER — QUETIAPINE FUMARATE 25 MG/1
12.5 TABLET, FILM COATED ORAL NIGHTLY
Status: DISCONTINUED | OUTPATIENT
Start: 2022-09-02 | End: 2022-09-02 | Stop reason: HOSPADM

## 2022-09-02 RX ORDER — OXYCODONE HYDROCHLORIDE 5 MG/1
5 CAPSULE ORAL EVERY 6 HOURS PRN
COMMUNITY

## 2022-09-02 RX ORDER — OXYCODONE HYDROCHLORIDE 5 MG/1
5 TABLET ORAL EVERY 8 HOURS PRN
Status: DISCONTINUED | OUTPATIENT
Start: 2022-09-02 | End: 2022-09-02 | Stop reason: HOSPADM

## 2022-09-02 RX ADMIN — MORPHINE SULFATE 2 MG: 2 INJECTION, SOLUTION INTRAMUSCULAR; INTRAVENOUS at 18:43

## 2022-09-02 RX ADMIN — LORAZEPAM 2 MG: 2 INJECTION INTRAMUSCULAR; INTRAVENOUS at 04:27

## 2022-09-02 RX ADMIN — BUDESONIDE AND FORMOTEROL FUMARATE DIHYDRATE 2 PUFF: 160; 4.5 AEROSOL RESPIRATORY (INHALATION) at 09:19

## 2022-09-02 RX ADMIN — CEFTRIAXONE 1 G: 1 INJECTION, SOLUTION INTRAVENOUS at 17:26

## 2022-09-02 RX ADMIN — LORAZEPAM 2 MG: 2 INJECTION INTRAMUSCULAR; INTRAVENOUS at 05:19

## 2022-09-02 RX ADMIN — OXYCODONE HYDROCHLORIDE 5 MG: 5 TABLET ORAL at 13:51

## 2022-09-02 RX ADMIN — LORAZEPAM 2 MG: 2 TABLET ORAL at 14:29

## 2022-09-02 RX ADMIN — VANCOMYCIN HYDROCHLORIDE 1500 MG: 5 INJECTION, POWDER, LYOPHILIZED, FOR SOLUTION INTRAVENOUS at 01:17

## 2022-09-02 RX ADMIN — LORAZEPAM 2 MG: 2 INJECTION INTRAMUSCULAR; INTRAVENOUS at 03:38

## 2022-09-02 RX ADMIN — ONDANSETRON 4 MG: 2 INJECTION INTRAMUSCULAR; INTRAVENOUS at 18:00

## 2022-09-02 RX ADMIN — GABAPENTIN 300 MG: 300 CAPSULE ORAL at 17:28

## 2022-09-02 RX ADMIN — Medication 10 ML: at 08:31

## 2022-09-02 RX ADMIN — LORAZEPAM 2 MG: 2 TABLET ORAL at 09:18

## 2022-09-02 RX ADMIN — LORAZEPAM 2 MG: 2 TABLET ORAL at 17:29

## 2022-09-02 RX ADMIN — SODIUM BICARBONATE: 84 INJECTION, SOLUTION INTRAVENOUS at 13:37

## 2022-09-02 RX ADMIN — ACETAMINOPHEN 650 MG: 325 TABLET ORAL at 13:37
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
## 2022-09-02 NOTE — CASE MANAGEMENT/SOCIAL WORK
Case Management/Social Work    Patient Name:  Gene Lawton  YOB: 1965  MRN: [id number]  Admit Date:  9/1/2022        Pt not appropriate for DCP at this time. SW to follow up another time. SW following.       Electronically signed by:  NIKOLAS Cervantes  09/02/22 11:46 EDT

## 2022-09-02 NOTE — PROGRESS NOTES
"Pharmacy Consult-Vancomycin Dosing  Gene Lawton is a  57 y.o. male receiving vancomycin therapy.     Indication: Sepsis  Consulting Provider: Adiel    Goal Trough: 15-20 mcg/mL    Current Antimicrobial Therapy  Anti-Infectives (From admission, onward)      Ordered     Dose/Rate Route Frequency Start Stop    09/02/22 0822  Vancomycin Pharmacy Intermittent/Pulse Dosing        Ordering Provider: Geraldine Chun DO     Does not apply Daily 09/02/22 0915 09/07/22 0859    09/01/22 1806  vancomycin 1500 mg/500 mL 0.9% NS IVPB ([facility])        Ordering Provider: Geraldine Chun DO    1,500 mg  333.3 mL/hr over 1.5 Hours Intravenous Once 09/01/22 1900 09/02/22 0247    09/01/22 1738  cefTRIAXone (ROCEPHIN) IVPB 1 g/50ml dextrose (premix)        Ordering Provider: Geraldine Chun DO    1 g  100 mL/hr over 30 Minutes Intravenous Every 24 Hours Scheduled 09/01/22 1830 09/06/22 1759    09/01/22 1738  Pharmacy to dose vancomycin        Ordering Provider: Geraldine Chun DO     Does not apply Continuous PRN 09/01/22 1738 09/06/22 1737            Allergies  Allergies as of 09/01/2022    (No Known Allergies)       Labs    Results from last 7 days   Lab Units 09/02/22  0404 09/01/22  1403 08/26/22  1139   BUN mg/dL 34* 37* 14   CREATININE mg/dL 1.71* 3.46* 0.71*       Results from last 7 days   Lab Units 09/01/22  1403 08/26/22  1139   WBC 10*3/mm3 18.43* 6.09       Evaluation of Dosing     Last Dose Received in the ED/Outside Facility: Yes  Is Patient on Dialysis or Renal Replacement: No    Ht - 183 cm (72.05\")  Wt - 79 kg (174 lb 2.6 oz)    Estimated Creatinine Clearance: 52 mL/min (A) (by C-G formula based on SCr of 1.71 mg/dL (H)).    Intake & Output (last 3 days)         08/30 0701 08/31 0700 08/31 0701 09/01 0700 09/01 0701 09/02 0700 09/02 0701 09/03 0700    I.V. (mL/kg)   1503 (19)     IV Piggyback   1000     Total Intake(mL/kg)   2503 (31.7)     Urine (mL/kg/hr)   1000 550 (1.6)    Stool "   0     Total Output   1000 550    Net   +1503 -550            Stool Unmeasured Occurrence   2 x             Microbiology and Radiology  Microbiology Results (last 10 days)       Procedure Component Value - Date/Time    Gastrointestinal Panel, PCR - Stool, Per Rectum [[id number]]  (Normal) Collected: 09/01/22 2223    Lab Status: Final result Specimen: Stool from Per Rectum Updated: 09/01/22 2356     Campylobacter Not Detected     Plesiomonas shigelloides Not Detected     Salmonella Not Detected     Vibrio Not Detected     Vibrio cholerae Not Detected     Yersinia enterocolitica Not Detected     Enteroaggregative E. coli (EAEC) Not Detected     Enteropathogenic E. coli (EPEC) Not Detected     Enterotoxigenic E. coli (ETEC) lt/st Not Detected     Shiga-like toxin-producing E. coli (STEC) stx1/stx2 Not Detected     Shigella/Enteroinvasive E. coli (EIEC) Not Detected     Cryptosporidium Not Detected     Cyclospora cayetanensis Not Detected     Entamoeba histolytica Not Detected     Giardia lamblia Not Detected     Adenovirus F40/41 Not Detected     Astrovirus Not Detected     Norovirus GI/GII Not Detected     Rotavirus A Not Detected     Sapovirus (I, II, IV or V) Not Detected    COVID PRE-OP / PRE-PROCEDURE SCREENING ORDER (NO ISOLATION) - Swab, Nasal Cavity [[id number]]  (Normal) Collected: 09/01/22 1702    Lab Status: Final result Specimen: Swab from Nasal Cavity Updated: 09/01/22 1739    Narrative:      The following orders were created for panel order COVID PRE-OP / PRE-PROCEDURE SCREENING ORDER (NO ISOLATION) - Swab, Nasal Cavity.  Procedure                               Abnormality         Status                     ---------                               -----------         ------                     COVID-19,[facility]...[[id number]]  Normal              Final result                 Please view results for these tests on the individual orders.    COVID-19,[facility] IN-HOUSE,Nasal Swab No Transport Media 3-4 HR  TAT - Swab, Nasal Cavity [[id number]]  (Normal) Collected: 09/01/22 1702    Lab Status: Final result Specimen: Swab from Nasal Cavity Updated: 09/01/22 1739     COVID19 Not Detected    Narrative:      Fact sheet for providers: https://www.fda.gov/media/671409/download     Fact sheet for patients: https://www.fda.gov/media/804133/download    Test performed by PCR.    Consider negative results in combination with clinical observations, patient history, and epidemiological information.            Vancomycin Levels:    Results from last 7 days   Lab Units 09/02/22  0404   VANCOMYCIN RM mcg/mL 27.00                   Assessment/Plan    Pharmacy to dose vancomycin for sepsis. Goal trough 15-20 mcg/mL.  Patient currently receiving vancomycin dosed per level. Will order vancomycin 1000 mg for one dose as level is not expected to remain therapeutic without additional dose later today.  Assess clearance by vancomycin random level on 9/3 @ 0600  Pharmacy will continue to monitor renal function, cultures and sensitivities, and clinical status to adjust regimen as necessary.    Thank you for the consult,    Mary ChandlerD, BCPS   09/02/22 11:26 EDT

## 2022-09-02 NOTE — PROGRESS NOTES
"Adult Nutrition  Assessment/PES    Patient Name:  Gene Lawton  YOB: 1965  MRN: [id number]  Admit Date:  9/1/2022    Assessment Date:  9/2/2022    Comments:    1. Continue current diet order as medically appropriate.   2. Encourage PO intake to meet 50% of estimated needs.   3. RD to order Boost Plus BID.  4. Consider a MVI w/ minerals daily.   5. Monitor and replace electrolytes PRN.     RD to follow-up and available PRN.      Reason for Assessment     Row Name 09/02/22 0915          Reason for Assessment    Reason For Assessment identified at risk by screening criteria;per organizational policy     Diagnosis cardiac disease                  Labs/Tests/Procedures/Meds     Row Name 09/02/22 0915          Labs/Procedures/Meds    Lab Results Reviewed reviewed, pertinent     Lab Results Comments High: glucose, Cr, BUN, ALT            Medications    Pertinent Medications Reviewed reviewed, pertinent     Pertinent Medications Comments D5%                Physical Findings     Row Name 09/02/22 0918          Physical Findings    Overall Physical Appearance N/A                Estimated/Assessed Needs - Anthropometrics     Row Name 09/02/22 0920 09/02/22 0918       Anthropometrics    Height 183 cm (72.05\") --    Age for Calculations -- 57    Height for Calculation -- 1.83 m (6' 0.05\")    Weight for Calculation -- 78.9 kg (174 lb)       Estimated/Assessed Needs    Additional Documentation -- Estimated Calorie Needs (Group);KCAL/KG (Group);Protein Requirements (Group);Fluid Requirements (Group)       Estimated Calorie Needs    Estimated Calorie Requirement (kcal/day) -- 1973       KCAL/KG    KCAL/KG -- 20 Kcal/Kg (kcal);30 Kcal/Kg (kcal);25 Kcal/Kg (kcal)    20 Kcal/Kg (kcal) -- 1578.52    25 Kcal/Kg (kcal) -- 1973.15    30 Kcal/Kg (kcal) -- 2367.78       Protein Requirements    Weight Used For Protein Calculations -- 78.9 kg (174 lb)    Est Protein Requirement Amount (gms/kg) -- 0.8 gm protein    Estimated " "Protein Requirements (gms/day) -- 63.14       Fluid Requirements    Fluid Requirements (mL/day) -- 1973  1mL/kcal    RDA Method (mL) -- 1973               Nutrition Prescription Ordered     Row Name 09/02/22 0919          Nutrition Prescription PO    Current PO Diet Regular     Common Modifiers Renal                Evaluation of Received Nutrient/Fluid Intake     Row Name 09/02/22 0920          PO Evaluation    Number of Days PO Intake Evaluated Insufficient Data            Vitals    Height 183 cm (72.05\")                   Problem/Interventions:   Problem 1     Row Name 09/02/22 0920          Nutrition Diagnoses Problem 1    Problem 1 Increased Nutrient Needs     Macronutrient Kcal     Etiology (related to) Medical Diagnosis     Renal NIRMALA     Signs/Symptoms (evidenced by) Other (comment)  need for oral nutrition supplement to meet increased estimated needs.                      Intervention Goal     Row Name 09/02/22 0921          Intervention Goal    General Maintain nutrition;Improved nutrition related lab(s);Reduce/improve symptoms;Meet nutritional needs for age/condition;Disease management/therapy     PO Establish PO;Tolerate PO;Increase intake;Maintain intake;Meet estimated needs     Weight Maintain weight                Nutrition Intervention     Row Name 09/02/22 0921          Nutrition Intervention    RD/Tech Action Follow Tx progress;Care plan reviewd;Await begin PO;Encourage intake;Recommend/ordered     Recommended/Ordered Supplement                Nutrition Prescription     Row Name 09/02/22 0921          Nutrition Prescription PO    PO Prescription Begin/change supplement     Supplement Boost Plus     Supplement Frequency 2 times a day     New PO Prescription Ordered? Yes            Other Orders    Obtain Weight Daily     Obtain Weight Ordered? No, recommended     Supplement Vitamin mineral supplement     Supplement Ordered? No, recommended     Labs Mg++;Na+;K+     Labs Ordered? No, recommended     " Other Monitor and replace electrolytes PRN.                Education/Evaluation     Row Name 09/02/22 0927          Education    Education Will Instruct as appropriate            Monitor/Evaluation    Monitor Per protocol;PO intake;Supplement intake;Pertinent labs;Weight;Skin status;Symptoms                 Electronically signed by:  LISSY LOZOYA RD  09/02/22 09:27 EDT
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
## 2022-09-02 NOTE — PROGRESS NOTES
[facility]    Name:  Gene Lawton   Age:  57 y.o.  Sex:  male  :  1965  MRN:  [id number]   Visit Number:  [id number]  Admission Date:  2022  Date Of Service:  22  Primary Care Physician:  Bruno Hughes PA    Patient was seen and examined. Pertinent laboratory and radiology data were reviewed.  Called by the nursing staff stating that the patient has been very agitated and fidgety.  He also had a large bowel movement which was bright red in color.  He denies any abdominal pain or prior history of GI bleeding.  His hemoglobin on admission was 15.9.    Vital signs:    Vital Signs (last 24 hours)        0700   0659  2220   Most Recent      Temp (°F)   98.2 -  99.1     98.8 (37.1) 2100    Heart Rate   96 -  117     117 2100    Resp   18 -  20     20 2100    BP   94/77 -  153/92     131/86 2100    SpO2 (%)   92 -  100     94 2100        Patient will be transferred to telemetry.  He will be continued on alcohol withdrawal protocol.  He also is noted to have multiple positive urine drug screen including amphetamine, benzodiazepine, methamphetamine and opiates.    We will place him on IV Protonix since he has high risk for peptic ulcer disease.  We will repeat an H&H.  Discussed with nursing staff at the bedside.    Renan Murray MD  22  22:20 EDT    Dictated utilizing Dragon dictation.

## 2022-09-03 ENCOUNTER — HOSPITAL ENCOUNTER (EMERGENCY)
Facility: HOSPITAL | Age: 57
Discharge: HOME | End: 2022-09-03
Attending: EMERGENCY MEDICINE | Admitting: EMERGENCY MEDICINE

## 2022-09-03 ENCOUNTER — APPOINTMENT (OUTPATIENT)
Dept: CT IMAGING | Facility: HOSPITAL | Age: 57
End: 2022-09-03

## 2022-09-03 VITALS
OXYGEN SATURATION: 98 % | SYSTOLIC BLOOD PRESSURE: 131 MMHG | TEMPERATURE: 98.6 F | HEIGHT: 72 IN | BODY MASS INDEX: 23.57 KG/M2 | RESPIRATION RATE: 18 BRPM | WEIGHT: 174 LBS | DIASTOLIC BLOOD PRESSURE: 85 MMHG | HEART RATE: 82 BPM

## 2022-09-03 DIAGNOSIS — Z87.39 HISTORY OF RHABDOMYOLYSIS: ICD-10-CM

## 2022-09-03 DIAGNOSIS — R11.2 NAUSEA AND VOMITING, UNSPECIFIED VOMITING TYPE: Primary | ICD-10-CM

## 2022-09-03 DIAGNOSIS — K52.9 COLITIS: ICD-10-CM

## 2022-09-03 LAB
ALBUMIN SERPL-MCNC: 3.1 G/DL (ref 3.5–5.2)
ALBUMIN/GLOB SERPL: 1.1 G/DL
ALP SERPL-CCNC: 71 U/L (ref 39–117)
ALT SERPL W P-5'-P-CCNC: 99 U/L (ref 1–41)
AMPHET+METHAMPHET UR QL: NEGATIVE
AMPHETAMINES UR QL: NEGATIVE
ANION GAP SERPL CALCULATED.3IONS-SCNC: 6.1 MMOL/L (ref 5–15)
APTT PPP: 27.8 SECONDS (ref 23.5–35.5)
AST SERPL-CCNC: 358 U/L (ref 1–40)
BACTERIA UR QL AUTO: ABNORMAL /HPF
BARBITURATES UR QL SCN: NEGATIVE
BASOPHILS # BLD AUTO: 0.07 10*3/MM3 (ref 0–0.2)
BASOPHILS NFR BLD AUTO: 0.3 % (ref 0–1.5)
BENZODIAZ UR QL SCN: POSITIVE
BILIRUB SERPL-MCNC: 0.6 MG/DL (ref 0–1.2)
BILIRUB UR QL STRIP: NEGATIVE
BUN SERPL-MCNC: 21 MG/DL (ref 6–20)
BUN/CREAT SERPL: 25.6 (ref 7–25)
BUPRENORPHINE SERPL-MCNC: NEGATIVE NG/ML
CALCIUM SPEC-SCNC: 8.5 MG/DL (ref 8.6–10.5)
CANNABINOIDS SERPL QL: NEGATIVE
CHLORIDE SERPL-SCNC: 104 MMOL/L (ref 98–107)
CK SERPL-CCNC: ABNORMAL U/L (ref 20–200)
CLARITY UR: CLEAR
CO2 SERPL-SCNC: 27.9 MMOL/L (ref 22–29)
COCAINE UR QL: NEGATIVE
COLOR UR: YELLOW
CREAT SERPL-MCNC: 0.82 MG/DL (ref 0.76–1.27)
DEPRECATED RDW RBC AUTO: 48.4 FL (ref 37–54)
EGFRCR SERPLBLD CKD-EPI 2021: 101.2 ML/MIN/1.73
EOSINOPHIL # BLD AUTO: 0.16 10*3/MM3 (ref 0–0.4)
EOSINOPHIL NFR BLD AUTO: 0.8 % (ref 0.3–6.2)
ERYTHROCYTE [DISTWIDTH] IN BLOOD BY AUTOMATED COUNT: 14.1 % (ref 12.3–15.4)
ETHANOL BLD-MCNC: <10 MG/DL (ref 0–10)
ETHANOL UR QL: <0.01 %
GLOBULIN UR ELPH-MCNC: 2.7 GM/DL
GLUCOSE SERPL-MCNC: 108 MG/DL (ref 65–99)
GLUCOSE UR STRIP-MCNC: NEGATIVE MG/DL
HCT VFR BLD AUTO: 36.4 % (ref 37.5–51)
HGB BLD-MCNC: 12.5 G/DL (ref 13–17.7)
HGB UR QL STRIP.AUTO: NEGATIVE
HOLD SPECIMEN: NORMAL
HOLD SPECIMEN: NORMAL
HYALINE CASTS UR QL AUTO: ABNORMAL /LPF
IMM GRANULOCYTES # BLD AUTO: 0.13 10*3/MM3 (ref 0–0.05)
IMM GRANULOCYTES NFR BLD AUTO: 0.6 % (ref 0–0.5)
INR PPP: 1.09 (ref 0.9–1.1)
KETONES UR QL STRIP: NEGATIVE
LEUKOCYTE ESTERASE UR QL STRIP.AUTO: NEGATIVE
LIPASE SERPL-CCNC: 39 U/L (ref 13–60)
LYMPHOCYTES # BLD AUTO: 3.15 10*3/MM3 (ref 0.7–3.1)
LYMPHOCYTES NFR BLD AUTO: 15.6 % (ref 19.6–45.3)
MCH RBC QN AUTO: 32.1 PG (ref 26.6–33)
MCHC RBC AUTO-ENTMCNC: 34.3 G/DL (ref 31.5–35.7)
MCV RBC AUTO: 93.3 FL (ref 79–97)
METHADONE UR QL SCN: NEGATIVE
MONOCYTES # BLD AUTO: 1.73 10*3/MM3 (ref 0.1–0.9)
MONOCYTES NFR BLD AUTO: 8.6 % (ref 5–12)
MYOGLOBIN SERPL-MCNC: 656 NG/ML (ref 28–72)
NEUTROPHILS NFR BLD AUTO: 14.93 10*3/MM3 (ref 1.7–7)
NEUTROPHILS NFR BLD AUTO: 74.1 % (ref 42.7–76)
NITRITE UR QL STRIP: NEGATIVE
NRBC BLD AUTO-RTO: 0 /100 WBC (ref 0–0.2)
OPIATES UR QL: POSITIVE
OXYCODONE UR QL SCN: NEGATIVE
PCP UR QL SCN: NEGATIVE
PH UR STRIP.AUTO: 8.5 [PH] (ref 5–8)
PLATELET # BLD AUTO: 161 10*3/MM3 (ref 140–450)
PMV BLD AUTO: 12.1 FL (ref 6–12)
POTASSIUM SERPL-SCNC: 3.8 MMOL/L (ref 3.5–5.2)
PROPOXYPH UR QL: NEGATIVE
PROT SERPL-MCNC: 5.8 G/DL (ref 6–8.5)
PROT UR QL STRIP: ABNORMAL
PROTHROMBIN TIME: 14.4 SECONDS (ref 12.5–14.5)
RBC # BLD AUTO: 3.9 10*6/MM3 (ref 4.14–5.8)
RBC # UR STRIP: ABNORMAL /HPF
RBC MORPH BLD: NORMAL
REF LAB TEST METHOD: ABNORMAL
SMALL PLATELETS BLD QL SMEAR: ADEQUATE
SODIUM SERPL-SCNC: 138 MMOL/L (ref 136–145)
SP GR UR STRIP: 1.01 (ref 1–1.03)
SQUAMOUS #/AREA URNS HPF: ABNORMAL /HPF
TRICYCLICS UR QL SCN: NEGATIVE
UROBILINOGEN UR QL STRIP: ABNORMAL
WBC # UR STRIP: ABNORMAL /HPF
WBC MORPH BLD: NORMAL
WBC NRBC COR # BLD: 20.17 10*3/MM3 (ref 3.4–10.8)
WHOLE BLOOD HOLD COAG: NORMAL
WHOLE BLOOD HOLD SPECIMEN: NORMAL

## 2022-09-03 PROCEDURE — 85610 PROTHROMBIN TIME: CPT | Performed by: EMERGENCY MEDICINE

## 2022-09-03 PROCEDURE — 74176 CT ABD & PELVIS W/O CONTRAST: CPT

## 2022-09-03 PROCEDURE — 85025 COMPLETE CBC W/AUTO DIFF WBC: CPT | Performed by: EMERGENCY MEDICINE

## 2022-09-03 PROCEDURE — 80306 DRUG TEST PRSMV INSTRMNT: CPT | Performed by: EMERGENCY MEDICINE

## 2022-09-03 PROCEDURE — 25010000002 ONDANSETRON PER 1 MG: Performed by: EMERGENCY MEDICINE

## 2022-09-03 PROCEDURE — 85730 THROMBOPLASTIN TIME PARTIAL: CPT | Performed by: EMERGENCY MEDICINE

## 2022-09-03 PROCEDURE — 81001 URINALYSIS AUTO W/SCOPE: CPT | Performed by: EMERGENCY MEDICINE

## 2022-09-03 PROCEDURE — 80053 COMPREHEN METABOLIC PANEL: CPT | Performed by: EMERGENCY MEDICINE

## 2022-09-03 PROCEDURE — 82077 ASSAY SPEC XCP UR&BREATH IA: CPT | Performed by: EMERGENCY MEDICINE

## 2022-09-03 PROCEDURE — 82550 ASSAY OF CK (CPK): CPT | Performed by: EMERGENCY MEDICINE

## 2022-09-03 PROCEDURE — 0 DIATRIZOATE MEGLUMINE & SODIUM PER 1 ML: Performed by: EMERGENCY MEDICINE

## 2022-09-03 PROCEDURE — 99283 EMERGENCY DEPT VISIT LOW MDM: CPT

## 2022-09-03 PROCEDURE — 83874 ASSAY OF MYOGLOBIN: CPT | Performed by: EMERGENCY MEDICINE

## 2022-09-03 PROCEDURE — 71250 CT THORAX DX C-: CPT

## 2022-09-03 PROCEDURE — 96374 THER/PROPH/DIAG INJ IV PUSH: CPT

## 2022-09-03 PROCEDURE — 96375 TX/PRO/DX INJ NEW DRUG ADDON: CPT

## 2022-09-03 PROCEDURE — 83690 ASSAY OF LIPASE: CPT | Performed by: EMERGENCY MEDICINE

## 2022-09-03 PROCEDURE — 36415 COLL VENOUS BLD VENIPUNCTURE: CPT

## 2022-09-03 PROCEDURE — 85007 BL SMEAR W/DIFF WBC COUNT: CPT | Performed by: EMERGENCY MEDICINE

## 2022-09-03 RX ORDER — AMOXICILLIN AND CLAVULANATE POTASSIUM 875; 125 MG/1; MG/1
1 TABLET, FILM COATED ORAL 2 TIMES DAILY
Qty: 14 TABLET | Refills: 0 | Status: SHIPPED | OUTPATIENT
Start: 2022-09-03 | End: 2022-09-10

## 2022-09-03 RX ORDER — AMOXICILLIN AND CLAVULANATE POTASSIUM 875; 125 MG/1; MG/1
1 TABLET, FILM COATED ORAL ONCE
Status: COMPLETED | OUTPATIENT
Start: 2022-09-03 | End: 2022-09-03

## 2022-09-03 RX ORDER — AMOXICILLIN AND CLAVULANATE POTASSIUM 875; 125 MG/1; MG/1
1 TABLET, FILM COATED ORAL 2 TIMES DAILY
Qty: 14 TABLET | Refills: 0 | Status: SHIPPED | OUTPATIENT
Start: 2022-09-03 | End: 2022-09-03 | Stop reason: SDUPTHER

## 2022-09-03 RX ORDER — PANTOPRAZOLE SODIUM 40 MG/10ML
40 INJECTION, POWDER, LYOPHILIZED, FOR SOLUTION INTRAVENOUS ONCE
Status: COMPLETED | OUTPATIENT
Start: 2022-09-03 | End: 2022-09-03

## 2022-09-03 RX ORDER — ONDANSETRON 2 MG/ML
4 INJECTION INTRAMUSCULAR; INTRAVENOUS ONCE
Status: COMPLETED | OUTPATIENT
Start: 2022-09-03 | End: 2022-09-03

## 2022-09-03 RX ORDER — SODIUM CHLORIDE 0.9 % (FLUSH) 0.9 %
10 SYRINGE (ML) INJECTION AS NEEDED
Status: DISCONTINUED | OUTPATIENT
Start: 2022-09-03 | End: 2022-09-03 | Stop reason: HOSPADM

## 2022-09-03 RX ADMIN — SODIUM CHLORIDE, POTASSIUM CHLORIDE, SODIUM LACTATE AND CALCIUM CHLORIDE 1000 ML: 600; 310; 30; 20 INJECTION, SOLUTION INTRAVENOUS at 17:38

## 2022-09-03 RX ADMIN — PANTOPRAZOLE SODIUM 40 MG: 40 INJECTION, POWDER, FOR SOLUTION INTRAVENOUS at 17:38

## 2022-09-03 RX ADMIN — ONDANSETRON 4 MG: 2 INJECTION INTRAMUSCULAR; INTRAVENOUS at 17:38

## 2022-09-03 RX ADMIN — DIATRIZOATE MEGLUMINE AND DIATRIZOATE SODIUM 30 ML: 660; 100 LIQUID ORAL; RECTAL at 18:57

## 2022-09-03 RX ADMIN — AMOXICILLIN AND CLAVULANATE POTASSIUM 1 TABLET: 875; 125 TABLET, FILM COATED ORAL at 21:07

## 2022-09-03 NOTE — CASE MANAGEMENT/SOCIAL WORK
Case Management Discharge Note                Selected Continued Care - Discharged on 9/2/2022 Admission date: 9/1/2022 - Discharge disposition: Left Against Medical Advice    Destination    No services have been selected for the patient.              Durable Medical Equipment    No services have been selected for the patient.              Dialysis/Infusion    No services have been selected for the patient.              Home Medical Care    No services have been selected for the patient.              Therapy    No services have been selected for the patient.              Community Resources    No services have been selected for the patient.              Community & DME    No services have been selected for the patient.                       Final Discharge Disposition Code: 07 - left AMA
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
## 2022-09-06 LAB
BACTERIA SPEC AEROBE CULT: NORMAL
BACTERIA SPEC AEROBE CULT: NORMAL

## 2022-09-08 LAB — MYOGLOBIN SERPL-MCNC: ABNORMAL NG/ML (ref 28–72)

## 2022-09-29 ENCOUNTER — APPOINTMENT (OUTPATIENT)
Dept: GENERAL RADIOLOGY | Facility: HOSPITAL | Age: 57
End: 2022-09-29
Payer: MEDICARE

## 2022-09-29 ENCOUNTER — HOSPITAL ENCOUNTER (EMERGENCY)
Facility: HOSPITAL | Age: 57
Discharge: HOME | End: 2022-09-29
Attending: HOSPITALIST
Payer: MEDICARE

## 2022-09-29 VITALS
RESPIRATION RATE: 18 BRPM | OXYGEN SATURATION: 99 % | SYSTOLIC BLOOD PRESSURE: 156 MMHG | HEART RATE: 90 BPM | TEMPERATURE: 98.1 F | DIASTOLIC BLOOD PRESSURE: 113 MMHG

## 2022-09-29 DIAGNOSIS — S42.254A CLOSED NONDISPLACED FRACTURE OF GREATER TUBEROSITY OF RIGHT HUMERUS, INITIAL ENCOUNTER: Primary | ICD-10-CM

## 2022-09-29 PROCEDURE — 73030 X-RAY EXAM OF SHOULDER: CPT

## 2022-09-29 PROCEDURE — 99283 EMERGENCY DEPT VISIT LOW MDM: CPT

## 2022-09-29 PROCEDURE — 6370000000 HC RX 637 (ALT 250 FOR IP): Performed by: HOSPITALIST

## 2022-09-29 RX ORDER — OXYCODONE HYDROCHLORIDE AND ACETAMINOPHEN 5; 325 MG/1; MG/1
1 TABLET ORAL EVERY 6 HOURS PRN
Qty: 12 TABLET | Refills: 0 | Status: SHIPPED | OUTPATIENT
Start: 2022-09-29 | End: 2022-10-02 | Stop reason: ALTCHOICE

## 2022-09-29 RX ORDER — OXYCODONE AND ACETAMINOPHEN 10; 325 MG/1; MG/1
1 TABLET ORAL ONCE
Status: COMPLETED | OUTPATIENT
Start: 2022-09-29 | End: 2022-09-29

## 2022-09-29 RX ADMIN — OXYCODONE HYDROCHLORIDE AND ACETAMINOPHEN 1 TABLET: 10; 325 TABLET ORAL at 10:48

## 2022-09-29 ASSESSMENT — PAIN SCALES - GENERAL
PAINLEVEL_OUTOF10: 10
PAINLEVEL_OUTOF10: 10

## 2022-09-29 ASSESSMENT — PAIN - FUNCTIONAL ASSESSMENT: PAIN_FUNCTIONAL_ASSESSMENT: 0-10

## 2022-09-29 NOTE — ED PROVIDER NOTES
[facility] ENCOUNTER      Pt Name: Audra Manuel  MRN: [id number]  YOB: 1965  Date of evaluation: 9/29/2022  Provider: Corona Kumar, [address] [place] [address]       Chief Complaint   Patient presents with    Shoulder Injury           HISTORY OF PRESENT ILLNESS  (Location/Symptom, Timing/Onset, Context/Setting, Quality, Duration, Modifying Factors, Severity.)   Audra Manuel is a 59 y.o. male who presents to the emergency department for shoulder pain. Patient states that within the last 45minutes he had incident where he fell from his bed. Patient states he has a hospital bed at home. His son awoke him for some coffee. Patient states that he accidentally fell out of the bed while getting up. He states when he landed he hit against his left shoulder area. He denied any head injury or loss of consciousness. Patient states that he feels like he is done something or injured his shoulder because he cannot move it very well. He states he can still move his elbow and his wrist.  He can still  with his hand but he states any movement causes him discomfort in his shoulder area. He rates the pain as a 10 out of 10. Patient actually ambulated into the emergency department bracing his arm against himself so it did not move. He denies any numbness tingling or weakness to the extremity. Denies any neck or back pain out of ordinary. Denied any other complaint except focal injury to the left shoulder area. Patient is right-hand dominant      Nursing notes were reviewed.     REVIEW OFSYSTEMS    (2-9 systems for level 4, 10 or more for level 5)   ROS:  General:  No fevers, no chills, no weakness  Cardiovascular:  No chest pain, no palpitations  Respiratory:  No shortness of breath, no cough, no wheezing  Gastrointestinal:  No pain, no nausea, no vomiting, no diarrhea  Musculoskeletal:  No muscle pain, + left shoulder pain  Skin:  No rash, no easy bruising  Neurologic:  No speech problems, no headache, no extremity weakness  Psychiatric:  No anxiety  Genitourinary:  No dysuria, no hematuria    Except as noted above the remainder of the review of systems was reviewed and negative. PAST MEDICAL HISTORY     Past Medical History:   Diagnosis Date    CAD (coronary artery disease)     Cancer ([facility]     Charcot-Lakia-Tooth disease     CHF (congestive heart failure) (HCC)     Colon cancer (HCC)     COPD (chronic obstructive pulmonary disease) ([facility]     MI (myocardial infarction) ([facility]     x2    Muscular dystrophies ([facility]          SURGICAL HISTORY       Past Surgical History:   Procedure Laterality Date    ANKLE SURGERY      bilateral    ARM SURGERY Bilateral     Crush injury    BRAIN SURGERY      skull injury, metal palte in head    COLONOSCOPY      patient states colon cancer    FRACTURE SURGERY      JOINT REPLACEMENT      left wrist    TONSILLECTOMY           CURRENT MEDICATIONS       Previous Medications    ALBUTEROL SULFATE HFA (VENTOLIN HFA) 108 (90 BASE) MCG/ACT INHALER    Inhale 2 puffs into the lungs Every 4-6 hours as needed. ASPIRIN EC 81 MG EC TABLET    Take 1 tablet by mouth daily    BUDESONIDE-FORMOTEROL (SYMBICORT) 160-4.5 MCG/ACT AERO    Inhale 2 puffs into the lungs 2 times daily    FOLIC ACID (FOLVITE) 1 MG TABLET    Take 1 tablet by mouth daily    GABAPENTIN (NEURONTIN) 300 MG CAPSULE    TAKE ONE CAPSULE BY MOUTH THREE TIMES A DAY    LATANOPROST (XALATAN) 0.005 % OPHTHALMIC SOLUTION    Place 1 drop into both eyes nightly    LISINOPRIL (PRINIVIL;ZESTRIL) 30 MG TABLET    Take 0.5 tablets by mouth daily    NITROGLYCERIN (NITROSTAT) 0.4 MG SL TABLET    Place 1 tablet under the tongue every 5 minutes as needed for Chest pain up to max of 3 total doses. If no relief after 1 dose, call 911.     PREDNISONE (DELTASONE) 10 MG TABLET    Take 3 tablets PO daily x 2 days, 2 tablets PO daily x 2 days, 1 tablet PO daily x 2 days, then stop PROPRANOLOL (INDERAL) 10 MG TABLET    Take 10 mg by mouth 2 times daily    TIOTROPIUM (SPIRIVA HANDIHALER) 18 MCG INHALATION CAPSULE    Inhale 1 capsule into the lungs daily       ALLERGIES     Patient has no known allergies. FAMILY HISTORY       Family History   Problem Relation Age of Onset    Other Father         charjoni johnston tooth    Heart Attack Father     Cancer Father         unknown    Heart Attack Brother           SOCIAL HISTORY       Social History     Socioeconomic History    Marital status:      Spouse name: None    Number of children: None    Years of education: None    Highest education level: None   Tobacco Use    Smoking status: Every Day     Packs/day: 0.50     Years: 40.00     Pack years: 20.00     Types: Cigarettes    Smokeless tobacco: Never   Vaping Use    Vaping Use: Never used   Substance and Sexual Activity    Alcohol use: Yes     Comment: 3 beers yesterday    Drug use: Yes     Types: IV, Methamphetamines (Crystal Meth)     Comment: pt rpts meth, IV this week    Sexual activity: Yes     Partners: Female         PHYSICAL EXAM    (up to 7 for level 4, 8 or more for level 5)     ED Triage Vitals   BP Temp Temp src Pulse Resp SpO2 Height Weight   -- -- -- -- -- -- -- --       Physical Exam  General :Patient is awake, alert, oriented, in no acute distress, nontoxic appearing  HEENT: Pupils are equally round and reactive to light, EOMI, conjunctivae clear. Oral mucosa is moist, no exudate. Uvula is midline  Neck: Neck is supple, full range of motion, trachea midline, no midline bony tenderness  Cardiac: Heart regular rate, rhythm, no murmurs, rubs, or gallops  Lungs: Lungs are clear to auscultation, there is no wheezing, rhonchi, or rales. There is no use of accessory muscles. Abdomen: Abdomen is soft, nontender, nondistended. There is no firm or pulsatile masses, no rebound rigidity or guarding.    Musculoskeletal: No focal muscle deficits are appreciated, range of motion is greatly decreased at the left shoulder area. He will not tolerate hardly any movement at all. The patient will passively move the shoulder by holding his hand and adjusting it in the appropriate position. Cap refills less than 3 seconds distally. He has good  strength of the hand. Radial pulses +2. Neurovascular intact distally. There is no obvious deformity noted to the left shoulder area no palpable dislocation. Normal palpable clavicle. Neuro: Motor intact, sensory intact, level of consciousness is normal  Dermatology: Skin is warm and dry  Psych: Mentation is grossly normal, cognition is grossly normal. Affect is appropriate. DIAGNOSTIC RESULTS     EKG: All EKG's are interpreted by the Emergency Department Physician who either signs or Co-signs this chart in the [address] a cardiologist.        RADIOLOGY:   Non-plain film images such as CT, Ultrasound and MRI are read by the radiologist. Plain radiographic images are visualized and preliminarily interpreted by the emergency physician with the below findings:      [] Radiologist's Report Reviewed:  XR SHOULDER LEFT (MIN 2 VIEWS)   Final Result      3 views demonstrate nondisplaced fracture of the greater tuberosity of the humerus. No dislocation. Mild AC joint osteoarthritis. ED BEDSIDE ULTRASOUND:   Performed by ED Physician - none    LABS:    I have reviewed and interpreted all of the currently available lab results from this visit (ifapplicable):  No results found for this visit on 09/29/22. All other labs were within normal range or not returned as of this dictation.     EMERGENCY DEPARTMENT COURSE and DIFFERENTIAL DIAGNOSIS/MDM:   Vitals:    Vitals:    09/29/22 1042   BP: (!) 156/113   Pulse: 90   Resp: 18   Temp: 98.1 °F (36.7 °C)   SpO2: 99%       MEDICATIONS ADMINISTERED IN ED:  Medications   oxyCODONE-acetaminophen (PERCOCET)  MG per tablet 1 tablet (1 tablet Oral Given 9/29/22 1048)       After initial evaluation examination I did have conversation with the patient about the upcoming plan, treatment possible disposition which they are agreeable to the times dictation. Patient advised that we will give him something here for pain. He states he does have a ride and is not by himself. We will give him a Percocet 10/325 tablet for pain. Patient will have radiograph of the left shoulder performed. Patient advised that most likely he probably will end up in a sling no matter what the x-ray shows fracture or not but depending on the findings of the x-ray he may need something more complex treatment wise than just a sling itself. Also advised that if his symptoms do not improve within 1 week he should follow-up with an orthopedic surgeon he does state his understanding of this. Otherwise final disposition determined once his radiological studies been performed reviewed. Patient nontoxic-appearing at this time. Radiograph the left shoulder read by radiology as 3 views demonstrate nondisplaced fracture of the greater tuberosity of the humerus. No dislocation. Mild AC joint osteoarthritis. Patient's radiological findings were discussed with him and he does state his understanding. Patient advised the son's radiograph. I actually showed the fracture to him it is just of the greater tuberosity is not the actual full head of the humerus itself. There is no impact to the joint itself. Just hurts because of the injury to the humeral head itself. Advised that we given information for local orthopedic surgeon to follow-up within 1 week usually these are nonsurgical specially since it is not displaced. Patient be written for a pain medication advised that we should be able to discharge him home in stable condition. We will place him in a shoulder immobilizer. Otherwise discharged home in stable condition.     The patient advised that he does need to follow-up with his regular family physician within the next 1 to 2 days for evaluation. He was also given instruction of the symptoms worsens or new symptoms arise he should return back to emergency department for further evaluation work-up. Is this patient to be included in the SEP-1 Core Measure due to severe sepsis or septic shock? No   Exclusion criteria - the patient is NOT to be included for SEP-1 Core Measure due to: Infection is not suspected          CONSULTS:  None    PROCEDURES:  Procedures    CRITICAL CARE TIME    Total Critical Care time was 0 minutes, excluding separately reportable procedures. There was a high probability of clinically significant/life threatening deterioration in the patient's condition which required my urgent intervention. FINAL IMPRESSION      1. Closed nondisplaced fracture of greater tuberosity of right humerus, initial encounter          DISPOSITION/PLAN   DISPOSITION Discharge - Pending Orders Complete 09/29/2022 11:05:48 AM      PATIENT REFERRED TO:  Desirae Alexis [address]  [phone]    In 2 days      [facility] Emergency Department  [address] [address]  [phone]    As needed, If symptoms worsen    Josué Salinas MD  [address] [place] [address]  [phone]    Schedule an appointment as soon as possible for a visit in 1 week      DISCHARGE MEDICATIONS:  New Prescriptions    OXYCODONE-ACETAMINOPHEN (PERCOCET) 5-325 MG PER TABLET    Take 1 tablet by mouth every 6 hours as needed for Pain for up to 3 days. Intended supply: 3 days. Take lowest dose possible to manage pain       Comment: Please note this report has been produced using speech recognition software and may contain errorsrelated to that system including errors in grammar, punctuation, and spelling, as well as words and phrases that may be inappropriate. If there are any questions or concerns please feel free to contact the dictating providerfor clarification.     Russell Concepcion DO  Attending Emergency Physician               Rowan Mora DO  09/29/22 1104

## 2022-09-29 NOTE — ED NOTES
Pt verbalizes understanding of discharge instructions at this time.       Dieudonne Day RN  09/29/22 9310

## 2022-10-02 ENCOUNTER — HOSPITAL ENCOUNTER (EMERGENCY)
Facility: HOSPITAL | Age: 57
Discharge: HOME | End: 2022-10-02
Attending: EMERGENCY MEDICINE
Payer: MEDICARE

## 2022-10-02 VITALS
RESPIRATION RATE: 20 BRPM | HEIGHT: 70 IN | OXYGEN SATURATION: 96 % | SYSTOLIC BLOOD PRESSURE: 150 MMHG | TEMPERATURE: 97.9 F | DIASTOLIC BLOOD PRESSURE: 139 MMHG | BODY MASS INDEX: 27.49 KG/M2 | WEIGHT: 192 LBS | HEART RATE: 107 BPM

## 2022-10-02 DIAGNOSIS — S42.255D CLOSED NONDISPLACED FRACTURE OF GREATER TUBEROSITY OF LEFT HUMERUS WITH ROUTINE HEALING, SUBSEQUENT ENCOUNTER: Primary | ICD-10-CM

## 2022-10-02 PROCEDURE — 99284 EMERGENCY DEPT VISIT MOD MDM: CPT

## 2022-10-02 PROCEDURE — 6360000002 HC RX W HCPCS: Performed by: EMERGENCY MEDICINE

## 2022-10-02 PROCEDURE — 96372 THER/PROPH/DIAG INJ SC/IM: CPT

## 2022-10-02 RX ORDER — MORPHINE SULFATE 10 MG/ML
8 INJECTION, SOLUTION INTRAMUSCULAR; INTRAVENOUS
Status: COMPLETED | OUTPATIENT
Start: 2022-10-02 | End: 2022-10-02

## 2022-10-02 RX ORDER — OXYCODONE HYDROCHLORIDE AND ACETAMINOPHEN 5; 325 MG/1; MG/1
1 TABLET ORAL EVERY 6 HOURS PRN
Qty: 12 TABLET | Refills: 0 | Status: SHIPPED | OUTPATIENT
Start: 2022-10-02 | End: 2022-10-05

## 2022-10-02 RX ORDER — CETIRIZINE HYDROCHLORIDE 10 MG/1
10 TABLET ORAL DAILY
COMMUNITY
Start: 2022-08-19

## 2022-10-02 RX ORDER — CLONIDINE HYDROCHLORIDE 0.1 MG/1
0.1 TABLET ORAL DAILY
COMMUNITY
Start: 2022-08-26

## 2022-10-02 RX ADMIN — MORPHINE SULFATE 8 MG: 10 INJECTION, SOLUTION INTRAMUSCULAR; INTRAVENOUS at 17:07

## 2022-10-02 ASSESSMENT — PAIN SCALES - GENERAL
PAINLEVEL_OUTOF10: 8
PAINLEVEL_OUTOF10: 10
PAINLEVEL_OUTOF10: 10

## 2022-10-02 ASSESSMENT — PAIN DESCRIPTION - LOCATION
LOCATION: SHOULDER

## 2022-10-02 ASSESSMENT — PAIN - FUNCTIONAL ASSESSMENT: PAIN_FUNCTIONAL_ASSESSMENT: 0-10

## 2022-10-02 ASSESSMENT — PAIN DESCRIPTION - DESCRIPTORS: DESCRIPTORS: THROBBING

## 2022-10-02 ASSESSMENT — PAIN DESCRIPTION - PAIN TYPE: TYPE: ACUTE PAIN

## 2022-10-02 NOTE — ED TRIAGE NOTES
Pt to ED for refill of pain medication r/t fall and shoulder fracture. Pt is unable to follow up with ortho until Monday.

## 2022-10-02 NOTE — ED PROVIDER NOTES
Ghada Linder [facility] ENCOUNTER      Pt Name: Edvin Montero  MRN: [id number]  YOB: 1965  Date of evaluation: 10/2/2022  Provider: Darrel Jha MD    [address] [place] [address]       Chief Complaint   Patient presents with    Shoulder Pain         HISTORY OF PRESENT ILLNESS  (Location/Symptom, Timing/Onset, Context/Setting, Quality, Duration, Modifying Factors, Severity.)   Edvin Montero is a 59 y.o. male who presents to the emergency department stating that he had been seen here 3 days ago diagnosed with a fracture of his humerus he was referred to Ortho at [address] [place] [address] he tried to make an appointment with them but they was told they are not seeing any new patients and was referred from there to [address] he is waiting to call [facility] orthopedics to try to get an appointment tomorrow morning but in the meantime he is run out of his pain medication so has returned for pain medication until he can get to a follow-up appointment. Nursing notes were reviewed. REVIEW OFSYSTEMS    (2-9 systems for level 4, 10 or more for level 5)   ROS:  General:  No fevers, no chills, no weakness  Cardiovascular:  No chest pain, no palpitations  Respiratory:  No shortness of breath, no cough, no wheezing  Gastrointestinal:  No pain, no nausea, no vomiting, no diarrhea  Musculoskeletal:  + muscle pain, + joint pain  Skin:  No rash, no easy bruising  Neurologic:  No speech problems, no headache, no extremity weakness  Psychiatric:  No anxiety  Genitourinary:  No dysuria, no hematuria    Except as noted above the remainder of the review of systems was reviewed and negative.        PAST MEDICAL HISTORY     Past Medical History:   Diagnosis Date    CAD (coronary artery disease)     Cancer ([facility]     Charcot-Lakia-Tooth disease     CHF (congestive heart failure) (HCC)     Colon cancer ([facility]     COPD (chronic obstructive pulmonary disease) ([facility]     MI (myocardial infarction) ([facility]     x2    Muscular dystrophies ([facility]          SURGICAL HISTORY       Past Surgical History:   Procedure Laterality Date    ANKLE SURGERY      bilateral    ARM SURGERY Bilateral     Crush injury    BRAIN SURGERY      skull injury, metal palte in head    COLONOSCOPY      patient states colon cancer    FRACTURE SURGERY      JOINT REPLACEMENT      left wrist    TONSILLECTOMY           CURRENT MEDICATIONS       Previous Medications    ALBUTEROL SULFATE HFA (PROVENTIL;VENTOLIN;PROAIR) 108 (90 BASE) MCG/ACT INHALER    Inhale 2 puffs into the lungs Every 4-6 hours as needed. BUDESONIDE-FORMOTEROL (SYMBICORT) 160-4.5 MCG/ACT AERO    Inhale 2 puffs into the lungs 2 times daily    FOLIC ACID (FOLVITE) 1 MG TABLET    Take 1 tablet by mouth daily    GABAPENTIN (NEURONTIN) 300 MG CAPSULE    TAKE ONE CAPSULE BY MOUTH THREE TIMES A DAY    LATANOPROST (XALATAN) 0.005 % OPHTHALMIC SOLUTION    Place 1 drop into both eyes nightly    LISINOPRIL (PRINIVIL;ZESTRIL) 30 MG TABLET    Take 0.5 tablets by mouth daily    NITROGLYCERIN (NITROSTAT) 0.4 MG SL TABLET    Place 1 tablet under the tongue every 5 minutes as needed for Chest pain up to max of 3 total doses. If no relief after 1 dose, call 911. PROPRANOLOL (INDERAL) 10 MG TABLET    Take 10 mg by mouth 2 times daily    TIOTROPIUM (SPIRIVA HANDIHALER) 18 MCG INHALATION CAPSULE    Inhale 1 capsule into the lungs daily       ALLERGIES     Patient has no known allergies.     FAMILY HISTORY       Family History   Problem Relation Age of Onset    Other Father         charcot preston tooth    Heart Attack Father     Cancer Father         unknown    Heart Attack Brother           SOCIAL HISTORY       Social History     Socioeconomic History    Marital status:      Spouse name: None    Number of children: None    Years of education: None    Highest education level: None   Tobacco Use    Smoking status: Every Day     Packs/day: 0.50     Years: 40.00     Pack years: 20.00     Types: Cigarettes    Smokeless tobacco: Never   Vaping Use    Vaping Use: Never used   Substance and Sexual Activity    Alcohol use: Not Currently     Comment: 3 beers yesterday    Drug use: Yes     Types: IV, Methamphetamines (Crystal Meth)     Comment: pt rpts meth, IV this week    Sexual activity: Yes     Partners: Female         PHYSICAL EXAM    (up to 7 for level 4, 8 or more for level 5)     ED Triage Vitals [10/02/22 1525]   BP Temp Temp Source Heart Rate Resp SpO2 Height Weight   (!) 141/105 97.9 °F (36.6 °C) Temporal (!) 107 20 98 % 5' 10\" (1.778 m) 192 lb (87.1 kg)       Physical Exam  General :Patient is awake, alert, oriented, in no acute distress, nontoxic appearing  HEENT: Pupils are equally round and reactive to light, EOMI, conjunctivae clear. Neck: Neck is supple, full range of motion, trachea midline    Musculoskeletal: 5 out of 5 strength in all 4 extremities. No focal muscle deficits are appreciated left arm in a shoulder immobilizer which was placed 3 days ago. He has good radial pulse sensations intact in the hand and forearm he has full range of motion of the fingers there is good cap refill. Neuro: Motor intact, sensory intact, level of consciousness is normal,Dermatology: Skin is warm and dry  Psych: Mentation is grossly normal, cognition is grossly normal. Affect is appropriate.       DIAGNOSTIC RESULTS     EKG: All EKG's are interpreted by the Emergency Department Physician who either signs or Co-signs this chart in the [address] a cardiologist.    The EKG interpreted by me shows     RADIOLOGY:   Non-plain film images such as CT, Ultrasound and MRI are read by the radiologist. Plain radiographic images are visualized and preliminarily interpreted by the emergency physician with the below findings:      [] Radiologist's Report Reviewed:  No orders to display         ED BEDSIDE ULTRASOUND:   Performed by ED Physician - none    LABS:    I have reviewed and interpreted all of the currently available lab results from this visit (ifapplicable):  No results found for this visit on 10/02/22. All other labs were within normal range or not returned as of this dictation. EMERGENCY DEPARTMENT COURSE and DIFFERENTIAL DIAGNOSIS/MDM:   Vitals:    Vitals:    10/02/22 1525   BP: (!) 141/105   Pulse: (!) 107   Resp: 20   Temp: 97.9 °F (36.6 °C)   TempSrc: Temporal   SpO2: 98%   Weight: 192 lb (87.1 kg)   Height: 5' 10\" (1.778 m)       MEDICATIONS ADMINISTERED IN ED:  Medications   morphine (PF) injection 8 mg (8 mg IntraMUSCular Given 10/2/22 1707)       Patient will be given a pain shot to get him through the night and then I will call him in a prescription for pain medication to get him through until he can see the orthopedist.      The patient will follow-up with their PCP in 1-2 days for reevaluation. If the patient or family members have anyfurther concerns or any worsening symptoms they will return to the ED for reevaluation. Is this patient to be included in the SEP-1 Core Measure due to severe sepsis or septic shock? No   Exclusion criteria - the patient is NOT to be included for SEP-1 Core Measure due to: Alternative explanation for abnormal labs/vitals that do not relate to sepsis, see MDM for further explanation          CONSULTS:  None    PROCEDURES:  Procedures    CRITICAL CARE TIME    Total Critical Care time was 0 minutes, excluding separately reportable procedures. There was a high probability of clinically significant/life threatening deterioration in the patient's condition which required my urgent intervention. FINAL IMPRESSION      1.  Closed nondisplaced fracture of greater tuberosity of left humerus with routine healing, subsequent encounter Stable         DISPOSITION/PLAN   DISPOSITION Decision To Discharge 10/02/2022 05:03:52 PM   stable discharge to home    PATIENT REFERRED TO:  [facility] orthopedics    Schedule an appointment as soon as possible for a visit in 3 days      DISCHARGE MEDICATIONS:  New Prescriptions    OXYCODONE-ACETAMINOPHEN (PERCOCET) 5-325 MG PER TABLET    Take 1 tablet by mouth every 6 hours as needed for Pain for up to 3 days. Intended supply: 3 days. Take lowest dose possible to manage pain       Comment: Please note this report has been produced using speech recognition software and may contain errorsrelated to that system including errors in grammar, punctuation, and spelling, as well as words and phrases that may be inappropriate. If there are any questions or concerns please feel free to contact the dictating providerfor clarification.     Jer Goodson MD  Attending Emergency Physician               Jer Goodson MD  10/02/22 0029

## 2022-10-02 NOTE — ED NOTES
Pt informed of d/c instructions. Pt verbalizes understanding.       Manuel Bojorquez RN  10/02/22 0561

## 2022-10-03 NOTE — CARE COORDINATION
Placed call to [address] [place] [address] [facility] regarding patient appointment. Patients was scheduled 10/21, Called office to see if patients can get in sooner. Patient is now scheduled for 10/3/2022 at 1:20. Called patient regarding this appointment. He is going to call his family to see if they can take him, if not he will give us a call back.

## 2023-01-26 ENCOUNTER — HOSPITAL ENCOUNTER (EMERGENCY)
Facility: HOSPITAL | Age: 58
Discharge: HOME | End: 2023-01-26
Attending: HOSPITALIST
Payer: MEDICARE

## 2023-01-26 ENCOUNTER — APPOINTMENT (OUTPATIENT)
Dept: GENERAL RADIOLOGY | Facility: HOSPITAL | Age: 58
End: 2023-01-26
Payer: MEDICARE

## 2023-01-26 VITALS
SYSTOLIC BLOOD PRESSURE: 120 MMHG | RESPIRATION RATE: 22 BRPM | DIASTOLIC BLOOD PRESSURE: 88 MMHG | HEIGHT: 71 IN | HEART RATE: 87 BPM | WEIGHT: 191 LBS | BODY MASS INDEX: 26.74 KG/M2 | OXYGEN SATURATION: 97 % | TEMPERATURE: 98.1 F

## 2023-01-26 DIAGNOSIS — J20.9 ACUTE BRONCHITIS WITH CHRONIC OBSTRUCTIVE PULMONARY DISEASE (COPD) (HCC): Primary | ICD-10-CM

## 2023-01-26 DIAGNOSIS — J44.0 ACUTE BRONCHITIS WITH CHRONIC OBSTRUCTIVE PULMONARY DISEASE (COPD) (HCC): Primary | ICD-10-CM

## 2023-01-26 LAB
RAPID INFLUENZA  B AGN: NEGATIVE
RAPID INFLUENZA A AGN: NEGATIVE
SARS-COV-2, NAAT: NOT DETECTED

## 2023-01-26 PROCEDURE — 87635 SARS-COV-2 COVID-19 AMP PRB: CPT

## 2023-01-26 PROCEDURE — 71045 X-RAY EXAM CHEST 1 VIEW: CPT

## 2023-01-26 PROCEDURE — 96372 THER/PROPH/DIAG INJ SC/IM: CPT

## 2023-01-26 PROCEDURE — 87804 INFLUENZA ASSAY W/OPTIC: CPT

## 2023-01-26 PROCEDURE — 6360000002 HC RX W HCPCS: Performed by: HOSPITALIST

## 2023-01-26 PROCEDURE — 99284 EMERGENCY DEPT VISIT MOD MDM: CPT

## 2023-01-26 RX ORDER — PREDNISONE 20 MG/1
40 TABLET ORAL DAILY
Qty: 20 TABLET | Refills: 0 | Status: SHIPPED | OUTPATIENT
Start: 2023-01-26 | End: 2023-02-05

## 2023-01-26 RX ORDER — METHYLPREDNISOLONE SODIUM SUCCINATE 125 MG/2ML
60 INJECTION, POWDER, LYOPHILIZED, FOR SOLUTION INTRAMUSCULAR; INTRAVENOUS ONCE
Status: COMPLETED | OUTPATIENT
Start: 2023-01-26 | End: 2023-01-26

## 2023-01-26 RX ORDER — GUAIFENESIN 600 MG/1
1200 TABLET, EXTENDED RELEASE ORAL 2 TIMES DAILY
Qty: 28 TABLET | Refills: 0 | Status: SHIPPED | OUTPATIENT
Start: 2023-01-26 | End: 2023-02-02

## 2023-01-26 RX ADMIN — METHYLPREDNISOLONE SODIUM SUCCINATE 60 MG: 125 INJECTION, POWDER, FOR SOLUTION INTRAMUSCULAR; INTRAVENOUS at 08:27

## 2023-01-26 ASSESSMENT — LIFESTYLE VARIABLES
HOW MANY STANDARD DRINKS CONTAINING ALCOHOL DO YOU HAVE ON A TYPICAL DAY: PATIENT DOES NOT DRINK
HOW OFTEN DO YOU HAVE A DRINK CONTAINING ALCOHOL: NEVER

## 2023-01-26 ASSESSMENT — PAIN - FUNCTIONAL ASSESSMENT: PAIN_FUNCTIONAL_ASSESSMENT: 0-10

## 2023-01-26 ASSESSMENT — PAIN DESCRIPTION - LOCATION: LOCATION: BACK

## 2023-01-26 NOTE — ED TRIAGE NOTES
Patient to ED with c/c of cough and SOA. Symptoms have been ongoing for couple weeks. Patient has been to his pcp and they have attempted to call him in cough syrup multiple times but his insurance company will not approve it.

## 2023-01-26 NOTE — ED PROVIDER NOTES
[address] [facility] [address] ENCOUNTER      Pt Name: Lacy Haq  MRN: [id number]  YOB: 1965  Date of evaluation: 1/26/2023  Provider: Sami Zamora, [facility]       Chief Complaint   Patient presents with    Cough    Shortness of Breath           HISTORY OF PRESENT ILLNESS  (Location/Symptom, Timing/Onset, Context/Setting, Quality, Duration, Modifying Factors, Severity.)   Lacy Haq is a 59 y.o. male who presents to the emergency department for cough and chills. Patient states that he started coughing about 2 weeks ago. He has been seen by his primary care provider several times for this. States they have written several different types of cough medicine to see if this has helped but he states every time he gets prescribed a medication and has to be preapproved and his insurance would not pay for it. Patient states that finally they told him they had a cough medicine for him and when he went to go pick it up but still would not cover the cost of the medication and he did not have the money to get it. Patient states he finally just became upset and decided to come here to the emergency department for evaluation. Patient does have a history of COPD. He states he smokes about 8 cigarettes a day but he smoked over 40 years. Patient is not on oxygen at home. Patient states that his cough is productive he is getting greenish to yellow and sometimes brownish looking material up when he does cough. He states he did have 1 episode of some blood in his sputum at one time because he had a run of coughing that he just could not stop coughing with. He states this was several days ago but he has not had any other blood or anything in his sputum since then. Patient states he does feel like he is short of breath at times but he denies any chest pain. He denies any headaches out of ordinary. Denies any sore throat or earache.   Denies any loss of taste or smell. Denies any nausea vomiting or diarrhea. Patient states he is eating and drinking without any difficulty. He states he does have body aches is that his back especially his low back especially when he coughs he states has been hurting and aggravating him. But he denies any body aches to the extremities. Patient denies any fevers but states he feels like he has been chilling some. Denies any dysuria or change urinary frequency. Denies any numbness tingling weakness of extremities out of ordinary. Patient again does have history of COPD he is on inhalers at home already he has albuterol and Symbicort which she did bring with him. He does not have a nebulizer at home. Patient has a history of Charcot Carlena Radar tooth syndrome placed states he really does not get out and do much when he is at home he is really just sits around. He states that he gets out to the grocery store 1 month to get groceries. He advises that he is vaccinated for COVID with 2 injections but no flu vaccination. Patient does have primary care provider to follow-up with. Patient denies any sick contacts that he is aware of. He does have family support. Patient also has history of coronary artery disease and myocardial infarction in the past.      Nursing notes were reviewed. REVIEW OFSYSTEMS    (2-9 systems for level 4, 10 or more for level 5)   ROS:  General:  No fevers, +chills, no weakness, +body aches  Cardiovascular:  No chest pain, no palpitations  Respiratory:  +shortness of breath, no cough, no wheezing  Gastrointestinal:  No pain, no nausea, no vomiting, no diarrhea  Musculoskeletal:  No muscle pain, no joint pain  Skin:  No rash, no easy bruising  Neurologic:  No speech problems, no headache, no extremity weakness  Psychiatric:  No anxiety  Genitourinary:  No dysuria, no hematuria    Except as noted above the remainder of the review of systems was reviewed and negative.        PAST MEDICAL HISTORY Past Medical History:   Diagnosis Date    CAD (coronary artery disease)     [facility]     Charcot-Lakia-Tooth disease     CHF (congestive heart failure) (HCC)     Colon cancer (HCC)     COPD (chronic obstructive pulmonary disease) (HCC)     MI (myocardial infarction) ([facility]     x2    Muscular dystrophies ([facility]          SURGICAL HISTORY       Past Surgical History:   Procedure Laterality Date    ANKLE SURGERY      bilateral    ARM SURGERY Bilateral     Crush injury    BRAIN SURGERY      skull injury, metal palte in head    COLONOSCOPY      patient states colon cancer    FRACTURE SURGERY      JOINT REPLACEMENT      left wrist    TONSILLECTOMY           CURRENT MEDICATIONS       Previous Medications    ALBUTEROL SULFATE HFA (PROVENTIL;VENTOLIN;PROAIR) 108 (90 BASE) MCG/ACT INHALER    Inhale 2 puffs into the lungs Every 4-6 hours as needed. BUDESONIDE-FORMOTEROL (SYMBICORT) 160-4.5 MCG/ACT AERO    Inhale 2 puffs into the lungs 2 times daily    CETIRIZINE (ZYRTEC) 10 MG TABLET    Take 10 mg by mouth daily    CLONIDINE (CATAPRES) 0.1 MG TABLET    Take 0.1 mg by mouth daily    FOLIC ACID (FOLVITE) 1 MG TABLET    Take 1 tablet by mouth daily    GABAPENTIN (NEURONTIN) 300 MG CAPSULE    TAKE ONE CAPSULE BY MOUTH THREE TIMES A DAY    LATANOPROST (XALATAN) 0.005 % OPHTHALMIC SOLUTION    Place 1 drop into both eyes nightly    LISINOPRIL (PRINIVIL;ZESTRIL) 30 MG TABLET    Take 0.5 tablets by mouth daily    NITROGLYCERIN (NITROSTAT) 0.4 MG SL TABLET    Place 1 tablet under the tongue every 5 minutes as needed for Chest pain up to max of 3 total doses. If no relief after 1 dose, call 911. PROPRANOLOL (INDERAL) 10 MG TABLET    Take 10 mg by mouth 2 times daily    TIOTROPIUM (SPIRIVA HANDIHALER) 18 MCG INHALATION CAPSULE    Inhale 1 capsule into the lungs daily       ALLERGIES     Patient has no known allergies.     FAMILY HISTORY       Family History   Problem Relation Age of Onset    Other Father         sharmaine johnston tooth    Heart Attack Father     Cancer Father         unknown    Heart Attack Brother           SOCIAL HISTORY       Social History     Socioeconomic History    Marital status:      Spouse name: None    Number of children: None    Years of education: None    Highest education level: None   Tobacco Use    Smoking status: Every Day     Packs/day: 0.50     Years: 50.00     Pack years: 25.00     Types: Cigarettes    Smokeless tobacco: Never   Substance and Sexual Activity    Alcohol use: Not Currently     Comment: 3 beers yesterday    Drug use: Not Currently         PHYSICAL EXAM    (up to 7 for level 4, 8 or more for level 5)     ED Triage Vitals   BP Temp Temp src Pulse Resp SpO2 Height Weight   -- -- -- -- -- -- -- --       Physical Exam  General :Patient is awake, alert, oriented, in no acute distress, nontoxic appearing  HEENT: Pupils are equally round and reactive to light, EOMI, conjunctivae clear. Oral mucosa is moist, no exudate. Uvula is midline  Cardiac: Heart regular rate, rhythm, no murmurs, rubs, or gallops  Lungs: Lungs are clear to auscultation, there is no wheezing, rhonchi, or rales. There is no use of accessory muscles. Abdomen: Abdomen is soft, nontender, nondistended. There is no firm or pulsatile masses, no rebound rigidity or guarding. Musculoskeletal: No focal muscle deficits are appreciated  Neuro: Motor intact, sensory intact, level of consciousness is normal  Dermatology: Skin is warm and dry  Psych: Mentation is grossly normal, cognition is grossly normal. Affect is appropriate.       DIAGNOSTIC RESULTS     EKG: All EKG's are interpreted by the Emergency Department Physician who either signs or Co-signs this chart in the [address] a cardiologist.        RADIOLOGY:   Non-plain film images such as CT, Ultrasound and MRI are read by the radiologist. Plain radiographic images are visualized and preliminarily interpreted by the emergency physician with the below findings:      [] Radiologist's Report Reviewed:  XR CHEST PORTABLE   Final Result      1. No acute disease. ED BEDSIDE ULTRASOUND:   Performed by ED Physician - none    LABS:    I have reviewed and interpreted all of the currently available lab results from this visit (ifapplicable):  Results for orders placed or performed during the hospital encounter of 01/26/23   COVID-19, Rapid    Specimen: Nasopharyngeal Swab   Result Value Ref Range    SARS-CoV-2, NAAT Not Detected Not Detected   Rapid Influenza A/B Antigens    Specimen: Nasopharyngeal   Result Value Ref Range    Rapid Influenza A Ag Negative Negative    Rapid Influenza B Ag Negative Negative        All other labs were within normal range or not returned as of this dictation. EMERGENCY DEPARTMENT COURSE and DIFFERENTIAL DIAGNOSIS/MDM:   Vitals:    Vitals:    01/26/23 0804 01/26/23 0817 01/26/23 0832 01/26/23 0900   BP: (!) 149/95 130/78 120/88    Pulse: 87      Resp: 22 22   Temp: 98.1 °F (36.7 °C)      TempSrc: Oral      SpO2: 97% 97% 96%    Weight: 191 lb (86.6 kg)      Height: 5' 11\" (1.803 m)          MEDICATIONS ADMINISTERED IN ED:  Medications   methylPREDNISolone sodium (SOLU-MEDROL) injection 60 mg (60 mg IntraMUSCular Given 1/26/23 0827)       After initial evaluation examination I did have conversation with the patient about upcoming plan, treatment possible disposition which they are agreeable to the times dictation. Patient was very verbal and vocalized all of the problems that he been having over the last couple of weeks. He did not have any conversational dyspnea. Patient's pulse ox is 96% on room air. Heart rate is 85. Blood pressure was 149/95. Patient advised that we will go ahead and swab him for COVID and influenza but his symptoms really do sound more consistent with a COPD exacerbation. Patient states that during the 2 weeks that he has been trying to get his cough medicine he has not been on any steroids.   Patient advised that I do believe that he would probably benefit from some steroids I think this would probably help his cough more than anything but with his cough being productive then he would probably also benefit from Mucinex to help keep the cough being productive. He states he has been taking some of this in the past but he is out of that medication now. He does have his inhalers with him he has albuterol and a Symbicort. Again advised to use the Symbicort as directed but he could use the albuterol 2 puffs every 4-6 hours as needed for shortness of breath. Patient states his understanding of this. Advised that final disposition will determine once his radiological diagnostic studies been performed reviewed. However if the radiograph is negative I do not believe he would need antibiotics at this time but if there is concern for a pneumonic process then he could possibly require antibiotics at that time. If there is any other changes noted on the chest radiograph he may need further evaluation and work-up here. Patient is agreeable to this plan. Otherwise he is resting comfortably stretcher no acute distress nontoxic-appearing. Discussed with the patient about the use of the steroids at which she was agreeable to. I discussed with him either a just starting oral steroids today or using a IM injection today and then starting orals tomorrow. Patient was agreeable to have time injection here. We will give him Solu-Medrol 60 mg IM advised him that the prescription of the steroid that we will write him he is not to start that until tomorrow since the injection will cover him for the next 24 hours. Advised that if he is diabetic and then this will cause his blood sugar to increase we may have to increase his medication and watch what he eats during the time that he is on the steroids he states his understanding of this.   Also advised they may increase his appetite and make his face feel little puffier montes than normal. He also states his understanding of this. Will advised the patient that since he does have albuterol inhaler at home already he should be taking 2 puffs 4 to 6 hours on this especially when he feels short of breath or when he feels like coughing because it can also help with the cough. But advised that it will open the airways up and if there is anything there to get out it will increase his coughing mechanism after using the inhaler. He does state his understanding of this. Rapid COVID screen was negative    Rapid influenza screen was negative    Portable chest radiograph read by me as there is noted atelectasis. There is no obvious consolidative process. No acute process with bony structures. No other obvious acute cardiopulmonary process. Portable chest radiograph read by radiologist also in agreement no acute disease    Patient's radiological diagnostic studies were discussed with him and he does state his understanding. After initial evaluation examination I did have conversation with the patient about upcoming plan, treatment possible disposition which they are agreeable to the times dictation. Advised that my read of the chest radiograph did not show any acute consolidative process. He does have some atelectasis noted. Advised that we would continue with our plan as dictated above. Advised that he uses albuterol inhaler 2 puffs every 4-6 hours as needed. We will also write him a prescription for some steroids 40 mg of prednisone daily for the next 5 days. We will also write him for Mucinex twice a day for the next 7 days. Patient is agreeable to this plan. The patient advised they do need to follow-up with a regular family physician within the next 1 to 2 days for evaluation. They are also given instructions of if symptoms worsens or new symptoms arise he should return back to emergency department for further evaluation work-up.     Is this patient to be included in the SEP-1 Core Measure due to severe sepsis or septic shock? No   Exclusion criteria - the patient is NOT to be included for SEP-1 Core Measure due to: Infection is not suspected          CONSULTS:  None    PROCEDURES:  Procedures    CRITICAL CARE TIME    Total Critical Care time was 0 minutes, excluding separately reportable procedures. There was a high probability of clinically significant/life threatening deterioration in the patient's condition which required my urgent intervention. FINAL IMPRESSION      1. Acute bronchitis with chronic obstructive pulmonary disease (COPD) ([facility]          DISPOSITION/PLAN   DISPOSITION Decision To Discharge 01/26/2023 09:11:36 AM      PATIENT REFERRED TO:  No follow-up provider specified. DISCHARGE MEDICATIONS:  New Prescriptions    GUAIFENESIN (MUCINEX) 600 MG EXTENDED RELEASE TABLET    Take 2 tablets by mouth 2 times daily for 7 days    PREDNISONE (DELTASONE) 20 MG TABLET    Take 2 tablets by mouth daily for 10 days       Comment: Please note this report has been produced using speech recognition software and may contain errorsrelated to that system including errors in grammar, punctuation, and spelling, as well as words and phrases that may be inappropriate. If there are any questions or concerns please feel free to contact the dictating providerfor clarification.     Oskar Tarango DO  Attending Emergency Physician               Oskar Tarango DO  01/26/23 8344

## 2023-01-26 NOTE — ED NOTES
AVS and Rx reviewed with patient, understanding verbalized. Patient encouraged to follow up with his pcp and /or return to ED as needed. Patient discharged from ED with no further needs or concerns voiced.      Hira Mcclure RN  01/26/23 8149

## 2023-01-26 NOTE — ED NOTES
Patient states that he wants to go home and go to bed, he doesn't feel good. He is sitting up on side of the stretcher, has removed himself from bp cuff and pulse ox and has his coat on. Patient is informed that doctor is waiting for chest xray results to come back and then he will come in and talk to him, understanding verbalized. Patient's son is brought to bedside at this time.      Liyah Reid RN  01/26/23 8158

## 2023-02-08 ENCOUNTER — APPOINTMENT (OUTPATIENT)
Dept: GENERAL RADIOLOGY | Facility: HOSPITAL | Age: 58
End: 2023-02-08
Payer: MEDICARE

## 2023-02-08 ENCOUNTER — HOSPITAL ENCOUNTER (EMERGENCY)
Facility: HOSPITAL | Age: 58
Discharge: HOME | End: 2023-02-08
Attending: EMERGENCY MEDICINE | Admitting: EMERGENCY MEDICINE
Payer: MEDICARE

## 2023-02-08 VITALS
WEIGHT: 175 LBS | OXYGEN SATURATION: 96 % | RESPIRATION RATE: 18 BRPM | HEART RATE: 71 BPM | HEIGHT: 72 IN | TEMPERATURE: 98.7 F | BODY MASS INDEX: 23.7 KG/M2 | SYSTOLIC BLOOD PRESSURE: 131 MMHG | DIASTOLIC BLOOD PRESSURE: 41 MMHG

## 2023-02-08 DIAGNOSIS — M79.604 PAIN IN BOTH LOWER EXTREMITIES: Primary | ICD-10-CM

## 2023-02-08 DIAGNOSIS — M79.605 PAIN IN BOTH LOWER EXTREMITIES: Primary | ICD-10-CM

## 2023-02-08 PROCEDURE — 73590 X-RAY EXAM OF LOWER LEG: CPT

## 2023-02-08 PROCEDURE — 71045 X-RAY EXAM CHEST 1 VIEW: CPT

## 2023-02-08 PROCEDURE — 99283 EMERGENCY DEPT VISIT LOW MDM: CPT

## 2023-02-08 PROCEDURE — 73552 X-RAY EXAM OF FEMUR 2/>: CPT

## 2023-02-08 PROCEDURE — 73630 X-RAY EXAM OF FOOT: CPT

## 2023-02-08 PROCEDURE — 72170 X-RAY EXAM OF PELVIS: CPT

## 2023-02-08 RX ORDER — NALOXONE HYDROCHLORIDE 4 MG/.1ML
1 SPRAY NASAL AS NEEDED
Qty: 1 EACH | Refills: 0 | Status: SHIPPED | OUTPATIENT
Start: 2023-02-08 | End: 2023-02-08 | Stop reason: SDUPTHER

## 2023-02-08 RX ORDER — OXYCODONE HYDROCHLORIDE AND ACETAMINOPHEN 5; 325 MG/1; MG/1
1 TABLET ORAL EVERY 6 HOURS PRN
Qty: 8 TABLET | Refills: 0 | Status: SHIPPED | OUTPATIENT
Start: 2023-02-08 | End: 2023-02-08 | Stop reason: SDUPTHER

## 2023-02-08 RX ORDER — NALOXONE HYDROCHLORIDE 4 MG/.1ML
1 SPRAY NASAL AS NEEDED
Qty: 2 EACH | Refills: 0 | Status: SHIPPED | OUTPATIENT
Start: 2023-02-08

## 2023-02-08 RX ORDER — OXYCODONE HYDROCHLORIDE AND ACETAMINOPHEN 5; 325 MG/1; MG/1
1 TABLET ORAL EVERY 6 HOURS PRN
Qty: 8 TABLET | Refills: 0 | Status: SHIPPED | OUTPATIENT
Start: 2023-02-08

## 2023-02-08 NOTE — ED PROVIDER NOTES
Subjective  History of Present Illness:    Chief Complaint: Bilateral lower extremity pain  History of Present Illness: 57-year-old male history of Charcot-Leigh-Tooth muscular atrophy, neuropathy, 2 days prior his lower extremity electric wheelchair rolled over him completed.  Bilateral lower extremities, swelling to the left lower extremity  Nurses Notes reviewed and agree, including vitals, allergies, social history and prior medical history.     REVIEW OF SYSTEMS: All systems reviewed and not pertinent unless noted.  Review of Systems      Positive for: Bilateral lower extremity pain left lower extremity swelling status post blunt injury    Negative for: Neck pain back pain chest pain abdominal pain    Past Medical History:   Diagnosis Date   • Arthritis    • Charcot Leigh Tooth muscular atrophy    • Colon polyps     REPORTS HISTORY   • COPD (chronic obstructive pulmonary disease) (Formerly [facility])    • Depression    • Difficulty reading    • Difficulty writing    • High cholesterol    • History of drug use     PATIENT REPORTS NO USE FOR OVER 15 YEARS   • Hypertension    • Mobility impaired     REPORTS WALKS VERY LITTLE.  REPORTS CAN'T FEEL HANDS AND FEET DUR TO CHARCOT LEIGH TOOTH.  USES A WHEELCHAIR AND CAN TRANSFER FROM CHAIR TO CHAIR.     • Muscular dystrophies (Formerly [facility])    • Stroke (Former[facility])     PATIENT REPORTS QUESTIONABLE EPISODE 6-7 YEARS AGO AND THAT HE WAS FLOWN FROM [place] TO [place].   • Wears glasses    • Wears partial dentures     UPPER PLATE       Allergies:    Patient has no known allergies.      Past Surgical History:   Procedure Laterality Date   • COLONOSCOPY N/A 9/12/2017    Procedure: COLONOSCOPY WITH POLYPECTOMY;  Surgeon: Geo Shannon MD;  Location: [facility] ENDOSCOPY;  Service:    • COLONOSCOPY W/ POLYPECTOMY     • ENDOSCOPY     • FRACTURE SURGERY Right     REPORTS HARDWARE IN PLACE   • FRACTURE SURGERY Bilateral     ANKLES, REPORTS HARDWARE IN PLACE IN BILATERAL ANKLES   • HERNIA REPAIR     • MOUTH  "SURGERY      EXTRACTIONS   • TONSILLECTOMY           Social History     Socioeconomic History   • Marital status:    Tobacco Use   • Smoking status: Some Days     Packs/day: 1.50     Years: 45.00     Pack years: 67.50     Types: Cigarettes   • Smokeless tobacco: Never   Vaping Use   • Vaping Use: Never used   Substance and Sexual Activity   • Alcohol use: No     Comment: REPORTS NO USE IN OVER 15 YEARS   • Drug use: Yes     Types: Methamphetamines, Benzodiazepines     Comment: occasionally   • Sexual activity: Defer         Family History   Problem Relation Age of Onset   • Heart disease Father    • Cancer Father        Objective  Physical Exam:  /41 (BP Location: Left arm, Patient Position: Lying)   Pulse 71   Temp 98.7 °F (37.1 °C) (Oral)   Resp 18   Ht 182.9 cm (72\")   Wt 79.4 kg (175 lb)   SpO2 96%   BMI 23.73 kg/m²      Physical Exam    CONSTITUTIONAL: Well developed, nontoxic 57-year-old male,  in no acute distress.  VITAL SIGNS: per nursing, reviewed and noted  SKIN: exposed skin with no rashes, ulcerations or petechiae  EYES: Grossly EOMI, no icterus  ENT: Normal voice.  Patient maintained wearing a mask throughout patient encounter due to coronavirus pandemic  RESPIRATORY:  No increased work of breathing. No retractions.   CARDIOVASCULAR:  regular rate and rhythm, no murmurs.  Good Peripheral pulses. Good cap refill to extremities.   GI: Abdomen soft, nontender, normal bowel sounds. No hernia. No ascites.  MUSCULOSKELETAL: Mild diffuse muscle wasting.  Diffuse bilateral lower extremity tenderness palpation with stable pelvis soft tissue swelling to the left lower extremity with soft compartments good distal pulses extremities pink and warm.  Superficial abrasion right medial calf NEUROLOGIC: Alert, oriented x 3. No gross deficits. GCS 15.   PSYCH: appropriate affect.  : no bladder tenderness or distention, no CVA tenderness    Procedures  No attending physician procedures were " performed on this patient.  ED Course:             Lab Results (last 24 hours)     ** No results found for the last 24 hours. **           XR Tibia Fibula 2 View Left    Result Date: 2/8/2023  PROCEDURE: XR TIBIA FIBULA 2 VW LEFT-  2 VIEW  HISTORY: blunt inury , pain  FINDINGS:  Two views show no evidence of an acute, displaced fracture or dislocation of the visualized bony architecture.  The joint spaces appear normal.      Impression: Unremarkable exam.    This report was signed and finalized on 2/8/2023 1:02 PM by Jem Harrison MD.    XR Tibia Fibula 2 View Right    Result Date: 2/8/2023  PROCEDURE: XR TIBIA FIBULA 2 VW RIGHT-  2 VIEW  HISTORY: blunt injury , pain  FINDINGS:  Two views show no evidence of an acute, displaced fracture or dislocation of the visualized bony architecture.  The joint spaces appear normal.      Impression: Unremarkable exam.    This report was signed and finalized on 2/8/2023 11:36 AM by Jem Harrison MD.    XR Femur 2 View Bilateral    Result Date: 2/8/2023  RIGHT FEMUR  THREE VIEW  HISTORY: blunt injury, pain  FINDINGS:  Three views show no evidence of an acute, displaced fracture or dislocation of the visualized bony architecture.  The joint spaces appear normal.      Impression: Unremarkable exam.   LEFT FEMUR  THREE VIEW  HISTORY: blunt injury, pain  FINDINGS:  Three views show no evidence of an acute, displaced fracture or dislocation of the visualized bony architecture.  The joint spaces appear normal.  IMPRESSION: Unremarkable exam.    This report was signed and finalized on 2/8/2023 11:38 AM by Jem Harrison MD.    XR Chest 1 View    Result Date: 2/8/2023  PROCEDURE: XR CHEST 1 VW-  HISTORY: Preoperative for respiratory clearance  COMPARISON:  09/01/2022  FINDINGS:  Portable view of the chest demonstrates the lungs to be grossly clear. There is no evidence of effusion, pneumothorax or other significant pleural disease. The mediastinum is unremarkable.  The heart size is  normal.      Impression: Unremarkable portable chest.  This report was signed and finalized on 2/8/2023 11:45 AM by Jem Harrison MD.    XR Pelvis 1 or 2 View    Result Date: 2/8/2023  PROCEDURE: XR PELVIS 1 OR 2 VW-  1 VIEW  HISTORY: blunt injury, left inguinal pain  FINDINGS:  One view shows no evidence of an acute, displaced fracture or dislocation of the visualized bony architecture. The joint spaces appear normal.      Impression: Unremarkable exam.  Should symptoms persist, CT or MRI may be considered.  This report was signed and finalized on 2/8/2023 11:39 AM by Jem Harrison MD.    XR Foot 3+ View Bilateral    Result Date: 2/8/2023  RIGHT FOOT  THREE VIEW  HISTORY: blunt injury, pain,  FINDINGS:  Three views show no evidence of an acute, displaced fracture or dislocation of the visualized bony architecture.  Scattered mild degenerative changes are noted. Bony mineralization is normal. There are no erosions.      Impression: No acute bony abnormality    LEFT FOOT  THREE VIEW  HISTORY: blunt injury, pain,  FINDINGS:  Three views show no evidence of an acute, displaced fracture or dislocation of the visualized bony architecture.  Scattered mild degenerative changes are noted. Bony mineralization is normal. There are no erosions.  IMPRESSION: No acute bony abnormality    This report was signed and finalized on 2/8/2023 1:02 PM by Jem Harrison MD.         [facility]    Initial impression of presenting illness: 57-year-old male presents for evaluation of bilateral lower extremity pain/injury after his electric wheelchair rolled over him    DDX: includes but is not limited to: Sprain strain contusion fracture dislocation compartment syndrome    Patient arrives normotensive afebrile no tachycardia oxygen saturations 96% with vitals interpreted by myself.     Pertinent features from physical exam: Compartments soft, left lower extremity swelling primarily below the knee and including the foot.  No calf tenderness.  Good  distal pulses good cap refill.  No nail injuries.    Initial diagnostic plan: Plain films bilateral lower extremity from pelvis to the foot, chest x-ray in case preop    Results from initial plan were reviewed and interpreted by me revealing no fractures or acute findings on pelvis bilateral femur tib-fib's and foot    Diagnostic information from other sources: None    Interventions / Re-evaluation: Patient requesting short-term pain medication Lan reviewed    Results/clinical rationale were discussed with patient and family member    Consultations/Discussion of results with other physicians: None    Disposition plan: Short course Percocet, last narcotic prescription October 2022 supportive care outpatient follow-up precautions discussed-----    Final diagnoses:   Pain in both lower extremities        Renaldo Oro, DO  02/08/23 [id number]

## 2023-03-16 ENCOUNTER — APPOINTMENT (OUTPATIENT)
Dept: CT IMAGING | Facility: HOSPITAL | Age: 58
End: 2023-03-16
Payer: MEDICARE

## 2023-03-16 ENCOUNTER — HOSPITAL ENCOUNTER (EMERGENCY)
Facility: HOSPITAL | Age: 58
Discharge: HOME | End: 2023-03-16
Attending: HOSPITALIST
Payer: MEDICARE

## 2023-03-16 ENCOUNTER — APPOINTMENT (OUTPATIENT)
Dept: GENERAL RADIOLOGY | Facility: HOSPITAL | Age: 58
End: 2023-03-16
Payer: MEDICARE

## 2023-03-16 VITALS
RESPIRATION RATE: 22 BRPM | TEMPERATURE: 98.2 F | WEIGHT: 194 LBS | HEART RATE: 89 BPM | BODY MASS INDEX: 27.06 KG/M2 | DIASTOLIC BLOOD PRESSURE: 90 MMHG | OXYGEN SATURATION: 97 % | SYSTOLIC BLOOD PRESSURE: 136 MMHG

## 2023-03-16 DIAGNOSIS — H93.13 TINNITUS OF BOTH EARS: ICD-10-CM

## 2023-03-16 DIAGNOSIS — J18.9 PNEUMONIA OF RIGHT UPPER LOBE DUE TO INFECTIOUS ORGANISM: Primary | ICD-10-CM

## 2023-03-16 DIAGNOSIS — R51.9 NONINTRACTABLE HEADACHE, UNSPECIFIED CHRONICITY PATTERN, UNSPECIFIED HEADACHE TYPE: ICD-10-CM

## 2023-03-16 DIAGNOSIS — H53.8 BLURRY VISION, RIGHT EYE: ICD-10-CM

## 2023-03-16 LAB
ALBUMIN SERPL-MCNC: 3.3 G/DL (ref 3.4–4.8)
ALBUMIN/GLOB SERPL: 1 {RATIO} (ref 0.8–2)
ALP SERPL-CCNC: 94 U/L (ref 25–100)
ALT SERPL-CCNC: 28 U/L (ref 4–36)
ANION GAP SERPL CALCULATED.3IONS-SCNC: 9 MMOL/L (ref 3–16)
AST SERPL-CCNC: 26 U/L (ref 8–33)
BASOPHILS # BLD: 0.1 K/UL (ref 0–0.1)
BASOPHILS NFR BLD: 0.2 %
BILIRUB SERPL-MCNC: 1.3 MG/DL (ref 0.3–1.2)
BUN SERPL-MCNC: 17 MG/DL (ref 6–20)
CALCIUM SERPL-MCNC: 8.9 MG/DL (ref 8.5–10.5)
CHLORIDE SERPL-SCNC: 105 MMOL/L (ref 98–107)
CO2 SERPL-SCNC: 25 MMOL/L (ref 20–30)
CREAT SERPL-MCNC: 0.8 MG/DL (ref 0.4–1.2)
EOSINOPHIL # BLD: 0.1 K/UL (ref 0–0.4)
EOSINOPHIL NFR BLD: 0.3 %
ERYTHROCYTE [DISTWIDTH] IN BLOOD BY AUTOMATED COUNT: 14.4 % (ref 11–16)
FLUAV AG NPH QL: NEGATIVE
FLUBV AG NPH QL: NEGATIVE
GFR SERPLBLD CREATININE-BSD FMLA CKD-EPI: >60 ML/MIN/{1.73_M2}
GLOBULIN SER CALC-MCNC: 3.2 G/DL
GLUCOSE SERPL-MCNC: 111 MG/DL (ref 74–106)
HCT VFR BLD AUTO: 40.3 % (ref 40–54)
HGB BLD-MCNC: 13.5 G/DL (ref 13–18)
IMM GRANULOCYTES # BLD: 0.2 K/UL
IMM GRANULOCYTES NFR BLD: 0.6 % (ref 0–5)
LYMPHOCYTES # BLD: 2.3 K/UL (ref 1.5–4)
LYMPHOCYTES NFR BLD: 9.3 %
MCH RBC QN AUTO: 31.3 PG (ref 27–32)
MCHC RBC AUTO-ENTMCNC: 33.5 G/DL (ref 31–35)
MCV RBC AUTO: 93.5 FL (ref 80–100)
MONOCYTES # BLD: 2.5 K/UL (ref 0.2–0.8)
MONOCYTES NFR BLD: 10 %
NEUTROPHILS # BLD: 19.6 K/UL (ref 2–7.5)
NEUTS SEG NFR BLD: 79.6 %
PLATELET # BLD AUTO: 216 K/UL (ref 150–400)
PMV BLD AUTO: 10.9 FL (ref 6–10)
POTASSIUM SERPL-SCNC: 3.9 MMOL/L (ref 3.4–5.1)
PROT SERPL-MCNC: 6.5 G/DL (ref 6.4–8.3)
RBC # BLD AUTO: 4.31 M/UL (ref 4.5–6)
SARS-COV-2 RDRP RESP QL NAA+PROBE: NOT DETECTED
SODIUM SERPL-SCNC: 139 MMOL/L (ref 136–145)
TROPONIN I SERPL-MCNC: <0.3 NG/ML
WBC # BLD AUTO: 24.6 K/UL (ref 4–11)

## 2023-03-16 PROCEDURE — 99285 EMERGENCY DEPT VISIT HI MDM: CPT

## 2023-03-16 PROCEDURE — 87804 INFLUENZA ASSAY W/OPTIC: CPT

## 2023-03-16 PROCEDURE — 80053 COMPREHEN METABOLIC PANEL: CPT

## 2023-03-16 PROCEDURE — 87040 BLOOD CULTURE FOR BACTERIA: CPT

## 2023-03-16 PROCEDURE — 6360000004 HC RX CONTRAST MEDICATION: Performed by: HOSPITALIST

## 2023-03-16 PROCEDURE — 36415 COLL VENOUS BLD VENIPUNCTURE: CPT

## 2023-03-16 PROCEDURE — 6370000000 HC RX 637 (ALT 250 FOR IP): Performed by: HOSPITALIST

## 2023-03-16 PROCEDURE — 2580000003 HC RX 258: Performed by: HOSPITALIST

## 2023-03-16 PROCEDURE — 85025 COMPLETE CBC W/AUTO DIFF WBC: CPT

## 2023-03-16 PROCEDURE — 71045 X-RAY EXAM CHEST 1 VIEW: CPT

## 2023-03-16 PROCEDURE — 87635 SARS-COV-2 COVID-19 AMP PRB: CPT

## 2023-03-16 PROCEDURE — 70496 CT ANGIOGRAPHY HEAD: CPT

## 2023-03-16 PROCEDURE — 70498 CT ANGIOGRAPHY NECK: CPT

## 2023-03-16 PROCEDURE — 84484 ASSAY OF TROPONIN QUANT: CPT

## 2023-03-16 RX ORDER — 0.9 % SODIUM CHLORIDE 0.9 %
1000 INTRAVENOUS SOLUTION INTRAVENOUS ONCE
Status: COMPLETED | OUTPATIENT
Start: 2023-03-16 | End: 2023-03-16

## 2023-03-16 RX ORDER — LEVOFLOXACIN 500 MG/1
500 TABLET, FILM COATED ORAL ONCE
Status: COMPLETED | OUTPATIENT
Start: 2023-03-16 | End: 2023-03-16

## 2023-03-16 RX ORDER — LEVOFLOXACIN 500 MG/1
500 TABLET, FILM COATED ORAL DAILY
Qty: 10 TABLET | Refills: 0 | Status: SHIPPED | OUTPATIENT
Start: 2023-03-16 | End: 2023-03-26

## 2023-03-16 RX ADMIN — LEVOFLOXACIN 500 MG: 500 TABLET, FILM COATED ORAL at 18:09

## 2023-03-16 RX ADMIN — SODIUM CHLORIDE 1000 ML: 9 INJECTION, SOLUTION INTRAVENOUS at 15:30

## 2023-03-16 RX ADMIN — IOPAMIDOL 100 ML: 755 INJECTION, SOLUTION INTRAVENOUS at 16:35

## 2023-03-16 NOTE — ED NOTES
Discharge instructions provided to patient. Patient verbalized understanding of d/c plan and follow up care. PIV removed. Patient asked to sit in wheelchair and wait in lobby for ride home. Patient assisted to wheelchair and taken to lobby. No further needs noted.       Asya Cornejo RN  03/16/23 [id number]

## 2023-03-16 NOTE — ED NOTES
Report received from [facility]. Patient is a 59year old male with respiratory distress, new and worsening cough with blood. Temperature 99.1. SPO2 94% on room air. Nothing further at this time.       Tamela Tarango RN  03/16/23 [id number]

## 2023-03-16 NOTE — ED NOTES
Noted asymmetry  of mouth, reports that he is not sure if this is new or not, him and son both report NO change in speech. Dr. Lele Moreno at bedside. Pt informed Dr. Lele Moreno that the blurred vision has been worse x 1 hour, has been present  for  A couple of days. Pt also reported that he has has ear ringing, and vertigo for several days.        Vicki Washington RN  03/16/23 [id number]

## 2023-03-16 NOTE — ED PROVIDER NOTES
[facility] ENCOUNTER      Pt Name: Gianna Borrego  MRN: [id number]  YOB: 1965  Date of evaluation: 3/16/2023  Provider: Alpesh Varela, [facility]       Chief Complaint   Patient presents with    Shortness of Breath         HISTORY OF PRESENT ILLNESS  (Location/Symptom, Timing/Onset, Context/Setting, Quality, Duration, Modifying Factors, Severity.)   Gianna Borrego is a 59 y.o. male who presents to the emergency department for shortness of breath but then had multiple complaints afterwards. 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 Patient states that he saw his family doctor about 3 weeks ago he advised him that he was having headaches and some ringing in his ears at that time he states they told him that it would probably go away on its own but he states is persisted. Patient states that he has been having blurry vision for the past 3 days but that got worse within the last hour. He states he laid down and took a nap and upon awaking around 145 he states he had increased blurry vision this also concerned him so this was one of the reasons why also called EMS. While in the room the patient was actually opening and closing his eyes and he states is actually his right eye that causes the blurry vision he says he can see fine out of his left eye. Patient states he just recently saw his eye doctor and got new prescription. Patient states he has had increased cough and shortness of breath for the past 3 days. States has been coughing up \"gallons\" of stuff from his lungs.   He states it really does not have any color to it but the last time that he coughed up a large amount of sputum he states it sort it was blood-tinged this also concerned him was one of the reasons he also decided to call EMS. The patient is not on any oxygen at home. He states he used to be on albuterol and what he describes sounds like Spiriva and Symbicort. Patient states that he is now just on Trelegy and he states it is not working for him like his other medications were. He states he had this changed about 3 weeks ago when he saw his family doctor but has not been able to follow back up with him the let him know that it does not seem to be working for him. Patient states he did have episode of vomiting about a day and a half ago but states he is really not been able to eat or drink much because he just has not felt very well. Patient states when he had the blurry vision that was worse he also has episodes of vertigo where he states he feels like the room is just spinning. He denies any vertiginous symptoms now at this time. Patient denies any body aches out of the ordinary. He denies any dysuria or change in her frequency. Positive sick contacts with his neighbor. Patient states he feels like he has had fevers and chills but his thermometer at home is not working so he is not able to check himself. Patient denies any numbness tingling weakness of extremities out of ordinary. He denies any numbness or tingling to the face. Denies any fall trauma or injury out of the ordinary. Patient does have a electric wheelchair that he uses to get around with. Patient states he does not know how to read or write but he can see lettering and it is his right eye this causing the blurriness not his left. Patient past medical history significant for coronary artery disease, cancer, Charcot-Lakia-Tooth disease, congestive heart failure, colon cancer, COPD, myocardial infarction and muscular dystrophy. Patient states that that he is down to half a pack a day for smoking.   States he smoked for 53 years but when he was smoking when he was doing it like a job he states he was smoking 2 packs a day.      Nursing notes were reviewed. REVIEW OFSYSTEMS    (2-9 systems for level 4, 10 or more for level 5)   ROS:  General:  + fevers, no chills, no weakness  Cardiovascular:  No chest pain, no palpitations  Respiratory:  +shortness of breath, + cough-productive, no wheezing  Gastrointestinal:  No pain, no nausea, no vomiting, no diarrhea  Musculoskeletal:  No muscle pain, no joint pain  Skin:  No rash, no easy bruising  Neurologic:  No speech problems, + headache, no extremity weakness, +blurry vision-right eye  Psychiatric:  No anxiety  Genitourinary:  No dysuria, no hematuria  ENT: +bilateral ringing in the ears    Except as noted above the remainder of the review of systems was reviewed and negative. PAST MEDICAL HISTORY     Past Medical History:   Diagnosis Date    CAD (coronary artery disease)     Cancer ([facility] [address]     Charcot-Lakia-Tooth disease     CHF (congestive heart failure) (formerly [facility])     Colon cancer (HCC)     COPD (chronic obstructive pulmonary disease) ([facility] [address]     MI (myocardial infarction) ([facility] [address]     x2    Muscular dystrophies ([facility] [address]          SURGICAL HISTORY       Past Surgical History:   Procedure Laterality Date    ANKLE SURGERY      bilateral    ARM SURGERY Bilateral     Crush injury    BRAIN SURGERY      skull injury, metal palte in head    COLONOSCOPY      patient states colon cancer    FRACTURE SURGERY      JOINT REPLACEMENT      left wrist    TONSILLECTOMY           CURRENT MEDICATIONS       Previous Medications    ALBUTEROL SULFATE HFA (PROVENTIL;VENTOLIN;PROAIR) 108 (90 BASE) MCG/ACT INHALER    Inhale 2 puffs into the lungs Every 4-6 hours as needed.     BUDESONIDE-FORMOTEROL (SYMBICORT) 160-4.5 MCG/ACT AERO    Inhale 2 puffs into the lungs 2 times daily    CETIRIZINE (ZYRTEC) 10 MG TABLET    Take 10 mg by mouth daily    CLONIDINE (CATAPRES) 0.1 MG TABLET    Take 0.1 mg by mouth daily    FOLIC ACID (FOLVITE) 1 MG TABLET    Take 1 tablet by mouth daily    GABAPENTIN (NEURONTIN) 300 MG CAPSULE TAKE ONE CAPSULE BY MOUTH THREE TIMES A DAY    LATANOPROST (XALATAN) 0.005 % OPHTHALMIC SOLUTION    Place 1 drop into both eyes nightly    LISINOPRIL (PRINIVIL;ZESTRIL) 30 MG TABLET    Take 0.5 tablets by mouth daily    NITROGLYCERIN (NITROSTAT) 0.4 MG SL TABLET    Place 1 tablet under the tongue every 5 minutes as needed for Chest pain up to max of 3 total doses. If no relief after 1 dose, call 911. PROPRANOLOL (INDERAL) 10 MG TABLET    Take 10 mg by mouth 2 times daily    TIOTROPIUM (SPIRIVA HANDIHALER) 18 MCG INHALATION CAPSULE    Inhale 1 capsule into the lungs daily       ALLERGIES     Patient has no known allergies. FAMILY HISTORY       Family History   Problem Relation Age of Onset    Other Father         charcogi johnston tooth    Heart Attack Father     Cancer Father         unknown    Heart Attack Brother           SOCIAL HISTORY       Social History     Socioeconomic History    Marital status:      Spouse name: None    Number of children: None    Years of education: None    Highest education level: None   Tobacco Use    Smoking status: Every Day     Packs/day: 0.50     Years: 50.00     Pack years: 25.00     Types: Cigarettes    Smokeless tobacco: Never   Substance and Sexual Activity    Alcohol use: Not Currently     Comment: 3 beers yesterday    Drug use: Not Currently         PHYSICAL EXAM    (up to 7 for level 4, 8 or more for level 5)     ED Triage Vitals   BP Temp Temp src Pulse Resp SpO2 Height Weight   -- -- -- -- -- -- -- --       Physical Exam  General :Patient is awake, alert, oriented, in no acute distress, nontoxic appearing  HEENT: Pupils are equally round and reactive to light, EOMI, conjunctivae clear. Oral mucosa is moist, no exudate. Uvula is midline, lateral tympanic membranes canals are normal in appearance.   Neck: Neck is supple, full range of motion, trachea midline, no audible bruit noted  Cardiac: Heart regular rate, rhythm, no murmurs, rubs, or gallops  Lungs: Lungs are clear to auscultation, there is no wheezing, rhonchi, or rales. There is no use of accessory muscles. Abdomen: Abdomen is soft, nontender, nondistended. There is no firm or pulsatile masses, no rebound rigidity or guarding. Musculoskeletal: No focal muscle deficits are appreciated  Neuro: Motor intact, sensory intact, level of consciousness is normal   Dermatology: Skin is warm and dry  Psych: Mentation is grossly normal, cognition is grossly normal. Affect is appropriate. DIAGNOSTIC RESULTS     EKG: All EKG's are interpreted by the Emergency Department Physician who either signs or Co-signs this chart in the [address] a cardiologist.    The EKG interpreted by me shows sinus rhythm. Heart rate is 90 bpm, MA interval is 153 ms, QRS duration is 93, QT is 358 and QTc is 439 ms. No acute ST elevations concerning for acute myocardial infarction. No ST depressions concerning for acute myocardial ischemia. RADIOLOGY:   Non-plain film images such as CT, Ultrasound and MRI are read by the radiologist. Plain radiographic images are visualized and preliminarily interpreted by the emergency physician with the below findings:      [] Radiologist's Report Reviewed:  CTA NECK W CONTRAST   Final Result      Airspace disease in right upper lobe suggestive of pneumonia. No significant vascular pathology identified in the neck. PROCEDURE: CT ANGIOGRAPHY HEAD WITH/WITHOUT CONTRAST      INDICATION: headches, blurry vision right eye      COMPARISON: none      TECHNIQUE: Axial CT imaging obtained through the head prior to and following administration of IV contrast. Axial images, multiplanar reformatted images, and maximum intensity projection images were reviewed for CT angiographic technique. IV contrast: 100 mL Isovue-370. FINDINGS:      ANTERIOR CIRCULATION: The intracranial internal carotid arteries, anterior cerebral arteries, and middle cerebral arteries demonstrate no occlusion or stenosis.  No evidence for aneurysm or arteriovenous malformation. POSTERIOR CIRCULATION: The bilateral vertebral arteries, basilar artery and posterior cerebral arteries demonstrate no occlusion or stenosis. No evidence for aneurysm or arteriovenous malformation. INTRACRANIAL VENOUS SYSTEM: No evidence for intracranial venous thrombosis. INTRACRANIAL HEMORRHAGE: None. VENTRICLES: Normal in size and configuration for age. BRAIN PARENCHYMA: Gray-white matter differentiation is normal. No intracranial mass effect. SKULL: No destructive osseous process or fracture. PARANASAL SINUSES / MASTOIDS: No acute sinusitis or mastoiditis. ORBITS: Normal.      EXTRACRANIAL SOFT TISSUES: Normal.      OTHER: None. IMPRESSION:      Unremarkable CTA of the head with no significant intracranial vascular pathology identified. CTA HEAD W CONTRAST   Final Result      Airspace disease in right upper lobe suggestive of pneumonia. No significant vascular pathology identified in the neck. PROCEDURE: CT ANGIOGRAPHY HEAD WITH/WITHOUT CONTRAST      INDICATION: headches, blurry vision right eye      COMPARISON: none      TECHNIQUE: Axial CT imaging obtained through the head prior to and following administration of IV contrast. Axial images, multiplanar reformatted images, and maximum intensity projection images were reviewed for CT angiographic technique. IV contrast: 100 mL Isovue-370. FINDINGS:      ANTERIOR CIRCULATION: The intracranial internal carotid arteries, anterior cerebral arteries, and middle cerebral arteries demonstrate no occlusion or stenosis. No evidence for aneurysm or arteriovenous malformation. POSTERIOR CIRCULATION: The bilateral vertebral arteries, basilar artery and posterior cerebral arteries demonstrate no occlusion or stenosis. No evidence for aneurysm or arteriovenous malformation.       INTRACRANIAL VENOUS SYSTEM: No evidence for intracranial venous thrombosis. INTRACRANIAL HEMORRHAGE: None. VENTRICLES: Normal in size and configuration for age. BRAIN PARENCHYMA: Gray-white matter differentiation is normal. No intracranial mass effect. SKULL: No destructive osseous process or fracture. PARANASAL SINUSES / MASTOIDS: No acute sinusitis or mastoiditis. ORBITS: Normal.      EXTRACRANIAL SOFT TISSUES: Normal.      OTHER: None. IMPRESSION:      Unremarkable CTA of the head with no significant intracranial vascular pathology identified. XR CHEST PORTABLE   Final Result   1.  Right perihilar groundglass airspace disease suspected            ED BEDSIDE ULTRASOUND:   Performed by ED Physician - none    LABS:    I have reviewed and interpreted all of the currently available lab results from this visit (ifapplicable):  Results for orders placed or performed during the hospital encounter of 03/16/23   COVID-19, Rapid    Specimen: Nasopharyngeal Swab   Result Value Ref Range    SARS-CoV-2, NAAT Not Detected Not Detected   Rapid Influenza A/B Antigens    Specimen: Nasopharyngeal   Result Value Ref Range    Rapid Influenza A Ag Negative Negative    Rapid Influenza B Ag Negative Negative   CBC with Auto Differential   Result Value Ref Range    WBC 24.6 (H) 4.0 - 11.0 K/uL    RBC 4.31 (L) 4.50 - 6.00 M/uL    Hemoglobin 13.5 13.0 - 18.0 g/dL    Hematocrit 40.3 40.0 - 54.0 %    MCV 93.5 80.0 - 100.0 fL    MCH 31.3 27.0 - 32.0 pg    MCHC 33.5 31.0 - 35.0 g/dL    RDW 14.4 11.0 - 16.0 %    Platelets 938 051 - 914 K/uL    MPV 10.9 (H) 6.0 - 10.0 fL    Neutrophils % 79.6 %    Immature Granulocytes % 0.6 0.0 - 5.0 %    Lymphocytes % 9.3 %    Monocytes % 10.0 %    Eosinophils % 0.3 %    Basophils % 0.2 %    Neutrophils Absolute 19.6 (H) 2.0 - 7.5 K/uL    Immature Granulocytes # 0.2 K/uL    Lymphocytes Absolute 2.3 1.5 - 4.0 K/uL    Monocytes Absolute 2.5 (H) 0.2 - 0.8 K/uL    Eosinophils Absolute 0.1 0.0 - 0.4 K/uL    Basophils Absolute 0.1 0.0 - 0.1 K/uL   Comprehensive Metabolic Panel w/ Reflex to MG   Result Value Ref Range    Sodium 139 136 - 145 mmol/L    Potassium reflex Magnesium 3.9 3.4 - 5.1 mmol/L    Chloride 105 98 - 107 mmol/L    CO2 25 20 - 30 mmol/L    Anion Gap 9 3 - 16    Glucose 111 (H) 74 - 106 mg/dL    BUN 17 6 - 20 mg/dL    Creatinine 0.8 0.4 - 1.2 mg/dL    Est, Glom Filt Rate >60 >59    Calcium 8.9 8.5 - 10.5 mg/dL    Total Protein 6.5 6.4 - 8.3 g/dL    Albumin 3.3 (L) 3.4 - 4.8 g/dL    Albumin/Globulin Ratio 1.0 0.8 - 2.0    Total Bilirubin 1.3 (H) 0.3 - 1.2 mg/dL    Alkaline Phosphatase 94 25 - 100 U/L    ALT 28 4 - 36 U/L    AST 26 8 - 33 U/L    Globulin 3.2 Not Established g/dL   Troponin   Result Value Ref Range    Troponin <0.30 <0.30 ng/mL        All other labs were within normal range or not returned as of this dictation.    EMERGENCY DEPARTMENT COURSE and DIFFERENTIAL DIAGNOSIS/MDM:   Vitals:    Vitals:    03/16/23 1615 03/16/23 1627 03/16/23 1754   BP: 127/83  (!) 136/90   Pulse:   90   Resp:   24   SpO2: 93% 96% 95%       MEDICATIONS ADMINISTERED IN ED:  Medications   levoFLOXacin (LEVAQUIN) tablet 500 mg (has no administration in time range)   0.9 % sodium chloride bolus (0 mLs IntraVENous Stopped 3/16/23 1631)   iopamidol (ISOVUE-370) 76 % injection 100 mL (100 mLs IntraVENous Given 3/16/23 1635)       After initial evaluation examination I did have conversation with the patient about upcoming plan, treatment possible disposition which they are agreeable to the times dictation.  Patient advised that we perform CTA of the head/neck with contrast for evaluation of his blurred vision these continual headaches that he has had on and off for several weeks to months along with the vertiginous type symptoms that he has been having along with the tinnitus.  However advised that we may not be able to find an answer for this and there may be some that he would have to follow-up with his regular family physician for or neurology he does  state his understanding of this. Patient advised that once we get the CT of the head and neck back as long as there is no acute abnormality then we can give him something like Toradol IV for his headache. But the headache does not want to bother more than gets a cough and the shortness of breath that seems to be his chief complaint that brought him in today the other things are just things just so happened to have been also going on. Patient with portable chest radiograph performed to rule out any acute pneumonic type process. I suspect his symptoms are prime more consistent with a COPD exacerbation. Patient will have CBC, CMP, troponin, blood culture x1, rapid COVID and influenza swab performed. Patient's pulse ox has been 92 and higher while he has been here. He is resting comfortably stretcher no acute distress nontoxic-appearing. Final disposition be determined once his radiological diagnostic studies been performed reviewed. Rapid COVID screen was negative    Rapid influenza screen was negative    Blood work showed white count 24,600, hemoglobin is 13.5, hematocrit 40.3, platelet counts 295. Comprehensive metabolic panel was benign except for glucose 111 which is mildly elevated. Total bilirubin also mildly elevated at 1.3 with upper limit being 1.2. Troponin was nondetectable less than 0.30. Portable chest radiograph read by radiology as right perihilar groundglass airspace disease suspected. CTA of the head and neck with contrast read by radiology as unremarkable CTA of the head with no significant intracranial vascular pathology identified. No significant vascular pathology identified in the neck. Patient's radiological diagnostic studies were discussed with him and he does state his understanding. Patient advised of the findings of the CT of the head and neck were normal there was no acute findings there. No reason that we can find for the patient's headache which has been having. We will reassess to see if he needs something prior to discharge for the headache we would most likely give him some IV Toradol if needed. Patient does have pneumonia on chest radiograph. Patient was offered admission to the hospital but he declined he states he cannot leave his son at home by himself because he has mental disability and history of seizure/epilepsy so he states he is never left him alone since he has been born. Patient requesting that we send prescription for his Levaquin to his pharmacy because they will deliver tomorrow at 4:00. Patient advised that we give him 500 mg of Levaquin here orally this will cover him until tomorrow to when he can get his prescription filled. Patient vies to continue with his inhaled medications I had a long discussion with him that he needs to contact his regular family physician about the changes in his inhaled medications he does not believe that it is working as well as what his previous course was but I am not sure or know why he changed the patient's respiratory medications to the [facility]. Patient advised that he should continue with what he is on at this time unless he has those other medications that he could go back to until he can follow back up but he does need to let his family doctor know because there is a reason why he switched him but I am not privy to that at this time. Otherwise again the patient was offered hospitalization but he declined because he cannot leave his son at home. Patient be discharged home in stable condition per his request.      The patient advised he does need to follow-up with his regular family physician within the next 1 to 2 days for evaluation. He was also given instructions if symptoms worsens or new symptoms arise he should return back to emergency department for further evaluation work-up. Is this patient to be included in the SEP-1 Core Measure due to severe sepsis or septic shock?    No   Exclusion criteria - the patient is NOT to be included for SEP-1 Core Measure due to:  May have criteria for sepsis, but does not meet criteria for severe sepsis or septic shock          CONSULTS:  None    PROCEDURES:  Procedures    CRITICAL CARE TIME    Total Critical Care time was 0 minutes, excluding separately reportable procedures. There was a high probability of clinically significant/life threatening deterioration in the patient's condition which required my urgent intervention. FINAL IMPRESSION      1. Pneumonia of right upper lobe due to infectious organism    2. Nonintractable headache, unspecified chronicity pattern, unspecified headache type    3. Tinnitus of both ears    4. Blurry vision, right eye          DISPOSITION/PLAN   DISPOSITION Decision To Discharge 03/16/2023 06:01:12 PM      PATIENT REFERRED TO:  VY Zamudio  [address] [facility]  [phone]    In 2 days      [facility] Emergency Department  [facility] [address] [facility]  [phone]    As needed, If symptoms worsen    DISCHARGE MEDICATIONS:  New Prescriptions    LEVOFLOXACIN (LEVAQUIN) 500 MG TABLET    Take 1 tablet by mouth daily for 10 days       Comment: Please note this report has been produced using speech recognition software and may contain errorsrelated to that system including errors in grammar, punctuation, and spelling, as well as words and phrases that may be inappropriate. If there are any questions or concerns please feel free to contact the dictating providerfor clarification.     Maurisio Rachel DO  Attending Emergency Physician               Maurisio Rachel DO  03/16/23 [id number]

## 2023-03-16 NOTE — ED TRIAGE NOTES
Pt arrived via [facility]. With reports of SOA x 2-3 days, fever, cough. Reports blurry vision started about 1 hour ago. Pt has history of Charcot-Lakia-Tooth disease.

## 2023-03-18 LAB — BACTERIA BLD CULT: NORMAL

## 2023-03-21 LAB — BACTERIA BLD CULT: NORMAL

## 2023-07-14 NOTE — H&P (VIEW-ONLY)
Patient: Gene Lawton    YOB: 1965    Date: 07/18/2023    Primary Care Provider: Bruno Hughes PA    Chief Complaint   Patient presents with    Colon Cancer Screening     Consult- colonoscopy, last colonoscopy -201       SUBJECTIVE:    History of present illness:  Patient has a history significant for COPD and he has a history of colon polyps.  I saw the patient in the office today as a consultation for a colonoscopy.  Patient's last colonoscopy was performed 09/12/2017 at which time he did have a sigmoid colon polyp removed.    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    The following portions of the patient's history were reviewed and updated as appropriate: allergies, current medications, past family history, past medical history, past social history, past surgical history and problem list.    Review of Systems   Constitutional:  Negative for chills, fever and unexpected weight change.   HENT:  Negative for trouble swallowing and voice change.    Eyes:  Negative for visual disturbance.   Respiratory:  Negative for apnea, cough, chest tightness, shortness of breath and wheezing.    Cardiovascular:  Negative for chest pain, palpitations and leg swelling.   Gastrointestinal:  Negative for abdominal distention, abdominal pain, anal bleeding, blood in stool, constipation, diarrhea, nausea, rectal pain and vomiting.   Endocrine: Negative for cold intolerance and heat intolerance.   Genitourinary:  Negative for difficulty urinating, dysuria, flank pain, scrotal swelling and testicular pain.   Musculoskeletal:  Negative for back pain, gait problem and joint swelling.   Skin:  Negative for color change, rash and wound.   Neurological:  Negative for dizziness, syncope, speech difficulty, weakness, numbness and headaches.   Hematological:  Negative for adenopathy. Does not bruise/bleed easily.    Psychiatric/Behavioral:  Negative for confusion. The patient is not nervous/anxious.      History:  Past Medical History:   Diagnosis Date    Arthritis     Charcot Leigh Tooth muscular atrophy     Colon polyps     REPORTS HISTORY    COPD (chronic obstructive pulmonary disease)     Depression     Difficulty reading     Difficulty writing     High cholesterol     History of drug use     PATIENT REPORTS NO USE FOR OVER 15 YEARS    Hypertension     Mobility impaired     REPORTS WALKS VERY LITTLE.  REPORTS CAN'T FEEL HANDS AND FEET DUR TO CHARCOT LEIGH TOOTH.  USES A WHEELCHAIR AND CAN TRANSFER FROM CHAIR TO CHAIR.      Muscular dystrophies     Stroke     PATIENT REPORTS QUESTIONABLE EPISODE 6-7 YEARS AGO AND THAT HE WAS FLOWN FROM [place] TO [place].    Wears glasses     Wears partial dentures     UPPER PLATE       Past Surgical History:   Procedure Laterality Date    COLONOSCOPY N/A 9/12/2017    Procedure: COLONOSCOPY WITH POLYPECTOMY;  Surgeon: Geo Shannon MD;  Location: [facility] ENDOSCOPY;  Service:     COLONOSCOPY W/ POLYPECTOMY      ENDOSCOPY      FRACTURE SURGERY Right     REPORTS HARDWARE IN PLACE    FRACTURE SURGERY Bilateral     ANKLES, REPORTS HARDWARE IN PLACE IN BILATERAL ANKLES    HERNIA REPAIR      MOUTH SURGERY      EXTRACTIONS    TONSILLECTOMY         Family History   Problem Relation Age of Onset    Heart disease Father     Cancer Father        Social History     Tobacco Use    Smoking status: Some Days     Packs/day: 1.50     Years: 45.00     Pack years: 67.50     Types: Cigarettes     Passive exposure: Current    Smokeless tobacco: Never   Vaping Use    Vaping Use: Never used   Substance Use Topics    Alcohol use: No     Comment: REPORTS NO USE IN OVER 15 YEARS    Drug use: Yes     Types: Methamphetamines, Benzodiazepines     Comment: occasionally       Allergies:  No Known Allergies    Medications:    Current Outpatient Medications:     albuterol sulfate  (90 Base) MCG/ACT inhaler, ,  "Disp: , Rfl:     aspirin 81 MG EC tablet, Take 1 tablet by mouth Daily., Disp: , Rfl:     cetirizine (zyrTEC) 10 MG tablet, , Disp: , Rfl:     cloNIDine (CATAPRES) 0.1 MG tablet, Take 1 tablet by mouth 2 (Two) Times a Day for 30 days., Disp: 60 tablet, Rfl: 0    gabapentin (NEURONTIN) 300 MG capsule, 1 capsule 3 (Three) Times a Day., Disp: , Rfl:     gabapentin (NEURONTIN) 300 MG capsule, Take 3 capsules by mouth 3 (Three) Times a Day., Disp: 270 capsule, Rfl: 3    latanoprost (XALATAN) 0.005 % ophthalmic solution, Instill 1 drop in both eyes at bedtime, Disp: , Rfl:     lisinopril (PRINIVIL,ZESTRIL) 30 MG tablet, , Disp: , Rfl:     nitroglycerin (NITROSTAT) 0.4 MG SL tablet, Place 1 tablet under the tongue., Disp: , Rfl:     oxyCODONE (ROXICODONE) 10 MG tablet, TAKE ONE TABLET FOUR TIMES DAILY (available fill 07/06/23), Disp: , Rfl:     silver sulfadiazine (SILVADENE, SSD) 1 % cream, Apply 1 application  topically to the appropriate area as directed 2 (Two) Times a Day., Disp: , Rfl:     Spiriva HandiHaler 18 MCG per inhalation capsule, , Disp: , Rfl:     Trelegy Ellipta 200-62.5-25 MCG/ACT aerosol powder , , Disp: , Rfl:     naloxone (NARCAN) 4 MG/0.1ML nasal spray, Call 911. [place] into nostril upon signs of overdose. May repeat in 2-3 minutes in other nostril if no or minimal breathing and responsiveness. (Patient not taking: Reported on 7/18/2023), Disp: 2 each, Rfl: 0    OBJECTIVE:    Vital Signs:   Vitals:    07/18/23 1459   BP: 118/80   Pulse: 103   Temp: 97.4 øF (36.3 øC)   SpO2: 96%   Weight: 83.5 kg (184 lb)   Height: 182.9 cm (72\")       Physical Exam:   General Appearance:    Alert, cooperative, in no acute distress   Head:    Normocephalic, without obvious abnormality, atraumatic   Eyes:            Lids and lashes normal, conjunctivae and sclerae normal, no   icterus, no pallor, corneas clear, PERRL   Ears:    Ears appear intact with no abnormalities noted   Throat:   No oral lesions, no thrush, oral " mucosa moist   Neck:   No adenopathy, supple, trachea midline, no thyromegaly,  no JVD   Lungs:     Clear to auscultation,respirations regular, even and                  unlabored    Heart:    Regular rhythm and normal rate, normal S1 and S2, no            murmur   Abdomen:     no masses, no organomegaly, soft non-tender, non-distended, no guarding, there is no evidence of tenderness, no peritoneal signs   Extremities:   Moves all extremities well, no edema, no cyanosis, no             redness   Pulses:   Pulses palpable and equal bilaterally   Skin:   No bleeding, bruising or rash   Lymph nodes:   No palpable adenopathy   Neurologic:   Cranial nerves 2 - 12 grossly intact, sensation intact      Results Review:   I reviewed the patient's new clinical results.  I reviewed the patient's new imaging results and agree with the interpretation.  I reviewed the patient's other test results and agree with the interpretation    Review of Systems was reviewed and confirmed as accurate as documented by the MA.    ASSESSMENT/PLAN:    1. CMT (Charcot-Leigh-Tooth disease)    2. Adenomatous polyp of colon, unspecified part of colon        I recommend a colonoscopy for further evaluation. The procedure was explained as well as the risks which include but are not limited to bleeding, infection, perforation, abdominal pain etc. The patient understands these risks and the procedure and wishes to proceed.     Electronically signed by Geo Shannon MD  07/18/23 15:38 EDT

## 2023-07-19 PROBLEM — D12.6 ADENOMATOUS POLYP OF COLON: Status: ACTIVE | Noted: 2023-07-19

## 2023-08-04 ENCOUNTER — TELEPHONE (OUTPATIENT)
Dept: SURGERY | Facility: CLINIC | Age: 58
End: 2023-08-04
Payer: MEDICARE

## 2023-08-08 NOTE — PRE-PROCEDURE INSTRUCTIONS
PAT phone history completed with pt for upcoming procedure on  8/9/23 with Dr. Shannon.    PAT PASS GIVEN/REVIEWED WITH PT.  VERBALIZED UNDERSTANDING OF THE FOLLOWING:  DO NOT EAT, DRINK, SMOKE, USE SMOKELESS TOBACCO OR CHEW GUM AFTER MIDNIGHT THE NIGHT BEFORE SURGERY.  THIS ALSO INCLUDES HARD CANDIES AND MINTS.    DO NOT SHAVE THE AREA TO BE OPERATED ON AT LEAST 48 HOURS PRIOR TO THE PROCEDURE.  DO NOT WEAR MAKE UP OR NAIL POLISH.  DO NOT LEAVE IN ANY PIERCING OR WEAR JEWELRY THE DAY OF SURGERY.      DO NOT USE ADHESIVES IF YOU WEAR DENTURES.    DO NOT WEAR EYE CONTACTS; BRING IN YOUR GLASSES.    ONLY TAKE MEDICATION THE MORNING OF YOUR PROCEDURE IF INSTRUCTED BY YOUR SURGEON WITH ENOUGH WATER TO SWALLOW THE MEDICATION.  IF YOUR SURGEON DID NOT SPECIFY WHICH MEDICATIONS TO TAKE, YOU WILL NEED TO CALL THEIR OFFICE FOR FURTHER INSTRUCTIONS AND DO AS THEY INSTRUCT.    LEAVE ANYTHING YOU CONSIDER VALUABLE AT HOME.    YOU WILL NEED TO ARRANGE FOR SOMEONE TO DRIVE YOU HOME AFTER SURGERY.  IT IS RECOMMENDED THAT YOU DO NOT DRIVE, WORK, DRINK ALCOHOL OR MAKE MAJOR DECISIONS FOR AT LEAST 24 HOURS AFTER YOUR PROCEDURE IS COMPLETE.      THE DAY OF YOUR PROCEDURE, BRING IN THE FOLLOWING IF APPLICABLE:   PICTURE ID AND INSURANCE/MEDICARE OR MEDICAID CARDS/ANY CO-PAY THAT MAY BE DUE   COPY OF ADVANCED DIRECTIVE/LIVING WILL/POWER OR    CPAP/BIPAP/INHALERS   SKIN PREP SHEET   YOUR PREADMISSION TESTING PASS (IF NOT A PHONE HISTORY)

## 2023-08-09 ENCOUNTER — ANESTHESIA EVENT (OUTPATIENT)
Dept: GASTROENTEROLOGY | Facility: HOSPITAL | Age: 58
End: 2023-08-09
Payer: MEDICARE

## 2023-08-09 ENCOUNTER — ANESTHESIA (OUTPATIENT)
Dept: GASTROENTEROLOGY | Facility: HOSPITAL | Age: 58
End: 2023-08-09
Payer: MEDICARE

## 2023-08-09 ENCOUNTER — HOSPITAL ENCOUNTER (OUTPATIENT)
Facility: HOSPITAL | Age: 58
Setting detail: HOSPITAL OUTPATIENT SURGERY
Discharge: HOME | End: 2023-08-09
Attending: SURGERY | Admitting: SURGERY
Payer: MEDICARE

## 2023-08-09 VITALS
OXYGEN SATURATION: 95 % | RESPIRATION RATE: 18 BRPM | HEART RATE: 89 BPM | SYSTOLIC BLOOD PRESSURE: 112 MMHG | DIASTOLIC BLOOD PRESSURE: 82 MMHG | TEMPERATURE: 97.3 F

## 2023-08-09 PROCEDURE — 25010000002 PROPOFOL 10 MG/ML EMULSION: Performed by: NURSE ANESTHETIST, CERTIFIED REGISTERED

## 2023-08-09 RX ORDER — PROPOFOL 10 MG/ML
VIAL (ML) INTRAVENOUS AS NEEDED
Status: DISCONTINUED | OUTPATIENT
Start: 2023-08-09 | End: 2023-08-09 | Stop reason: SURG

## 2023-08-09 RX ORDER — LIDOCAINE HYDROCHLORIDE 20 MG/ML
INJECTION, SOLUTION INTRAVENOUS AS NEEDED
Status: DISCONTINUED | OUTPATIENT
Start: 2023-08-09 | End: 2023-08-09 | Stop reason: SURG

## 2023-08-09 RX ORDER — SODIUM CHLORIDE, SODIUM LACTATE, POTASSIUM CHLORIDE, CALCIUM CHLORIDE 600; 310; 30; 20 MG/100ML; MG/100ML; MG/100ML; MG/100ML
1000 INJECTION, SOLUTION INTRAVENOUS CONTINUOUS
Status: DISCONTINUED | OUTPATIENT
Start: 2023-08-09 | End: 2023-08-09 | Stop reason: HOSPADM

## 2023-08-09 RX ADMIN — SODIUM CHLORIDE, POTASSIUM CHLORIDE, SODIUM LACTATE AND CALCIUM CHLORIDE 1000 ML: 600; 310; 30; 20 INJECTION, SOLUTION INTRAVENOUS at 06:37

## 2023-08-09 RX ADMIN — PROPOFOL 160 MCG/KG/MIN: 10 INJECTION, EMULSION INTRAVENOUS at 07:42

## 2023-08-09 RX ADMIN — PROPOFOL 100 MG: 10 INJECTION, EMULSION INTRAVENOUS at 07:42

## 2023-08-09 RX ADMIN — LIDOCAINE HYDROCHLORIDE 60 MG: 20 INJECTION, SOLUTION INTRAVENOUS at 07:42

## 2023-08-09 NOTE — ANESTHESIA PREPROCEDURE EVALUATION
Anesthesia Evaluation     Patient summary reviewed and Nursing notes reviewed   no history of anesthetic complications:   NPO Solid Status: > 8 hours  NPO Liquid Status: > 8 hours           Airway   Mallampati: II  TM distance: >3 FB  Neck ROM: full  no difficulty expected and Possible difficult intubation  Dental - normal exam     Pulmonary - normal exam   (+) COPD,  Cardiovascular - normal exam    Rhythm: regular  Rate: normal    (+) hypertension, CADangina, hyperlipidemia      Neuro/Psych  (+) CVA, numbness, psychiatric history  GI/Hepatic/Renal/Endo    (+) renal disease    Musculoskeletal     Abdominal    Substance History - negative use     OB/GYN negative ob/gyn ROS         Other   arthritis,                   Anesthesia Plan    ASA 3     MAC     (Pt told that intravenous sedation will be used as the primary anesthetic along with local anesthesia if necessary. Every effort will be made to make sure the patient is comfortable.     The patient was told they may or may not have recall for the procedure. It was further explained that if the MAC was not adequate that a general anesthetic with either an LMA or endotracheal tube would be required.     Will proceed with the plan of care.)  intravenous induction     Anesthetic plan, risks, benefits, and alternatives have been provided, discussed and informed consent has been obtained with: patient.    CODE STATUS:

## 2023-08-09 NOTE — DISCHARGE INSTRUCTIONS
No pushing, pulling, tugging,  heavy lifting, or strenuous activity.  No major decision making, driving, or drinking alcoholic beverages for 24 hours. ( due to the medications you have  received)  Always use good hand hygiene/washing techniques.  NO driving while taking pain medications.    * if you have an incision:  Check your incision area every day for signs of infection.   Check for:  * more redness, swelling, or pain  *more fluid or blood  *warmth  *pus or bad smell  To assist you in voiding:  Drink plenty of fluids  Listen to running water while attempting to void.    If you are unable to urinate and you have an uncomfortable urge to void or it has been   6 hours since you were discharged, return to the Emergency Room

## 2023-08-09 NOTE — ANESTHESIA POSTPROCEDURE EVALUATION
Patient: Gene Lawton    Procedure Summary       Date: 08/09/23 Room / Location: [facility] ENDOSCOPY 3 / [facility] ENDOSCOPY    Anesthesia Start: 0740 Anesthesia Stop: 0800    Procedure: COLONOSCOPY Diagnosis:       CMT (Charcot-Leigh-Tooth disease)      Adenomatous polyp of colon, unspecified part of colon      (CMT (Charcot-Leigh-Tooth disease) [G60.0])      (Adenomatous polyp of colon, unspecified part of colon [D12.6])    Surgeons: Geo Shannon MD Provider: Dylon White CRNA    Anesthesia Type: MAC ASA Status: 3            Anesthesia Type: MAC    Vitals  No vitals data found for the desired time range.          Post Anesthesia Care and Evaluation    Patient location during evaluation: bedside  Patient participation: complete - patient participated  Level of consciousness: awake and alert  Pain score: 0  Pain management: satisfactory to patient    Airway patency: patent  Anesthetic complications: No anesthetic complications  PONV Status: none  Cardiovascular status: acceptable and stable  Respiratory status: acceptable  Hydration status: acceptable    Comments: Vitals signs as noted in nursing documentation as per protocol.

## 2023-08-14 ENCOUNTER — APPOINTMENT (OUTPATIENT)
Dept: CT IMAGING | Facility: HOSPITAL | Age: 58
End: 2023-08-14
Payer: MEDICARE

## 2023-08-14 ENCOUNTER — TELEPHONE (OUTPATIENT)
Dept: SURGERY | Facility: CLINIC | Age: 58
End: 2023-08-14
Payer: MEDICARE

## 2023-08-14 ENCOUNTER — HOSPITAL ENCOUNTER (EMERGENCY)
Facility: HOSPITAL | Age: 58
Discharge: HOME | End: 2023-08-14
Attending: EMERGENCY MEDICINE | Admitting: EMERGENCY MEDICINE
Payer: MEDICARE

## 2023-08-14 VITALS
RESPIRATION RATE: 16 BRPM | HEART RATE: 87 BPM | SYSTOLIC BLOOD PRESSURE: 132 MMHG | BODY MASS INDEX: 26.34 KG/M2 | DIASTOLIC BLOOD PRESSURE: 95 MMHG | WEIGHT: 184 LBS | OXYGEN SATURATION: 93 % | TEMPERATURE: 98.1 F | HEIGHT: 70 IN

## 2023-08-14 DIAGNOSIS — R10.13 EPIGASTRIC PAIN: Primary | ICD-10-CM

## 2023-08-14 LAB
ALBUMIN SERPL-MCNC: 3.8 G/DL (ref 3.5–5.2)
ALBUMIN/GLOB SERPL: 1.2 G/DL
ALP SERPL-CCNC: 83 U/L (ref 39–117)
ALT SERPL W P-5'-P-CCNC: 80 U/L (ref 1–41)
ANION GAP SERPL CALCULATED.3IONS-SCNC: 10.5 MMOL/L (ref 5–15)
AST SERPL-CCNC: 89 U/L (ref 1–40)
BASOPHILS # BLD AUTO: 0.05 10*3/MM3 (ref 0–0.2)
BASOPHILS NFR BLD AUTO: 0.5 % (ref 0–1.5)
BILIRUB SERPL-MCNC: 0.9 MG/DL (ref 0–1.2)
BUN SERPL-MCNC: 13 MG/DL (ref 8–23)
BUN/CREAT SERPL: 21 (ref 7–25)
CALCIUM SPEC-SCNC: 9 MG/DL (ref 8.6–10.5)
CHLORIDE SERPL-SCNC: 106 MMOL/L (ref 98–107)
CO2 SERPL-SCNC: 22.5 MMOL/L (ref 22–29)
CREAT SERPL-MCNC: 0.62 MG/DL (ref 0.76–1.27)
DEPRECATED RDW RBC AUTO: 45.5 FL (ref 37–54)
EGFRCR SERPLBLD CKD-EPI 2021: 109.4 ML/MIN/1.73
EOSINOPHIL # BLD AUTO: 0.17 10*3/MM3 (ref 0–0.4)
EOSINOPHIL NFR BLD AUTO: 1.7 % (ref 0.3–6.2)
ERYTHROCYTE [DISTWIDTH] IN BLOOD BY AUTOMATED COUNT: 13.3 % (ref 12.3–15.4)
GLOBULIN UR ELPH-MCNC: 3.2 GM/DL
GLUCOSE SERPL-MCNC: 101 MG/DL (ref 65–99)
HCT VFR BLD AUTO: 44.4 % (ref 37.5–51)
HGB BLD-MCNC: 15.3 G/DL (ref 13–17.7)
HOLD SPECIMEN: NORMAL
IMM GRANULOCYTES # BLD AUTO: 0.04 10*3/MM3 (ref 0–0.05)
IMM GRANULOCYTES NFR BLD AUTO: 0.4 % (ref 0–0.5)
LIPASE SERPL-CCNC: 27 U/L (ref 13–60)
LYMPHOCYTES # BLD AUTO: 2.66 10*3/MM3 (ref 0.7–3.1)
LYMPHOCYTES NFR BLD AUTO: 26.2 % (ref 19.6–45.3)
MCH RBC QN AUTO: 31.9 PG (ref 26.6–33)
MCHC RBC AUTO-ENTMCNC: 34.5 G/DL (ref 31.5–35.7)
MCV RBC AUTO: 92.7 FL (ref 79–97)
MONOCYTES # BLD AUTO: 1.36 10*3/MM3 (ref 0.1–0.9)
MONOCYTES NFR BLD AUTO: 13.4 % (ref 5–12)
NEUTROPHILS NFR BLD AUTO: 5.89 10*3/MM3 (ref 1.7–7)
NEUTROPHILS NFR BLD AUTO: 57.8 % (ref 42.7–76)
NRBC BLD AUTO-RTO: 0 /100 WBC (ref 0–0.2)
PLATELET # BLD AUTO: 278 10*3/MM3 (ref 140–450)
PMV BLD AUTO: 11.2 FL (ref 6–12)
POTASSIUM SERPL-SCNC: 3.9 MMOL/L (ref 3.5–5.2)
PROT SERPL-MCNC: 7 G/DL (ref 6–8.5)
RBC # BLD AUTO: 4.79 10*6/MM3 (ref 4.14–5.8)
SODIUM SERPL-SCNC: 139 MMOL/L (ref 136–145)
TROPONIN T SERPL HS-MCNC: 6 NG/L
WBC NRBC COR # BLD: 10.17 10*3/MM3 (ref 3.4–10.8)
WHOLE BLOOD HOLD COAG: NORMAL
WHOLE BLOOD HOLD SPECIMEN: NORMAL

## 2023-08-14 PROCEDURE — 74176 CT ABD & PELVIS W/O CONTRAST: CPT

## 2023-08-14 PROCEDURE — 96375 TX/PRO/DX INJ NEW DRUG ADDON: CPT

## 2023-08-14 PROCEDURE — 96374 THER/PROPH/DIAG INJ IV PUSH: CPT

## 2023-08-14 PROCEDURE — 25010000002 MORPHINE PER 10 MG: Performed by: EMERGENCY MEDICINE

## 2023-08-14 PROCEDURE — 84484 ASSAY OF TROPONIN QUANT: CPT | Performed by: EMERGENCY MEDICINE

## 2023-08-14 PROCEDURE — 25010000002 ONDANSETRON PER 1 MG: Performed by: EMERGENCY MEDICINE

## 2023-08-14 PROCEDURE — 80053 COMPREHEN METABOLIC PANEL: CPT | Performed by: EMERGENCY MEDICINE

## 2023-08-14 PROCEDURE — 96361 HYDRATE IV INFUSION ADD-ON: CPT

## 2023-08-14 PROCEDURE — 99284 EMERGENCY DEPT VISIT MOD MDM: CPT

## 2023-08-14 PROCEDURE — 93005 ELECTROCARDIOGRAM TRACING: CPT | Performed by: EMERGENCY MEDICINE

## 2023-08-14 PROCEDURE — 85025 COMPLETE CBC W/AUTO DIFF WBC: CPT | Performed by: EMERGENCY MEDICINE

## 2023-08-14 PROCEDURE — 83690 ASSAY OF LIPASE: CPT | Performed by: EMERGENCY MEDICINE

## 2023-08-14 RX ORDER — ONDANSETRON 2 MG/ML
4 INJECTION INTRAMUSCULAR; INTRAVENOUS ONCE
Status: COMPLETED | OUTPATIENT
Start: 2023-08-14 | End: 2023-08-14

## 2023-08-14 RX ORDER — SODIUM CHLORIDE 0.9 % (FLUSH) 0.9 %
10 SYRINGE (ML) INJECTION AS NEEDED
Status: DISCONTINUED | OUTPATIENT
Start: 2023-08-14 | End: 2023-08-14 | Stop reason: HOSPADM

## 2023-08-14 RX ORDER — PANTOPRAZOLE SODIUM 40 MG/1
40 TABLET, DELAYED RELEASE ORAL DAILY
Qty: 30 TABLET | Refills: 0 | Status: SHIPPED | OUTPATIENT
Start: 2023-08-14

## 2023-08-14 RX ADMIN — SODIUM CHLORIDE 1000 ML: 9 INJECTION, SOLUTION INTRAVENOUS at 15:50

## 2023-08-14 RX ADMIN — ONDANSETRON 4 MG: 2 INJECTION INTRAMUSCULAR; INTRAVENOUS at 15:50

## 2023-08-14 RX ADMIN — MORPHINE SULFATE 4 MG: 4 INJECTION, SOLUTION INTRAMUSCULAR; INTRAVENOUS at 15:51

## 2023-08-14 NOTE — TELEPHONE ENCOUNTER
[facility] staff attempted to follow warm transfer process and was unsuccessful     Caller: Gene Lawton    Relationship to patient: Self    Best call back number: [phone]    Patient is needing: TO SPEAK TO [facility] UNABLE TO WARM TRANSFER

## 2023-08-14 NOTE — TELEPHONE ENCOUNTER
"I returned pt's call, he stated \"I had my colonoscopy done on the 8th and I am having bad pain in both my ribs in my rib cages, it just started and I had a shingles shot too but I don't know what could be causing the pain.\"  Pt will go to  [facility] for evaluation.  "

## 2023-08-14 NOTE — ED PROVIDER NOTES
Subjective   History of Present Illness  58-year-old male presents to ED for chief complaint of abdominal pain.  Patient was triaged as chest pain but he is actually complaining of epigastric pain.  States that it goes around his lower ribs.  Dull aching pain has been present for a few days.  He has had associated nausea.  No vomiting.  No diarrhea.  No fever or chills.  States that he feels bloated.  No other complaints at this time.    Review of Systems   Cardiovascular:  Negative for chest pain.   Gastrointestinal:  Positive for abdominal distention, abdominal pain and nausea. Negative for diarrhea and vomiting.   All other systems reviewed and are negative.    Past Medical History:   Diagnosis Date    Arthritis     Charcot Leigh Tooth muscular atrophy     Colon polyps     REPORTS HISTORY    COPD (chronic obstructive pulmonary disease)     Depression     Difficulty reading     Difficulty writing     High cholesterol     History of drug use     PATIENT REPORTS NO USE FOR OVER 15 YEARS    Hypertension     Medical non-compliance     Mobility impaired     REPORTS WALKS VERY LITTLE.  REPORTS CAN'T FEEL HANDS AND FEET DUR TO CHARCOT LEIGH TOOTH.  USES A WHEELCHAIR AND CAN TRANSFER FROM CHAIR TO CHAIR.      Muscular dystrophies     Stroke     PATIENT REPORTS QUESTIONABLE EPISODE 6-7 YEARS AGO AND THAT HE WAS FLOWN FROM [place] TO [place].    Wears glasses     Wears partial dentures     UPPER PLATE       No Known Allergies    Past Surgical History:   Procedure Laterality Date    COLONOSCOPY N/A 09/12/2017    Procedure: COLONOSCOPY WITH POLYPECTOMY;  Surgeon: Geo Shannon MD;  Location: [facility] ENDOSCOPY;  Service:     COLONOSCOPY N/A 8/9/2023    Procedure: COLONOSCOPY;  Surgeon: Geo Shannon MD;  Location: [facility] ENDOSCOPY;  Service: Gastroenterology;  Laterality: N/A;    COLONOSCOPY W/ POLYPECTOMY      ENDOSCOPY      FRACTURE SURGERY Right     REPORTS HARDWARE IN PLACE arm    FRACTURE SURGERY Bilateral      ANKLES, REPORTS HARDWARE IN PLACE IN BILATERAL ANKLES    HERNIA REPAIR      ventral with mesh    MOUTH SURGERY      EXTRACTIONS    TONSILLECTOMY         Family History   Problem Relation Age of Onset    Heart disease Father     Cancer Father        Social History     Socioeconomic History    Marital status:    Tobacco Use    Smoking status: Some Days     Packs/day: 0.50     Years: 45.00     Pack years: 22.50     Types: Cigarettes     Passive exposure: Current    Smokeless tobacco: Never   Vaping Use    Vaping Use: Never used   Substance and Sexual Activity    Alcohol use: No     Comment: no abuse in over 21 years    Drug use: Not Currently     Types: Methamphetamines, Benzodiazepines     Comment: pt denies any recent use 8/8/23    Sexual activity: Defer           Objective   Physical Exam  Vitals and nursing note reviewed.   Constitutional:       Appearance: He is well-developed.   HENT:      Head: Normocephalic and atraumatic.   Cardiovascular:      Rate and Rhythm: Normal rate and regular rhythm. Tachycardia present.      Heart sounds: Normal heart sounds.   Pulmonary:      Effort: Pulmonary effort is normal.      Breath sounds: Normal breath sounds.   Abdominal:      General: There is distension.      Palpations: Abdomen is soft.      Tenderness: generalized  and tenderness in the epigastric area   Neurological:      General: No focal deficit present.      Mental Status: He is alert and oriented to person, place, and time.       Procedures           ED Course  ED Course as of 08/15/23 0741   Mon Aug 14, 2023   1554 EKG interpreted by me.  Sinus rhythm.  Tachycardic.  Rate of 111.  No obvious ST or T wave changes.  Abnormal EKG. [CG]      ED Course User Index  [CG] Ty Yi, DO                                           Medical Decision Making  Problems Addressed:  Epigastric pain: complicated acute illness or injury    Amount and/or Complexity of Data Reviewed  Labs: ordered.  Radiology:  ordered.  ECG/medicine tests: ordered.    Risk  Prescription drug management.    Chief complaint: Abdominal pain    Differential diagnosis includes but not limited to: Pancreatitis, GERD, gastritis, bowel obstruction, other    Patient arrives stable, afebrile, without respiratory distress with initial vitals interpreted by myself.  Pertinent initial vitals include tachycardic, hypertensive    Plan: Symptomatic treatment, labs, CT imaging    Results from initial plan were reviewed and interpreted by myself and include: Labs are reassuring.  Imaging is negative for acute process.    Consultations: NA    Reevaluation resting comfortably.  Pain is improved    Discussion: Epigastric abdominal pain.  Suspect parotitis versus gastritis versus other.  Labs and imaging are reassuring.  He was treated symptomatically.  Suspect GERD.  Appropriate for discharge to follow-up outpatient as needed.  Patient [place] to this plan.  Diagnostic information from other sources includes: Review of previous visits, prior labs, prior imaging, available notes from prior evaluations or visits with specialists, medication list, allergies, past medical history, past surgical history    Interventions in the ED included: Pain management.  Protonix.      Disposition plan:discharge    Final diagnoses:   Epigastric pain       ED Disposition  ED Disposition       ED Disposition   Discharge    Condition   Stable    Comment   --               Bruno Hughes PA  [address]  [place] [id number] [phone]               Medication List        New Prescriptions      pantoprazole 40 MG EC tablet  Commonly known as: PROTONIX  Take 1 tablet by mouth Daily.               Where to Get Your Medications        These medications were sent to [facility] - [place], [place] - [address] [place] [address] - [phone] PH - [phone] [address] [address]      Phone: [phone]   pantoprazole 40 MG EC tablet            Ty Yi,  DO  08/15/23 0741

## 2023-08-15 RX ORDER — TAMSULOSIN HYDROCHLORIDE 0.4 MG/1
1 CAPSULE ORAL DAILY
Qty: 30 CAPSULE | Refills: 0 | Status: SHIPPED | OUTPATIENT
Start: 2023-08-15

## 2023-08-22 NOTE — H&P (VIEW-ONLY)
Patient: Gene Lawton    YOB: 1965    Date: 08/23/2023    Primary Care Provider: Bruno Hughes PA    Chief Complaint   Patient presents with    Heartburn    Hernia     Hiatal        SUBJECTIVE:    History of present illness:  I saw the patient in the office today as a consultation for evaluation and treatment of GERD.  Patient was seen in the emergency department at [facility] 08/14/2023 at which time he complained of epigastric area abdominal pain.  CT scan of the abdomen and pelvis was performed which showed constipation, a small hiatal hernia and mild periaortic adenopathy. He does have some regurgitation of acid at times. He has had reflux for the past 20+ years. His last EGD was over 21 years ago.    He was seen in the emergency room recently, he did have fairly severe epigastric abdominal discomfort.  He did have a colonoscopy performed in August of this year and benign polyps were removed.    The following portions of the patient's history were reviewed and updated as appropriate: allergies, current medications, past family history, past medical history, past social history, past surgical history and problem list.    Review of Systems   Constitutional:  Negative for chills, fever and unexpected weight change.   HENT:  Negative for trouble swallowing and voice change.    Eyes:  Negative for visual disturbance.   Respiratory:  Negative for apnea, cough, chest tightness, shortness of breath and wheezing.    Cardiovascular:  Negative for chest pain, palpitations and leg swelling.   Gastrointestinal:  Negative for abdominal distention, abdominal pain, anal bleeding, blood in stool, constipation, diarrhea, nausea, rectal pain and vomiting.   Endocrine: Negative for cold intolerance and heat intolerance.   Genitourinary:  Negative for difficulty urinating, dysuria, flank pain, scrotal swelling and testicular pain.   Musculoskeletal:  Negative for back pain, gait problem and joint  swelling.   Skin:  Negative for color change, rash and wound.   Neurological:  Negative for dizziness, syncope, speech difficulty, weakness, numbness and headaches.   Hematological:  Negative for adenopathy. Does not bruise/bleed easily.   Psychiatric/Behavioral:  Negative for confusion. The patient is not nervous/anxious.      History:  Past Medical History:   Diagnosis Date    Arthritis     Charcot Leigh Tooth muscular atrophy     Colon polyps     REPORTS HISTORY    COPD (chronic obstructive pulmonary disease)     Depression     Difficulty reading     Difficulty writing     High cholesterol     History of drug use     PATIENT REPORTS NO USE FOR OVER 15 YEARS    Hypertension     Medical non-compliance     Mobility impaired     REPORTS WALKS VERY LITTLE.  REPORTS CAN'T FEEL HANDS AND FEET DUR TO CHARCOT LEIGH TOOTH.  USES A WHEELCHAIR AND CAN TRANSFER FROM CHAIR TO CHAIR.      Muscular dystrophies     Stroke     PATIENT REPORTS QUESTIONABLE EPISODE 6-7 YEARS AGO AND THAT HE WAS FLOWN FROM [place] TO [place].    Wears glasses     Wears partial dentures     UPPER PLATE       Past Surgical History:   Procedure Laterality Date    COLONOSCOPY N/A 09/12/2017    Procedure: COLONOSCOPY WITH POLYPECTOMY;  Surgeon: Geo Shannon MD;  Location: [facility] ENDOSCOPY;  Service:     COLONOSCOPY N/A 8/9/2023    Procedure: COLONOSCOPY;  Surgeon: Geo Shannon MD;  Location: [facility] ENDOSCOPY;  Service: Gastroenterology;  Laterality: N/A;    COLONOSCOPY W/ POLYPECTOMY      ENDOSCOPY      FRACTURE SURGERY Right     REPORTS HARDWARE IN PLACE arm    FRACTURE SURGERY Bilateral     ANKLES, REPORTS HARDWARE IN PLACE IN BILATERAL ANKLES    HERNIA REPAIR      ventral with mesh    MOUTH SURGERY      EXTRACTIONS    TONSILLECTOMY         Family History   Problem Relation Age of Onset    Heart disease Father     Cancer Father        Social History     Tobacco Use    Smoking status: Some Days     Packs/day: 0.50     Years: 45.00     Pack years:  "22.50     Types: Cigarettes     Passive exposure: Current    Smokeless tobacco: Never   Vaping Use    Vaping Use: Never used   Substance Use Topics    Alcohol use: No     Comment: no abuse in over 21 years    Drug use: Not Currently     Types: Methamphetamines, Benzodiazepines     Comment: pt denies any recent use 8/8/23       Allergies:  No Known Allergies    Medications:    Current Outpatient Medications:     albuterol sulfate  (90 Base) MCG/ACT inhaler, Inhale 1 puff Every 4 (Four) Hours As Needed., Disp: , Rfl:     bisacodyl (Dulcolax) 5 MG EC tablet, Take 2 @ 3pm, 2 @ 7 pm day prior to colonoscopy, Disp: 4 tablet, Rfl: 0    gabapentin (NEURONTIN) 300 MG capsule, 1 capsule 3 (Three) Times a Day., Disp: , Rfl:     latanoprost (XALATAN) 0.005 % ophthalmic solution, Instill 1 drop in both eyes at bedtime, Disp: , Rfl:     oxyCODONE (ROXICODONE) 10 MG tablet, TAKE ONE TABLET FOUR TIMES DAILY (available fill 07/06/23), Disp: , Rfl:     pantoprazole (PROTONIX) 40 MG EC tablet, Take 1 tablet by mouth Daily., Disp: 30 tablet, Rfl: 0    tamsulosin (FLOMAX) 0.4 MG capsule 24 hr capsule, Take 1 capsule by mouth Daily., Disp: 30 capsule, Rfl: 0    OBJECTIVE:    Vital Signs:   Vitals:    08/23/23 1327   BP: 146/94   Pulse: (!) 137   SpO2: 97%   Weight: 83.5 kg (184 lb)   Height: 180.3 cm (71\")       Physical Exam:   General Appearance:    Alert, cooperative, in no acute distress   Head:    Normocephalic, without obvious abnormality, atraumatic   Eyes:            Lids and lashes normal, conjunctivae and sclerae normal, no   icterus, no pallor, corneas clear, PERRLA   Ears:    Ears appear intact with no abnormalities noted   Throat:   No oral lesions, no thrush, oral mucosa moist   Neck:   No adenopathy, supple, trachea midline, no thyromegaly, no   carotid bruit, no JVD   Lungs:     Clear to auscultation,respirations regular, even and                  unlabored    Heart:    Regular rhythm and normal rate, normal S1 and " S2, no            murmur, no gallop, no rub, no click   Chest Wall:    No abnormalities observed   Abdomen:     Normal bowel sounds, no masses, no organomegaly, soft        non-tender, non-distended, no guarding, there is evidence of epigastric  tenderness, no peritoneal signs   Extremities:   Moves all extremities well, no edema, no cyanosis, no             redness   Pulses:   Pulses palpable and equal bilaterally   Skin:   No bleeding, bruising or rash   Lymph nodes:   No palpable adenopathy   Neurologic:   Cranial nerves 2 - 12 grossly intact, sensation intact     Results Review:   I reviewed the patient's new clinical results.    Review of Systems was reviewed and confirmed as accurate as documented by the MA.    ASSESSMENT/PLAN:    1. Epigastric pain    2. Right upper quadrant abdominal pain        I did have a detailed and extensive discussion with the patient in the office and they understand that they need to undergo upper endoscopy. Full risks and benefits of operative versus nonoperative intervention were discussed with the patient and these include bleeding and esophageal injury. The patient understands, agrees, and wishes to proceed with the surgical treatment plan as mentioned above. The patient had no questions for me at the end of the discussion.       I discussed the patients findings and my recommendations with patient.     He did have a gallbladder ultrasound performed today and this was negative, he will need a HIDA scan.    Electronically signed by Geo Shannon MD  08/23/23 15:33 EDT

## 2023-08-22 NOTE — PROGRESS NOTES
Patient: Gene Lawton    YOB: 1965    Date: 08/23/2023    Primary Care Provider: Bruno Hughes PA    Chief Complaint   Patient presents with    Heartburn    Hernia     Hiatal        SUBJECTIVE:    History of present illness:  I saw the patient in the office today as a consultation for evaluation and treatment of GERD.  Patient was seen in the emergency department at [facility] 08/14/2023 at which time he complained of epigastric area abdominal pain.  CT scan of the abdomen and pelvis was performed which showed constipation, a small hiatal hernia and mild periaortic adenopathy. He does have some regurgitation of acid at times. He has had reflux for the past 20+ years. His last EGD was over 21 years ago.    He was seen in the emergency room recently, he did have fairly severe epigastric abdominal discomfort.  He did have a colonoscopy performed in August of this year and benign polyps were removed.    The following portions of the patient's history were reviewed and updated as appropriate: allergies, current medications, past family history, past medical history, past social history, past surgical history and problem list.    Review of Systems   Constitutional:  Negative for chills, fever and unexpected weight change.   HENT:  Negative for trouble swallowing and voice change.    Eyes:  Negative for visual disturbance.   Respiratory:  Negative for apnea, cough, chest tightness, shortness of breath and wheezing.    Cardiovascular:  Negative for chest pain, palpitations and leg swelling.   Gastrointestinal:  Negative for abdominal distention, abdominal pain, anal bleeding, blood in stool, constipation, diarrhea, nausea, rectal pain and vomiting.   Endocrine: Negative for cold intolerance and heat intolerance.   Genitourinary:  Negative for difficulty urinating, dysuria, flank pain, scrotal swelling and testicular pain.   Musculoskeletal:  Negative for back pain, gait problem and joint  swelling.   Skin:  Negative for color change, rash and wound.   Neurological:  Negative for dizziness, syncope, speech difficulty, weakness, numbness and headaches.   Hematological:  Negative for adenopathy. Does not bruise/bleed easily.   Psychiatric/Behavioral:  Negative for confusion. The patient is not nervous/anxious.      History:  Past Medical History:   Diagnosis Date    Arthritis     Charcot Leigh Tooth muscular atrophy     Colon polyps     REPORTS HISTORY    COPD (chronic obstructive pulmonary disease)     Depression     Difficulty reading     Difficulty writing     High cholesterol     History of drug use     PATIENT REPORTS NO USE FOR OVER 15 YEARS    Hypertension     Medical non-compliance     Mobility impaired     REPORTS WALKS VERY LITTLE.  REPORTS CAN'T FEEL HANDS AND FEET DUR TO CHARCOT LEIGH TOOTH.  USES A WHEELCHAIR AND CAN TRANSFER FROM CHAIR TO CHAIR.      Muscular dystrophies     Stroke     PATIENT REPORTS QUESTIONABLE EPISODE 6-7 YEARS AGO AND THAT HE WAS FLOWN FROM [place] TO [place].    Wears glasses     Wears partial dentures     UPPER PLATE       Past Surgical History:   Procedure Laterality Date    COLONOSCOPY N/A 09/12/2017    Procedure: COLONOSCOPY WITH POLYPECTOMY;  Surgeon: Geo Shannon MD;  Location: [facility] ENDOSCOPY;  Service:     COLONOSCOPY N/A 8/9/2023    Procedure: COLONOSCOPY;  Surgeon: Geo Shannon MD;  Location: [facility] ENDOSCOPY;  Service: Gastroenterology;  Laterality: N/A;    COLONOSCOPY W/ POLYPECTOMY      ENDOSCOPY      FRACTURE SURGERY Right     REPORTS HARDWARE IN PLACE arm    FRACTURE SURGERY Bilateral     ANKLES, REPORTS HARDWARE IN PLACE IN BILATERAL ANKLES    HERNIA REPAIR      ventral with mesh    MOUTH SURGERY      EXTRACTIONS    TONSILLECTOMY         Family History   Problem Relation Age of Onset    Heart disease Father     Cancer Father        Social History     Tobacco Use    Smoking status: Some Days     Packs/day: 0.50     Years: 45.00     Pack years:  "22.50     Types: Cigarettes     Passive exposure: Current    Smokeless tobacco: Never   Vaping Use    Vaping Use: Never used   Substance Use Topics    Alcohol use: No     Comment: no abuse in over 21 years    Drug use: Not Currently     Types: Methamphetamines, Benzodiazepines     Comment: pt denies any recent use 8/8/23       Allergies:  No Known Allergies    Medications:    Current Outpatient Medications:     albuterol sulfate  (90 Base) MCG/ACT inhaler, Inhale 1 puff Every 4 (Four) Hours As Needed., Disp: , Rfl:     bisacodyl (Dulcolax) 5 MG EC tablet, Take 2 @ 3pm, 2 @ 7 pm day prior to colonoscopy, Disp: 4 tablet, Rfl: 0    gabapentin (NEURONTIN) 300 MG capsule, 1 capsule 3 (Three) Times a Day., Disp: , Rfl:     latanoprost (XALATAN) 0.005 % ophthalmic solution, Instill 1 drop in both eyes at bedtime, Disp: , Rfl:     oxyCODONE (ROXICODONE) 10 MG tablet, TAKE ONE TABLET FOUR TIMES DAILY (available fill 07/06/23), Disp: , Rfl:     pantoprazole (PROTONIX) 40 MG EC tablet, Take 1 tablet by mouth Daily., Disp: 30 tablet, Rfl: 0    tamsulosin (FLOMAX) 0.4 MG capsule 24 hr capsule, Take 1 capsule by mouth Daily., Disp: 30 capsule, Rfl: 0    OBJECTIVE:    Vital Signs:   Vitals:    08/23/23 1327   BP: 146/94   Pulse: (!) 137   SpO2: 97%   Weight: 83.5 kg (184 lb)   Height: 180.3 cm (71\")       Physical Exam:   General Appearance:    Alert, cooperative, in no acute distress   Head:    Normocephalic, without obvious abnormality, atraumatic   Eyes:            Lids and lashes normal, conjunctivae and sclerae normal, no   icterus, no pallor, corneas clear, PERRLA   Ears:    Ears appear intact with no abnormalities noted   Throat:   No oral lesions, no thrush, oral mucosa moist   Neck:   No adenopathy, supple, trachea midline, no thyromegaly, no   carotid bruit, no JVD   Lungs:     Clear to auscultation,respirations regular, even and                  unlabored    Heart:    Regular rhythm and normal rate, normal S1 and " S2, no            murmur, no gallop, no rub, no click   Chest Wall:    No abnormalities observed   Abdomen:     Normal bowel sounds, no masses, no organomegaly, soft        non-tender, non-distended, no guarding, there is evidence of epigastric  tenderness, no peritoneal signs   Extremities:   Moves all extremities well, no edema, no cyanosis, no             redness   Pulses:   Pulses palpable and equal bilaterally   Skin:   No bleeding, bruising or rash   Lymph nodes:   No palpable adenopathy   Neurologic:   Cranial nerves 2 - 12 grossly intact, sensation intact     Results Review:   I reviewed the patient's new clinical results.    Review of Systems was reviewed and confirmed as accurate as documented by the MA.    ASSESSMENT/PLAN:    1. Epigastric pain    2. Right upper quadrant abdominal pain        I did have a detailed and extensive discussion with the patient in the office and they understand that they need to undergo upper endoscopy. Full risks and benefits of operative versus nonoperative intervention were discussed with the patient and these include bleeding and esophageal injury. The patient understands, agrees, and wishes to proceed with the surgical treatment plan as mentioned above. The patient had no questions for me at the end of the discussion.       I discussed the patients findings and my recommendations with patient.     He did have a gallbladder ultrasound performed today and this was negative, he will need a HIDA scan.    Electronically signed by Geo Shannon MD  08/23/23 15:33 EDT

## 2023-08-23 ENCOUNTER — OFFICE VISIT (OUTPATIENT)
Dept: SURGERY | Facility: CLINIC | Age: 58
End: 2023-08-23
Payer: MEDICARE

## 2023-08-23 VITALS
HEART RATE: 137 BPM | HEIGHT: 71 IN | DIASTOLIC BLOOD PRESSURE: 94 MMHG | BODY MASS INDEX: 25.76 KG/M2 | OXYGEN SATURATION: 97 % | SYSTOLIC BLOOD PRESSURE: 146 MMHG | WEIGHT: 184 LBS

## 2023-08-23 DIAGNOSIS — R10.13 EPIGASTRIC PAIN: Primary | ICD-10-CM

## 2023-08-23 DIAGNOSIS — R10.11 RIGHT UPPER QUADRANT ABDOMINAL PAIN: ICD-10-CM

## 2023-08-23 PROCEDURE — 99213 OFFICE O/P EST LOW 20 MIN: CPT | Performed by: SURGERY

## 2023-08-23 PROCEDURE — 3077F SYST BP >= 140 MM HG: CPT | Performed by: SURGERY

## 2023-08-23 PROCEDURE — 3080F DIAST BP >= 90 MM HG: CPT | Performed by: SURGERY

## 2023-08-23 PROCEDURE — 1159F MED LIST DOCD IN RCRD: CPT | Performed by: SURGERY

## 2023-08-23 PROCEDURE — 1160F RVW MEDS BY RX/DR IN RCRD: CPT | Performed by: SURGERY

## 2023-08-24 PROBLEM — R10.13 EPIGASTRIC PAIN: Status: ACTIVE | Noted: 2023-08-24

## 2023-08-24 PROBLEM — R10.11 RIGHT UPPER QUADRANT ABDOMINAL PAIN: Status: ACTIVE | Noted: 2023-08-24

## 2023-08-26 ENCOUNTER — APPOINTMENT (OUTPATIENT)
Dept: GENERAL RADIOLOGY | Facility: HOSPITAL | Age: 58
End: 2023-08-26
Attending: EMERGENCY MEDICINE
Payer: MEDICARE

## 2023-08-26 ENCOUNTER — HOSPITAL ENCOUNTER (EMERGENCY)
Facility: HOSPITAL | Age: 58
Discharge: HOME | End: 2023-08-26
Attending: EMERGENCY MEDICINE
Payer: MEDICARE

## 2023-08-26 VITALS
HEART RATE: 84 BPM | RESPIRATION RATE: 16 BRPM | OXYGEN SATURATION: 97 % | BODY MASS INDEX: 25.76 KG/M2 | WEIGHT: 184 LBS | HEIGHT: 71 IN | DIASTOLIC BLOOD PRESSURE: 88 MMHG | TEMPERATURE: 98 F | SYSTOLIC BLOOD PRESSURE: 150 MMHG

## 2023-08-26 DIAGNOSIS — J06.9 ACUTE UPPER RESPIRATORY INFECTION: Primary | ICD-10-CM

## 2023-08-26 LAB
FLUAV AG NPH QL: NEGATIVE
FLUBV AG NPH QL: NEGATIVE
SARS-COV-2 RDRP RESP QL NAA+PROBE: NOT DETECTED

## 2023-08-26 PROCEDURE — 99284 EMERGENCY DEPT VISIT MOD MDM: CPT

## 2023-08-26 PROCEDURE — 71046 X-RAY EXAM CHEST 2 VIEWS: CPT

## 2023-08-26 PROCEDURE — 87804 INFLUENZA ASSAY W/OPTIC: CPT

## 2023-08-26 PROCEDURE — 87635 SARS-COV-2 COVID-19 AMP PRB: CPT

## 2023-08-26 RX ORDER — OXYCODONE HYDROCHLORIDE 10 MG/1
10 TABLET ORAL EVERY 6 HOURS PRN
COMMUNITY

## 2023-08-26 ASSESSMENT — PAIN - FUNCTIONAL ASSESSMENT: PAIN_FUNCTIONAL_ASSESSMENT: NONE - DENIES PAIN

## 2023-08-26 NOTE — ED NOTES
Dc instructions given to patient at this time, patient to follow up with pcp, no other questions or concerns.      Mesha Drake RN  08/26/23 [id number]

## 2023-08-29 ENCOUNTER — TELEPHONE (OUTPATIENT)
Dept: SURGERY | Facility: CLINIC | Age: 58
End: 2023-08-29
Payer: MEDICARE

## 2023-09-05 NOTE — PRE-PROCEDURE INSTRUCTIONS
PAT phone call completed for upcoming procedure with Dr. DWYER. PLEASE BRING A PICTURE ID AND INSURANCE CARD THE DAY OF PROCEDURE.  FOLLOW  ANY PRE-OP INSTRUCTIONS GIVEN BY YOUR DOCTOR.  Pt verbalized understanding.  PAT phone history completed with pt for upcoming procedure on      PAT PASS GIVEN/REVIEWED WITH PT.  VERBALIZED UNDERSTANDING OF THE FOLLOWING:  DO NOT EAT, DRINK, SMOKE, USE SMOKELESS TOBACCO OR CHEW GUM AFTER MIDNIGHT THE NIGHT BEFORE SURGERY.  THIS ALSO INCLUDES HARD CANDIES AND MINTS.    DO NOT SHAVE THE AREA TO BE OPERATED ON AT LEAST 48 HOURS PRIOR TO THE PROCEDURE.  DO NOT WEAR MAKE UP OR NAIL POLISH.  DO NOT LEAVE IN ANY PIERCING OR WEAR JEWELRY THE DAY OF SURGERY.      DO NOT USE ADHESIVES IF YOU WEAR DENTURES.    DO NOT WEAR EYE CONTACTS; BRING IN YOUR GLASSES.    ONLY TAKE MEDICATION THE MORNING OF YOUR PROCEDURE IF INSTRUCTED BY YOUR SURGEON WITH ENOUGH WATER TO SWALLOW THE MEDICATION.  IF YOUR SURGEON DID NOT SPECIFY WHICH MEDICATIONS TO TAKE, YOU WILL NEED TO CALL THEIR OFFICE FOR FURTHER INSTRUCTIONS AND DO AS THEY INSTRUCT.    LEAVE ANYTHING YOU CONSIDER VALUABLE AT HOME.    YOU WILL NEED TO ARRANGE FOR SOMEONE TO DRIVE YOU HOME AFTER SURGERY.  IT IS RECOMMENDED THAT YOU DO NOT DRIVE, WORK, DRINK ALCOHOL OR MAKE MAJOR DECISIONS FOR AT LEAST 24 HOURS AFTER YOUR PROCEDURE IS COMPLETE.      THE DAY OF YOUR PROCEDURE, BRING IN THE FOLLOWING IF APPLICABLE:   PICTURE ID AND INSURANCE/MEDICARE OR MEDICAID CARDS/ANY CO-PAY THAT MAY BE DUE   COPY OF ADVANCED DIRECTIVE/LIVING WILL/POWER OR    CPAP/BIPAP/INHALERS   SKIN PREP SHEET   YOUR PREADMISSION TESTING PASS (IF NOT A PHONE HISTORY)    Medication instructions given to pt by RN per anesthesia protocol.  Pt referred back to surgeon for further instructions if he/she is on any blood thinners.

## 2023-09-08 ENCOUNTER — TELEPHONE (OUTPATIENT)
Dept: SURGERY | Facility: CLINIC | Age: 58
End: 2023-09-08
Payer: MEDICARE

## 2023-09-13 ENCOUNTER — APPOINTMENT (OUTPATIENT)
Dept: NUCLEAR MEDICINE | Facility: HOSPITAL | Age: 58
End: 2023-09-13
Payer: MEDICARE

## 2023-09-13 ENCOUNTER — ANESTHESIA (OUTPATIENT)
Dept: GASTROENTEROLOGY | Facility: HOSPITAL | Age: 58
End: 2023-09-13
Payer: MEDICARE

## 2023-09-13 ENCOUNTER — HOSPITAL ENCOUNTER (OUTPATIENT)
Facility: HOSPITAL | Age: 58
Setting detail: HOSPITAL OUTPATIENT SURGERY
Discharge: HOME | End: 2023-09-13
Attending: SURGERY | Admitting: SURGERY
Payer: MEDICARE

## 2023-09-13 ENCOUNTER — ANESTHESIA EVENT (OUTPATIENT)
Dept: GASTROENTEROLOGY | Facility: HOSPITAL | Age: 58
End: 2023-09-13
Payer: MEDICARE

## 2023-09-13 VITALS
DIASTOLIC BLOOD PRESSURE: 91 MMHG | SYSTOLIC BLOOD PRESSURE: 124 MMHG | HEART RATE: 67 BPM | RESPIRATION RATE: 16 BRPM | OXYGEN SATURATION: 96 % | TEMPERATURE: 97.1 F

## 2023-09-13 DIAGNOSIS — R10.11 RIGHT UPPER QUADRANT ABDOMINAL PAIN: ICD-10-CM

## 2023-09-13 DIAGNOSIS — R10.13 EPIGASTRIC PAIN: ICD-10-CM

## 2023-09-13 PROCEDURE — 25010000002 PROPOFOL 10 MG/ML EMULSION: Performed by: NURSE ANESTHETIST, CERTIFIED REGISTERED

## 2023-09-13 RX ORDER — SODIUM CHLORIDE, SODIUM LACTATE, POTASSIUM CHLORIDE, CALCIUM CHLORIDE 600; 310; 30; 20 MG/100ML; MG/100ML; MG/100ML; MG/100ML
1000 INJECTION, SOLUTION INTRAVENOUS CONTINUOUS
Status: DISCONTINUED | OUTPATIENT
Start: 2023-09-13 | End: 2023-09-13 | Stop reason: HOSPADM

## 2023-09-13 RX ORDER — LIDOCAINE HYDROCHLORIDE 20 MG/ML
INJECTION, SOLUTION INTRAVENOUS AS NEEDED
Status: DISCONTINUED | OUTPATIENT
Start: 2023-09-13 | End: 2023-09-13 | Stop reason: SURG

## 2023-09-13 RX ORDER — PROPOFOL 10 MG/ML
VIAL (ML) INTRAVENOUS AS NEEDED
Status: DISCONTINUED | OUTPATIENT
Start: 2023-09-13 | End: 2023-09-13 | Stop reason: SURG

## 2023-09-13 RX ADMIN — SODIUM CHLORIDE, POTASSIUM CHLORIDE, SODIUM LACTATE AND CALCIUM CHLORIDE 1000 ML: 600; 310; 30; 20 INJECTION, SOLUTION INTRAVENOUS at 08:14

## 2023-09-13 RX ADMIN — LIDOCAINE HYDROCHLORIDE 40 MG: 20 INJECTION, SOLUTION INTRAVENOUS at 09:12

## 2023-09-13 RX ADMIN — PROPOFOL 200 MG: 10 INJECTION, EMULSION INTRAVENOUS at 09:12

## 2023-09-13 NOTE — DISCHARGE INSTRUCTIONS
Rest today  No pushing,pulling,tugging,heavy lifting, or strenuous activity   No major decision making,driving,or drinking alcoholic beverages for 24 hours due to the sedation you received  Always use good hand hygiene/washing technique  No driving on pain medication.No pushing, pulling, tugging,  heavy lifting, or strenuous activity.  No major decision making, driving, or drinking alcoholic beverages for 24 hours. ( due to the medications you have  received)  Always use good hand hygiene/washing techniques.  NO driving while taking pain medications.    * if you have an incision:  Check your incision area every day for signs of infection.   Check for:  * more redness, swelling, or pain  *more fluid or blood  *warmth  *pus or bad smellTo assist you in voiding:  Drink plenty of fluids  Listen to running water while attempting to void.    If you are unable to urinate and you have an uncomfortable urge to void or it has been   6 hours since you were discharged, return to the Emergency Room

## 2023-09-13 NOTE — ANESTHESIA PREPROCEDURE EVALUATION
Anesthesia Evaluation     Patient summary reviewed and Nursing notes reviewed   no history of anesthetic complications:   NPO Solid Status: > 8 hours  NPO Liquid Status: > 8 hours           Airway   Mallampati: II  TM distance: >3 FB  Neck ROM: full  No difficulty expected  Dental    (+) poor dentition    Pulmonary - normal exam   (+) a smoker (22.5 pack years) Current, COPD moderate,  Cardiovascular - normal exam  Exercise tolerance: poor (<4 METS)    ECG reviewed    (+) hypertension, CAD, angina, hyperlipidemia    ROS comment: EKG: Sinus tach with LAD     Neuro/Psych  (+) CVA (6-7 years ago), numbness, psychiatric history Depression  GI/Hepatic/Renal/Endo    (+) renal disease ARF    Musculoskeletal         ROS comment: Muscular dystrophies   Charcot Leigh Tooth muscular atrophy: pt relies on wheelchair for mobility   Abdominal    Substance History   (+) drug use (history of)     OB/GYN          Other   arthritis,     ROS/Med Hx Other: 15.3/44.4  K 3.9                  Anesthesia Plan    ASA 3     MAC     (Risks and benefits discussed including risk of aspiration, recall and dental damage. All patient questions answered. Will continue with POC.)  intravenous induction     Anesthetic plan, risks, benefits, and alternatives have been provided, discussed and informed consent has been obtained with: patient.    Plan discussed with CRNA.    CODE STATUS:

## 2023-09-13 NOTE — ANESTHESIA POSTPROCEDURE EVALUATION
Patient: Gene Lawton    Procedure Summary       Date: 09/13/23 Room / Location: [facility] ENDOSCOPY 3 / [facility] ENDOSCOPY    Anesthesia Start: 0850 Anesthesia Stop: 0918    Procedure: ESOPHAGOGASTRODUODENOSCOPY with biopsy (Esophagus) Diagnosis:       Epigastric pain      Right upper quadrant abdominal pain      (Epigastric pain [R10.13])      (Right upper quadrant abdominal pain [R10.11])    Surgeons: Geo Shannon MD Provider: Glenn Gracia CRNA    Anesthesia Type: MAC ASA Status: 3            Anesthesia Type: MAC    Vitals  HR 71  Sat 96  Rersp 20  Temp 97.6  BP 92/58        Post Anesthesia Care and Evaluation    Patient location during evaluation: bedside  Patient participation: complete - patient participated  Level of consciousness: awake and alert  Pain score: 0  Pain management: adequate    Airway patency: patent  Anesthetic complications: No anesthetic complications  PONV Status: none  Cardiovascular status: acceptable  Respiratory status: acceptable and nasal cannula  Hydration status: acceptable

## 2023-09-14 LAB — REF LAB TEST METHOD: NORMAL

## 2024-01-01 NOTE — TELEPHONE ENCOUNTER
Called and confirmed EGD, 09/13/23, Dr Shannon @ [facility]   NB H&P: HPI Single Birth
Date
H&P Date: 24
History of Birth
Delivery method:  section (repeat)
Delivery Date: 24
Delivery Time: 07:58
Inducation Comment: Maternal [place] prior to delivery date
Surfactant administered within 2 hours of birth: No
Birth length: 46.99 cm
Birth weight: 2.425 kg
Head circumference: 31.75 cm
Chest circumference: 31
Reason For Visit: 

Maternal Health Data
Maternal Health
: 4
Para: 3
Number of Living Children: 3
Prenatal care: good care
Amniotic membrane rupture date: 24
Amniotic membrane rupture time: 07:54
Blood type: O
Infant A
Amniotic membrance fluid description: Clear
Delivery method:  section (Repeat)
Labs
Hepatitis B results: Neg
Hepatitis C results: Neg
HIV results: Neg
Group B strep results: Unknown
Chlamydia results: Neg
Gonorrhea results: Neg
Rh Globulin: positive
Rubella results: Immune
Urine Drug Screen: Pending
Antibody screen: Negative
Received antibiotic prenatal: No
Recieved antibiotic during labor: Yes
Additional Details
OR abx dose only. RPR negative.

Apgar - Single Birth
1 Minute Interval
Heart rate: 100 bpm or Greater
Respiratory effort: Spontaneous/Strong Cry
Muscle tone: Active Movement
Reflex response: Prompt Response
Color: Bluish Hands or Feet
Apgar score: 9
5 Minute Interval
Heart rate: 100 bpm or Greater
Respiratory effort: Spontaneous/Strong Cry
Muscle tone: Active Movement
Reflex response: Prompt Response
Color: Bluish Hands or Feet
Apgar score: 9
Citation
Apgar V. A proposal for a new method of evaluation of the  infant. Curr.Res.Anesth.Analg. 1953;32(4): 260-267 

NB Exam
Narrative:   Exam Narrative: 

Vigorous
 
General Appearance:   General Appearance: alert, active, nondysmorphic and no acute distress
HEENT:   HEENT: atraumatic, eyes open, red reflex bilaterally, pink ears, nares patent, palate intact, anterior fontanelle flat/soft and good suck reflex
Neck:   Neck: full range of motion and supple
Respiratory:   Respiratory: clear to auscultation bilaterally and normal air movement
Cardiovasular:   Cardiovascular: regular rate, regular rhythm and femoral pulses present
Abdomen:   Abdomen: normal bowel sounds, soft and nondistended
Umbilicus:   Umbilicus: three vessels confirmed (clamped)
Genitourinary:   Genitourinary: normal genitalia (female) and anus patent
Extremities:   Extremities: five fingers each hand, five toes each foot, leg lengths symmetric, spine straight and Ortolani and Thibodeaux signs negative bilaterally
Skin:   Skin: warm, pink, brisk capillary refill and skin intact, soft/supple
Neurology:   Neurology: upgoing Babinski reflexes  Comments: 

Normal karen/grasp/suck/rooting reflexes
 

Assessment and Plan
Assessment and Plan
(1) Twin liveborn by : 
(2) Infant of 37 or more weeks gestation: 

Plan
Routine care and management initiated.
Formula feeding for twins planned.
Glucose screening/protocol based on 37 week gestational age/twin.
Car seat testing prior to discharge based on birthweight less than 2500g.
Screening tests prior to discharge: CCHD/Hearing/Bilirubin/State  screen.
Monitor feeding and weight.

## 2024-01-09 ENCOUNTER — TRANSCRIBE ORDERS (OUTPATIENT)
Dept: GENERAL RADIOLOGY | Facility: HOSPITAL | Age: 59
End: 2024-01-09

## 2024-01-09 DIAGNOSIS — F17.210 CIGARETTE SMOKER: Primary | ICD-10-CM

## 2024-01-23 ENCOUNTER — HOSPITAL ENCOUNTER (OUTPATIENT)
Dept: CT IMAGING | Facility: HOSPITAL | Age: 59
Discharge: HOME | End: 2024-01-23
Payer: MEDICARE

## 2024-01-23 VITALS — HEIGHT: 71 IN | BODY MASS INDEX: 25.76 KG/M2 | WEIGHT: 184 LBS

## 2024-01-23 DIAGNOSIS — F17.210 CIGARETTE SMOKER: ICD-10-CM

## 2024-01-23 PROCEDURE — 71271 CT THORAX LUNG CANCER SCR C-: CPT

## 2024-02-09 ENCOUNTER — APPOINTMENT (OUTPATIENT)
Dept: GENERAL RADIOLOGY | Facility: HOSPITAL | Age: 59
End: 2024-02-09
Payer: MEDICARE

## 2024-02-09 ENCOUNTER — HOSPITAL ENCOUNTER (EMERGENCY)
Facility: HOSPITAL | Age: 59
Discharge: HOME | End: 2024-02-09
Attending: EMERGENCY MEDICINE
Payer: MEDICARE

## 2024-02-09 ENCOUNTER — APPOINTMENT (OUTPATIENT)
Dept: CT IMAGING | Facility: HOSPITAL | Age: 59
End: 2024-02-09
Payer: MEDICARE

## 2024-02-09 VITALS
WEIGHT: 184 LBS | HEART RATE: 58 BPM | RESPIRATION RATE: 17 BRPM | TEMPERATURE: 98 F | BODY MASS INDEX: 25.76 KG/M2 | SYSTOLIC BLOOD PRESSURE: 138 MMHG | HEIGHT: 71 IN | OXYGEN SATURATION: 98 % | DIASTOLIC BLOOD PRESSURE: 92 MMHG

## 2024-02-09 DIAGNOSIS — R07.89 ATYPICAL CHEST PAIN: Primary | ICD-10-CM

## 2024-02-09 LAB
ALBUMIN SERPL-MCNC: 4 G/DL (ref 3.4–4.8)
ALBUMIN/GLOB SERPL: 1.3 {RATIO} (ref 0.8–2)
ALP SERPL-CCNC: 77 U/L (ref 25–100)
ALT SERPL-CCNC: 50 U/L (ref 4–36)
ANION GAP SERPL CALCULATED.3IONS-SCNC: 10 MMOL/L (ref 3–16)
AST SERPL-CCNC: 50 U/L (ref 8–33)
BASOPHILS # BLD: 0.1 K/UL (ref 0–0.1)
BASOPHILS NFR BLD: 0.9 %
BILIRUB SERPL-MCNC: 0.5 MG/DL (ref 0.3–1.2)
BUN SERPL-MCNC: 16 MG/DL (ref 6–20)
CALCIUM SERPL-MCNC: 8.9 MG/DL (ref 8.5–10.5)
CHLORIDE SERPL-SCNC: 103 MMOL/L (ref 98–107)
CO2 SERPL-SCNC: 22 MMOL/L (ref 20–30)
CREAT SERPL-MCNC: 0.7 MG/DL (ref 0.4–1.2)
D DIMER PPP DDU-MCNC: 0.79 UG/ML FEU (ref 0–0.6)
EOSINOPHIL # BLD: 0.5 K/UL (ref 0–0.4)
EOSINOPHIL NFR BLD: 6 %
ERYTHROCYTE [DISTWIDTH] IN BLOOD BY AUTOMATED COUNT: 13.2 % (ref 11–16)
GFR SERPLBLD CREATININE-BSD FMLA CKD-EPI: >60 ML/MIN/{1.73_M2}
GLOBULIN SER CALC-MCNC: 3.1 G/DL
GLUCOSE SERPL-MCNC: 133 MG/DL (ref 74–106)
HCT VFR BLD AUTO: 41.7 % (ref 40–54)
HGB BLD-MCNC: 14.3 G/DL (ref 13–18)
IMM GRANULOCYTES # BLD: 0 K/UL
IMM GRANULOCYTES NFR BLD: 0.1 % (ref 0–5)
LYMPHOCYTES # BLD: 3 K/UL (ref 1.5–4)
LYMPHOCYTES NFR BLD: 38.8 %
MAGNESIUM SERPL-MCNC: 2 MG/DL (ref 1.7–2.4)
MCH RBC QN AUTO: 31.6 PG (ref 27–32)
MCHC RBC AUTO-ENTMCNC: 34.3 G/DL (ref 31–35)
MCV RBC AUTO: 92.1 FL (ref 80–100)
MONOCYTES # BLD: 0.8 K/UL (ref 0.2–0.8)
MONOCYTES NFR BLD: 9.8 %
NEUTROPHILS # BLD: 3.4 K/UL (ref 2–7.5)
NEUTS SEG NFR BLD: 44.4 %
PLATELET # BLD AUTO: 284 K/UL (ref 150–400)
PMV BLD AUTO: 10.2 FL (ref 6–10)
POTASSIUM SERPL-SCNC: 3.8 MMOL/L (ref 3.4–5.1)
PROT SERPL-MCNC: 7.1 G/DL (ref 6.4–8.3)
RBC # BLD AUTO: 4.53 M/UL (ref 4.5–6)
SODIUM SERPL-SCNC: 135 MMOL/L (ref 136–145)
TROPONIN, HIGH SENSITIVITY: 8 NG/L (ref 0–22)
TROPONIN, HIGH SENSITIVITY: <6 NG/L (ref 0–22)
WBC # BLD AUTO: 7.7 K/UL (ref 4–11)

## 2024-02-09 PROCEDURE — 71260 CT THORAX DX C+: CPT

## 2024-02-09 PROCEDURE — 80053 COMPREHEN METABOLIC PANEL: CPT

## 2024-02-09 PROCEDURE — 99285 EMERGENCY DEPT VISIT HI MDM: CPT

## 2024-02-09 PROCEDURE — 71045 X-RAY EXAM CHEST 1 VIEW: CPT

## 2024-02-09 PROCEDURE — 84484 ASSAY OF TROPONIN QUANT: CPT

## 2024-02-09 PROCEDURE — 85025 COMPLETE CBC W/AUTO DIFF WBC: CPT

## 2024-02-09 PROCEDURE — 6360000002 HC RX W HCPCS: Performed by: EMERGENCY MEDICINE

## 2024-02-09 PROCEDURE — 36415 COLL VENOUS BLD VENIPUNCTURE: CPT

## 2024-02-09 PROCEDURE — 6370000000 HC RX 637 (ALT 250 FOR IP): Performed by: EMERGENCY MEDICINE

## 2024-02-09 PROCEDURE — 85379 FIBRIN DEGRADATION QUANT: CPT

## 2024-02-09 PROCEDURE — 83735 ASSAY OF MAGNESIUM: CPT

## 2024-02-09 PROCEDURE — 6360000004 HC RX CONTRAST MEDICATION: Performed by: EMERGENCY MEDICINE

## 2024-02-09 RX ORDER — NITROGLYCERIN 0.4 MG/1
0.4 TABLET SUBLINGUAL EVERY 5 MIN PRN
Status: DISCONTINUED | OUTPATIENT
Start: 2024-02-09 | End: 2024-02-09 | Stop reason: HOSPADM

## 2024-02-09 RX ORDER — KETOROLAC TROMETHAMINE 10 MG/1
10 TABLET, FILM COATED ORAL EVERY 6 HOURS PRN
Qty: 20 TABLET | Refills: 0 | Status: SHIPPED | OUTPATIENT
Start: 2024-02-09 | End: 2024-02-09

## 2024-02-09 RX ORDER — KETOROLAC TROMETHAMINE 15 MG/ML
15 INJECTION, SOLUTION INTRAMUSCULAR; INTRAVENOUS ONCE
Status: COMPLETED | OUTPATIENT
Start: 2024-02-09 | End: 2024-02-09

## 2024-02-09 RX ORDER — ASPIRIN 81 MG/1
324 TABLET, CHEWABLE ORAL ONCE
Status: COMPLETED | OUTPATIENT
Start: 2024-02-09 | End: 2024-02-09

## 2024-02-09 RX ORDER — KETOROLAC TROMETHAMINE 10 MG/1
10 TABLET, FILM COATED ORAL EVERY 6 HOURS PRN
Qty: 20 TABLET | Refills: 0 | Status: SHIPPED | OUTPATIENT
Start: 2024-02-09 | End: 2025-02-08

## 2024-02-09 RX ADMIN — Medication 0.4 MG: at 13:45

## 2024-02-09 RX ADMIN — KETOROLAC TROMETHAMINE 15 MG: 15 INJECTION, SOLUTION INTRAMUSCULAR; INTRAVENOUS at 14:25

## 2024-02-09 RX ADMIN — IOPAMIDOL 100 ML: 755 INJECTION, SOLUTION INTRAVENOUS at 14:56

## 2024-02-09 RX ADMIN — Medication 0.4 MG: at 13:52

## 2024-02-09 RX ADMIN — ASPIRIN 81 MG 324 MG: 81 TABLET ORAL at 13:45

## 2024-02-09 ASSESSMENT — PAIN DESCRIPTION - LOCATION
LOCATION: CHEST
LOCATION: CHEST

## 2024-02-09 ASSESSMENT — PAIN DESCRIPTION - FREQUENCY: FREQUENCY: CONTINUOUS

## 2024-02-09 ASSESSMENT — PAIN SCALES - GENERAL
PAINLEVEL_OUTOF10: 0
PAINLEVEL_OUTOF10: 8
PAINLEVEL_OUTOF10: 3

## 2024-02-09 ASSESSMENT — PAIN DESCRIPTION - ORIENTATION: ORIENTATION: LEFT

## 2024-02-09 ASSESSMENT — PAIN DESCRIPTION - DESCRIPTORS
DESCRIPTORS: PRESSURE
DESCRIPTORS: SORE

## 2024-02-09 ASSESSMENT — LIFESTYLE VARIABLES
HOW OFTEN DO YOU HAVE A DRINK CONTAINING ALCOHOL: NEVER
HOW MANY STANDARD DRINKS CONTAINING ALCOHOL DO YOU HAVE ON A TYPICAL DAY: PATIENT DOES NOT DRINK

## 2024-02-09 ASSESSMENT — PAIN - FUNCTIONAL ASSESSMENT: PAIN_FUNCTIONAL_ASSESSMENT: 0-10

## 2024-02-09 NOTE — ED TRIAGE NOTES
Patient presents to the ED with c/o unrelenting chest pain that started approximately 4 days ago. Patient reports the pain radiates through to his back and left shoulder blade. Patient also reports shortness of breath and nausea. Denies any alleviating or aggravating factors.

## 2024-02-09 NOTE — ED PROVIDER NOTES
.        [facility]  EMERGENCY DEPARTMENT ENCOUNTER        Pt Name: Thien Franklin  MRN: [id number]  Birthdate 1965  Date of evaluation: 2/9/2024  Provider: Abbie Rashid MD  PCP: Farrukh Jenkins PA  Note Started: 1:23 PM EST 2/9/24    CHIEF COMPLAINT       Chief Complaint   Patient presents with    Chest Pain     X4 days - radiating through to back    Shortness of Breath     X4 days         HISTORY OF PRESENT ILLNESS: 1 or more Elements     History from : Patient    Limitations to history : None    Thien Franklin is a 58 y.o. male who presents complaining of left anterior chest pain for 4 days which comes and goes has been getting worse however the last 12 hours or so patient is on chronic pain medication of Percocet and gabapentin this has not relieved his pain at all he states that the sharp type of pain which goes through to his back nothing is made it better it is worse on exertion it is also worse with taking a deep breath or sneezing.  He rates the pain a 10 out of 10 at worst and currently an 8 out of 10 he denies any diaphoresis he has been short of breath and nauseated but this.  He also does have a history of COPD and is still a tobacco smoker.    Nursing Notes were all reviewed and agreed with or any disagreements were addressed in the HPI.    REVIEW OF SYSTEMS :      Review of Systems     systems reviewed and negative except as in HPI/MDM    SURGICAL HISTORY     Past Surgical History:   Procedure Laterality Date    ANKLE SURGERY      bilateral    ARM SURGERY Bilateral     Crush injury    BRAIN SURGERY      skull injury, metal palte in head    COLONOSCOPY      patient states colon cancer    FRACTURE SURGERY      JOINT REPLACEMENT      left wrist    TONSILLECTOMY         CURRENTMEDICATIONS       Previous Medications    ALBUTEROL SULFATE HFA (PROVENTIL;VENTOLIN;PROAIR) 108 (90 BASE) MCG/ACT INHALER    Inhale 2 puffs into the lungs Every 4-6 hours as needed.  hernia, MI (myocardial infarction) (HCC), and Muscular dystrophies (HCC).     CC/HPI Summary, DDx, ED Course, and Reassessment: 1525    Patient has been stable states that the pain he is feeling feels better it is unclear whether it was  after the nitro or Toradol he did have a slightly elevated D-dimer so a PE protocol CT was done which is negative for pulmonary emboli a has slightly elevated liver functions ALT of 50 AST of 50 troponin is negative x 2 therefore not an acute coronary syndrome scenario it does appear that this is a pleuritic type pain where it gets worse with his cough and deep breath therefore I am going to go ahead and discharge him home with Toradol as needed and to follow-up with his primary care for further well workup if felt necessary    CONSULTS: (Who and What was discussed)  None            Chronic Conditions: Lakia christianson disease        Disposition Considerations (include 1 Tests not done, Shared Decision Making, Pt Expectation of Test or Tx.):         I am the Primary Clinician of Record.    FINAL IMPRESSION      1. Atypical chest pain          DISPOSITION/PLAN     DISPOSITION Decision To Discharge 02/09/2024 03:27:27 PM  Improved discharge to home    PATIENT REFERRED TO:  Farrukh Jenkins PA  [address] [id number]  [phone]    Schedule an appointment as soon as possible for a visit in 3 days        DISCHARGE MEDICATIONS:  New Prescriptions    KETOROLAC (TORADOL) 10 MG TABLET    Take 1 tablet by mouth every 6 hours as needed for Pain       DISCONTINUED MEDICATIONS:  Discontinued Medications    No medications on file              (Please note that portions of this note were completed with a voice recognition program.  Efforts were made to edit the dictations but occasionally words are mis-transcribed.)    Abbie Rashid MD (electronically signed)           Abbie Rashid MD  02/09/24 [id number]

## 2024-02-09 NOTE — ED NOTES
Patient reporting chest pain as unchanged after 2 doses of sublingual nitroglycerin - MD Rachid notified at this time, ordered to not give 3rd dose to patient at this time.

## 2025-01-09 ENCOUNTER — TRANSCRIBE ORDERS (OUTPATIENT)
Dept: ADMINISTRATIVE | Age: 60
End: 2025-01-09

## 2025-01-09 DIAGNOSIS — F17.210 CIGARETTE SMOKER: Primary | ICD-10-CM

## 2025-01-19 ENCOUNTER — HOSPITAL ENCOUNTER (EMERGENCY)
Facility: HOSPITAL | Age: 60
Discharge: HOME | End: 2025-01-19
Attending: HOSPITALIST
Payer: MEDICARE

## 2025-01-19 ENCOUNTER — APPOINTMENT (OUTPATIENT)
Dept: CT IMAGING | Facility: HOSPITAL | Age: 60
End: 2025-01-19
Attending: HOSPITALIST
Payer: MEDICARE

## 2025-01-19 VITALS
WEIGHT: 195 LBS | SYSTOLIC BLOOD PRESSURE: 129 MMHG | DIASTOLIC BLOOD PRESSURE: 60 MMHG | BODY MASS INDEX: 27.3 KG/M2 | HEART RATE: 80 BPM | TEMPERATURE: 98.2 F | HEIGHT: 71 IN | RESPIRATION RATE: 16 BRPM | OXYGEN SATURATION: 95 %

## 2025-01-19 DIAGNOSIS — N20.1 URETEROLITHIASIS: Primary | ICD-10-CM

## 2025-01-19 LAB
ALBUMIN SERPL-MCNC: 3.8 G/DL (ref 3.4–4.8)
ALBUMIN/GLOB SERPL: 1.2 {RATIO} (ref 0.8–2)
ALP SERPL-CCNC: 102 U/L (ref 25–100)
ALT SERPL-CCNC: 39 U/L (ref 4–36)
ANION GAP SERPL CALCULATED.3IONS-SCNC: 11 MMOL/L (ref 3–16)
AST SERPL-CCNC: 51 U/L (ref 8–33)
BASOPHILS # BLD: 0.1 K/UL (ref 0–0.1)
BASOPHILS NFR BLD: 0.6 %
BILIRUB SERPL-MCNC: 0.4 MG/DL (ref 0.3–1.2)
BILIRUB UR QL STRIP.AUTO: NEGATIVE
BUN SERPL-MCNC: 9 MG/DL (ref 6–20)
CALCIUM SERPL-MCNC: 9.2 MG/DL (ref 8.5–10.5)
CHLORIDE SERPL-SCNC: 104 MMOL/L (ref 98–107)
CLARITY UR: CLEAR
CO2 SERPL-SCNC: 24 MMOL/L (ref 20–30)
COLOR UR: YELLOW
CREAT SERPL-MCNC: 1 MG/DL (ref 0.4–1.2)
EOSINOPHIL # BLD: 0.3 K/UL (ref 0–0.4)
EOSINOPHIL NFR BLD: 2.8 %
ERYTHROCYTE [DISTWIDTH] IN BLOOD BY AUTOMATED COUNT: 13.9 % (ref 11–16)
GFR SERPLBLD CREATININE-BSD FMLA CKD-EPI: 85 ML/MIN/{1.73_M2}
GLOBULIN SER CALC-MCNC: 3.3 G/DL
GLUCOSE SERPL-MCNC: 106 MG/DL (ref 74–106)
GLUCOSE UR STRIP.AUTO-MCNC: NEGATIVE MG/DL
HCT VFR BLD AUTO: 44.5 % (ref 40–54)
HGB BLD-MCNC: 14.8 G/DL (ref 13–18)
HGB UR QL STRIP.AUTO: NEGATIVE
IMM GRANULOCYTES # BLD: 0 K/UL
IMM GRANULOCYTES NFR BLD: 0.3 % (ref 0–5)
KETONES UR STRIP.AUTO-MCNC: ABNORMAL MG/DL
LEUKOCYTE ESTERASE UR QL STRIP.AUTO: NEGATIVE
LIPASE SERPL-CCNC: 30 U/L (ref 5.6–51.3)
LYMPHOCYTES # BLD: 2.6 K/UL (ref 1.5–4)
LYMPHOCYTES NFR BLD: 24.3 %
MCH RBC QN AUTO: 31 PG (ref 27–32)
MCHC RBC AUTO-ENTMCNC: 33.3 G/DL (ref 31–35)
MCV RBC AUTO: 93.1 FL (ref 80–100)
MONOCYTES # BLD: 1.3 K/UL (ref 0.2–0.8)
MONOCYTES NFR BLD: 11.9 %
NEUTROPHILS # BLD: 6.5 K/UL (ref 2–7.5)
NEUTS SEG NFR BLD: 60.1 %
NITRITE UR QL STRIP.AUTO: NEGATIVE
PH UR STRIP.AUTO: 5.5 [PH] (ref 5–8)
PLATELET # BLD AUTO: 210 K/UL (ref 150–400)
PMV BLD AUTO: 10.5 FL (ref 6–10)
POTASSIUM SERPL-SCNC: 4 MMOL/L (ref 3.4–5.1)
PROT SERPL-MCNC: 7.1 G/DL (ref 6.4–8.3)
PROT UR STRIP.AUTO-MCNC: NEGATIVE MG/DL
RBC # BLD AUTO: 4.78 M/UL (ref 4.5–6)
SODIUM SERPL-SCNC: 139 MMOL/L (ref 136–145)
SP GR UR STRIP.AUTO: >=1.03 (ref 1–1.03)
UA COMPLETE W REFLEX CULTURE PNL UR: ABNORMAL
UA DIPSTICK W REFLEX MICRO PNL UR: ABNORMAL
URN SPEC COLLECT METH UR: ABNORMAL
UROBILINOGEN UR STRIP-ACNC: 0.2 E.U./DL
WBC # BLD AUTO: 10.8 K/UL (ref 4–11)

## 2025-01-19 PROCEDURE — 96375 TX/PRO/DX INJ NEW DRUG ADDON: CPT

## 2025-01-19 PROCEDURE — 99284 EMERGENCY DEPT VISIT MOD MDM: CPT

## 2025-01-19 PROCEDURE — 83690 ASSAY OF LIPASE: CPT

## 2025-01-19 PROCEDURE — 80053 COMPREHEN METABOLIC PANEL: CPT

## 2025-01-19 PROCEDURE — 6360000002 HC RX W HCPCS: Performed by: HOSPITALIST

## 2025-01-19 PROCEDURE — 74176 CT ABD & PELVIS W/O CONTRAST: CPT

## 2025-01-19 PROCEDURE — 85025 COMPLETE CBC W/AUTO DIFF WBC: CPT

## 2025-01-19 PROCEDURE — 96374 THER/PROPH/DIAG INJ IV PUSH: CPT

## 2025-01-19 PROCEDURE — 2580000003 HC RX 258: Performed by: HOSPITALIST

## 2025-01-19 PROCEDURE — 51798 US URINE CAPACITY MEASURE: CPT

## 2025-01-19 PROCEDURE — 81003 URINALYSIS AUTO W/O SCOPE: CPT

## 2025-01-19 PROCEDURE — 36415 COLL VENOUS BLD VENIPUNCTURE: CPT

## 2025-01-19 RX ORDER — 0.9 % SODIUM CHLORIDE 0.9 %
1000 INTRAVENOUS SOLUTION INTRAVENOUS ONCE
Status: COMPLETED | OUTPATIENT
Start: 2025-01-19 | End: 2025-01-19

## 2025-01-19 RX ORDER — KETOROLAC TROMETHAMINE 10 MG/1
10 TABLET, FILM COATED ORAL EVERY 6 HOURS PRN
Qty: 20 TABLET | Refills: 0 | Status: SHIPPED | OUTPATIENT
Start: 2025-01-19 | End: 2026-01-19

## 2025-01-19 RX ORDER — ONDANSETRON 2 MG/ML
4 INJECTION INTRAMUSCULAR; INTRAVENOUS ONCE
Status: COMPLETED | OUTPATIENT
Start: 2025-01-19 | End: 2025-01-19

## 2025-01-19 RX ORDER — MORPHINE SULFATE 4 MG/ML
4 INJECTION, SOLUTION INTRAMUSCULAR; INTRAVENOUS EVERY 30 MIN PRN
Status: DISCONTINUED | OUTPATIENT
Start: 2025-01-19 | End: 2025-01-19 | Stop reason: HOSPADM

## 2025-01-19 RX ORDER — TAMSULOSIN HYDROCHLORIDE 0.4 MG/1
0.4 CAPSULE ORAL DAILY
Qty: 10 CAPSULE | Refills: 0 | Status: SHIPPED | OUTPATIENT
Start: 2025-01-19 | End: 2025-01-29

## 2025-01-19 RX ORDER — KETOROLAC TROMETHAMINE 30 MG/ML
30 INJECTION, SOLUTION INTRAMUSCULAR; INTRAVENOUS ONCE
Status: COMPLETED | OUTPATIENT
Start: 2025-01-19 | End: 2025-01-19

## 2025-01-19 RX ORDER — ONDANSETRON 4 MG/1
4 TABLET, ORALLY DISINTEGRATING ORAL EVERY 4 HOURS PRN
Qty: 21 TABLET | Refills: 0 | Status: SHIPPED | OUTPATIENT
Start: 2025-01-19

## 2025-01-19 RX ADMIN — ONDANSETRON 4 MG: 2 INJECTION INTRAMUSCULAR; INTRAVENOUS at 16:15

## 2025-01-19 RX ADMIN — MORPHINE SULFATE 4 MG: 4 INJECTION, SOLUTION INTRAMUSCULAR; INTRAVENOUS at 16:21

## 2025-01-19 RX ADMIN — SODIUM CHLORIDE 1000 ML: 9 INJECTION, SOLUTION INTRAVENOUS at 16:13

## 2025-01-19 RX ADMIN — KETOROLAC TROMETHAMINE 30 MG: 30 INJECTION, SOLUTION INTRAMUSCULAR; INTRAVENOUS at 16:15

## 2025-01-19 ASSESSMENT — PAIN SCALES - GENERAL
PAINLEVEL_OUTOF10: 2
PAINLEVEL_OUTOF10: 10

## 2025-01-19 ASSESSMENT — PAIN DESCRIPTION - ORIENTATION: ORIENTATION: LOWER;LEFT

## 2025-01-19 ASSESSMENT — LIFESTYLE VARIABLES: HOW OFTEN DO YOU HAVE A DRINK CONTAINING ALCOHOL: NEVER

## 2025-01-19 ASSESSMENT — PAIN DESCRIPTION - LOCATION: LOCATION: ABDOMEN

## 2025-01-19 ASSESSMENT — PAIN - FUNCTIONAL ASSESSMENT: PAIN_FUNCTIONAL_ASSESSMENT: 0-10

## 2025-01-19 ASSESSMENT — PAIN DESCRIPTION - DESCRIPTORS: DESCRIPTORS: SHARP

## 2025-01-19 NOTE — ED PROVIDER NOTES
[facility]  EMERGENCY DEPARTMENT ENCOUNTER        Pt Name: Thien Franklin  MRN: [id number]  Birthdate 1965  Date of evaluation: 1/19/2025  Provider: Russell Concepcion DO  PCP: Farrukh Jenkins PA  Note Started: 3:55 PM EST 1/19/25    CHIEF COMPLAINT       Chief Complaint   Patient presents with    Abdominal Pain    Hematuria    Nausea    Groin Pain       HISTORY OF PRESENT ILLNESS: 1 or more Elements     History from : Patient    Limitations to history : None    Thien Franklin is a 59 y.o. male who presents to the emergency department for left flank, left abdominal, bladder and left testicular discomfort.  Patient states that a couple of days ago he noticed that when he went to the bathroom he noticed some blood in his urine.  He states since then he has had decreased urinary output.  Patient states he feels like he needs to go to bathroom but he just cannot.  Patient is now having left flank and abdominal discomfort.  He states the pain is radiating down into his left testicular area.  He denies any headache with the symptoms.  Denies any earache.  Denies any sore throat.  Denies cough.  Denies chest pain or shortness of breath.  Denies nausea vomiting or diarrhea.  Positive for abdominal discomfort/flank pain.  Denies any pain or discomfort with urination but states he just has decreased urinary output and states he did see blood in his urine previously.  Denies body aches out of the ordinary.  Denies any sensation of fevers and chills.  Denies any sick contacts that he is aware of.  Patient states the pain is sharp and stabbing he rates it a 10 out of 10.  There is nothing that he is done is made any better or any worse.  He does take chronic pain medication at home Oxy IR 10 he gets 120 a month.  He is also on gabapentin.  Past medical history is pertinent for CAD, cancer, Charcot-Lakia-Tooth disease, congestive heart failure, colon cancer, COPD, hiatal

## 2025-01-19 NOTE — ED NOTES
Reviewed discharge plan with Thien Franklin.  Encouraged him to f/u with Farrukh Jenkins PA and he understood.  NAD noted on discharge, gait steady.  Reviewed discharge prescription for:     Current Discharge Medication List        START taking these medications    Details   ketorolac (TORADOL) 10 MG tablet Take 1 tablet by mouth every 6 hours as needed for Pain  Qty: 20 tablet, Refills: 0      ondansetron (ZOFRAN-ODT) 4 MG disintegrating tablet Take 1 tablet by mouth every 4 hours as needed for Nausea or Vomiting  Qty: 21 tablet, Refills: 0      tamsulosin (FLOMAX) 0.4 MG capsule Take 1 capsule by mouth daily for 10 doses  Qty: 10 capsule, Refills: 0             Thien states understanding of how and when to take medications.      Electronically signed by Brigette Calderon RN on 1/19/2025 at 5:41 PM

## 2025-01-19 NOTE — ED TRIAGE NOTES
Pt arrives with c/o bilateral lower abd pain, worse on the left side for a couple of days.  He c/o left side groin pain nausea and hematuria

## 2025-01-27 ENCOUNTER — HOSPITAL ENCOUNTER (OUTPATIENT)
Dept: CT IMAGING | Facility: HOSPITAL | Age: 60
Discharge: HOME | End: 2025-01-27
Payer: MEDICARE

## 2025-01-27 DIAGNOSIS — F17.210 CIGARETTE SMOKER: ICD-10-CM

## 2025-01-27 PROCEDURE — 71271 CT THORAX LUNG CANCER SCR C-: CPT

## 2025-02-05 ENCOUNTER — TELEPHONE (OUTPATIENT)
Dept: CASE MANAGEMENT | Facility: HOSPITAL | Age: 60
End: 2025-02-05

## 2025-02-05 NOTE — TELEPHONE ENCOUNTER
Spoke to patient's PCP office, [facility], to confirm receipt of results. Office states they did receive results and have spoken to the patient.

## 2025-03-21 ENCOUNTER — APPOINTMENT (OUTPATIENT)
Dept: CT IMAGING | Facility: HOSPITAL | Age: 60
End: 2025-03-21
Payer: MEDICARE

## 2025-03-21 ENCOUNTER — APPOINTMENT (OUTPATIENT)
Dept: GENERAL RADIOLOGY | Facility: HOSPITAL | Age: 60
End: 2025-03-21
Payer: MEDICARE

## 2025-03-21 ENCOUNTER — HOSPITAL ENCOUNTER (EMERGENCY)
Facility: HOSPITAL | Age: 60
Discharge: HOME | End: 2025-03-21
Attending: EMERGENCY MEDICINE
Payer: MEDICARE

## 2025-03-21 VITALS
HEART RATE: 103 BPM | TEMPERATURE: 98.3 F | OXYGEN SATURATION: 97 % | BODY MASS INDEX: 27.16 KG/M2 | RESPIRATION RATE: 18 BRPM | WEIGHT: 194 LBS | DIASTOLIC BLOOD PRESSURE: 87 MMHG | HEIGHT: 71 IN | SYSTOLIC BLOOD PRESSURE: 122 MMHG

## 2025-03-21 DIAGNOSIS — J98.4 PULMONARY CAVITARY LESION: Primary | ICD-10-CM

## 2025-03-21 DIAGNOSIS — J40 BRONCHITIS: ICD-10-CM

## 2025-03-21 LAB
ALBUMIN SERPL-MCNC: 4 G/DL (ref 3.4–4.8)
ALBUMIN/GLOB SERPL: 1.2 {RATIO} (ref 0.8–2)
ALP SERPL-CCNC: 98 U/L (ref 25–100)
ALT SERPL-CCNC: 45 U/L (ref 4–36)
ANION GAP SERPL CALCULATED.3IONS-SCNC: 12 MMOL/L (ref 3–16)
AST SERPL-CCNC: 53 U/L (ref 8–33)
BASOPHILS # BLD: 0.1 K/UL (ref 0–0.1)
BASOPHILS NFR BLD: 0.5 %
BILIRUB SERPL-MCNC: 0.3 MG/DL (ref 0.3–1.2)
BUN SERPL-MCNC: 23 MG/DL (ref 6–20)
CALCIUM SERPL-MCNC: 9.3 MG/DL (ref 8.3–10.6)
CHLORIDE SERPL-SCNC: 105 MMOL/L (ref 98–107)
CO2 SERPL-SCNC: 24 MMOL/L (ref 20–30)
CREAT SERPL-MCNC: 1 MG/DL (ref 0.4–1.2)
EOSINOPHIL # BLD: 0.2 K/UL (ref 0–0.4)
EOSINOPHIL NFR BLD: 1.8 %
ERYTHROCYTE [DISTWIDTH] IN BLOOD BY AUTOMATED COUNT: 13 % (ref 11–16)
FLUAV AG NPH QL: NEGATIVE
FLUBV AG NPH QL: NEGATIVE
GFR SERPLBLD CREATININE-BSD FMLA CKD-EPI: 85 ML/MIN/{1.73_M2}
GLOBULIN SER CALC-MCNC: 3.4 G/DL
GLUCOSE SERPL-MCNC: 107 MG/DL (ref 74–106)
HCT VFR BLD AUTO: 43.8 % (ref 40–54)
HGB BLD-MCNC: 14.4 G/DL (ref 13–18)
IMM GRANULOCYTES # BLD: 0.1 K/UL
IMM GRANULOCYTES NFR BLD: 0.5 % (ref 0–5)
LIPASE SERPL-CCNC: 53 U/L (ref 5.6–51.3)
LYMPHOCYTES # BLD: 2.9 K/UL (ref 1.5–4)
LYMPHOCYTES NFR BLD: 22.1 %
MCH RBC QN AUTO: 31 PG (ref 27–32)
MCHC RBC AUTO-ENTMCNC: 32.9 G/DL (ref 31–35)
MCV RBC AUTO: 94.4 FL (ref 80–100)
MONOCYTES # BLD: 1.2 K/UL (ref 0.2–0.8)
MONOCYTES NFR BLD: 9.4 %
NEUTROPHILS # BLD: 8.6 K/UL (ref 2–7.5)
NEUTS SEG NFR BLD: 65.7 %
PLATELET # BLD AUTO: 282 K/UL (ref 150–400)
PMV BLD AUTO: 10.4 FL (ref 6–10)
POTASSIUM SERPL-SCNC: 4.4 MMOL/L (ref 3.4–5.1)
PROT SERPL-MCNC: 7.4 G/DL (ref 6.4–8.3)
RBC # BLD AUTO: 4.64 M/UL (ref 4.5–6)
SARS-COV-2 RDRP RESP QL NAA+PROBE: NOT DETECTED
SODIUM SERPL-SCNC: 141 MMOL/L (ref 136–145)
WBC # BLD AUTO: 13 K/UL (ref 4–11)

## 2025-03-21 PROCEDURE — 85025 COMPLETE CBC W/AUTO DIFF WBC: CPT

## 2025-03-21 PROCEDURE — 36415 COLL VENOUS BLD VENIPUNCTURE: CPT

## 2025-03-21 PROCEDURE — 87635 SARS-COV-2 COVID-19 AMP PRB: CPT

## 2025-03-21 PROCEDURE — 83690 ASSAY OF LIPASE: CPT

## 2025-03-21 PROCEDURE — 71250 CT THORAX DX C-: CPT

## 2025-03-21 PROCEDURE — 6370000000 HC RX 637 (ALT 250 FOR IP): Performed by: EMERGENCY MEDICINE

## 2025-03-21 PROCEDURE — 74176 CT ABD & PELVIS W/O CONTRAST: CPT

## 2025-03-21 PROCEDURE — 87804 INFLUENZA ASSAY W/OPTIC: CPT

## 2025-03-21 PROCEDURE — 6360000002 HC RX W HCPCS: Performed by: EMERGENCY MEDICINE

## 2025-03-21 PROCEDURE — 99285 EMERGENCY DEPT VISIT HI MDM: CPT

## 2025-03-21 PROCEDURE — 96374 THER/PROPH/DIAG INJ IV PUSH: CPT

## 2025-03-21 PROCEDURE — 71045 X-RAY EXAM CHEST 1 VIEW: CPT

## 2025-03-21 PROCEDURE — 80053 COMPREHEN METABOLIC PANEL: CPT

## 2025-03-21 RX ORDER — GUAIFENESIN/DEXTROMETHORPHAN 100-10MG/5
15 SYRUP ORAL EVERY 4 HOURS PRN
Status: DISCONTINUED | OUTPATIENT
Start: 2025-03-21 | End: 2025-03-21 | Stop reason: HOSPADM

## 2025-03-21 RX ORDER — DOXYCYCLINE HYCLATE 100 MG
100 TABLET ORAL 2 TIMES DAILY
Qty: 20 TABLET | Refills: 0 | Status: SHIPPED | OUTPATIENT
Start: 2025-03-21 | End: 2025-03-31

## 2025-03-21 RX ORDER — GUAIFENESIN/DEXTROMETHORPHAN 100-10MG/5
5 SYRUP ORAL 4 TIMES DAILY PRN
Qty: 120 ML | Refills: 0 | Status: SHIPPED | OUTPATIENT
Start: 2025-03-21 | End: 2025-03-31

## 2025-03-21 RX ORDER — ONDANSETRON 2 MG/ML
4 INJECTION INTRAMUSCULAR; INTRAVENOUS ONCE
Status: COMPLETED | OUTPATIENT
Start: 2025-03-21 | End: 2025-03-21

## 2025-03-21 RX ADMIN — GUAIFENESIN AND DEXTROMETHORPHAN 15 ML: 100; 10 SYRUP ORAL at 03:35

## 2025-03-21 RX ADMIN — ONDANSETRON 4 MG: 2 INJECTION, SOLUTION INTRAMUSCULAR; INTRAVENOUS at 03:34

## 2025-03-21 ASSESSMENT — PAIN - FUNCTIONAL ASSESSMENT: PAIN_FUNCTIONAL_ASSESSMENT: 0-10

## 2025-03-21 ASSESSMENT — PAIN DESCRIPTION - LOCATION: LOCATION: ABDOMEN

## 2025-03-21 ASSESSMENT — PAIN DESCRIPTION - FREQUENCY: FREQUENCY: CONTINUOUS

## 2025-03-21 ASSESSMENT — PAIN DESCRIPTION - PAIN TYPE: TYPE: ACUTE PAIN

## 2025-03-21 ASSESSMENT — PAIN DESCRIPTION - DESCRIPTORS: DESCRIPTORS: SHARP

## 2025-03-21 ASSESSMENT — PAIN SCALES - GENERAL: PAINLEVEL_OUTOF10: 10

## 2025-03-21 NOTE — ED TRIAGE NOTES
Patient c/o abdominal pain with coughing that is associated with nausea. States pain started after dinner. He reports pain similar about a week ago.

## 2025-03-21 NOTE — DISCHARGE INSTRUCTIONS
Please follow-up with your PCP, [facility] today in order to discuss your CT scan findings.      Take the medications prescribed as directed.    If any new /acute symptoms or worsening of current presentation please go to the closest urgent care or emergency room for prompt reevaluation.

## 2025-03-21 NOTE — ED NOTES
Patient observed taking medication out of home medication bottle. Inquired about what medication patient is taking. Patient states he is taking a \"blood pressure medication\". Patient son put medication bottle back in a paper bag and rolled the bag closed, not allowing RN to see medication bottle. Patient instructed to hold home medication and advise nursing and/or provider for needed daily home medications. Patient verbalized understanding. Denied further needs or concerns.

## 2025-03-21 NOTE — ED NOTES
Pt sleeping, wakened suddenly and called out wanting to know where he is. Pt reoriented, pt thenwoke and spilled water all over himself

## 2025-03-21 NOTE — ED NOTES
Pt resting on bed, pt sleepy r/t took regular home meds, oxycontin, pta. Pt upset about not understanding the test and procedures that are being done.pt has had 2 coughing spells so far during ed visit. Pt ciara po meds and zofran. Dilaudid not given r/t pt dozing off when not engaged in conversation. Pt also concerned about how he is to get home.

## 2025-03-21 NOTE — ED NOTES
Pt is to be dc'd and states understanding of meds and dc inst. Iv dc'd and bleeding controlled. Pt and son have no way home and advised they can stay in room for a ride

## 2025-03-21 NOTE — ED NOTES
Pt refusing the contrast after getting into the ct room. During the ct pt fell asleep and then woke very frightened and yelling r/t not knowing where he was. Pt returned to ed room and dr at bedside talking with pt.

## 2025-03-22 ASSESSMENT — ENCOUNTER SYMPTOMS
COUGH: 0
DIARRHEA: 0
SINUS PRESSURE: 0
WHEEZING: 0
EYE PAIN: 0
RHINORRHEA: 0
EYE REDNESS: 0
TROUBLE SWALLOWING: 0
CHOKING: 0
VOMITING: 0
NAUSEA: 1
EYE ITCHING: 0
SORE THROAT: 0
ABDOMINAL PAIN: 1
CHEST TIGHTNESS: 0
SHORTNESS OF BREATH: 0

## 2025-03-22 NOTE — ED PROVIDER NOTES
[facility]  EMERGENCY DEPARTMENT ENCOUNTER        Pt Name: Thien Franklin  MRN: [id number]  Birthdate 1965  Date of evaluation: 3/21/2025  Provider: Robert Alvarado MD  PCP: Farrukh Jenkins PA  Note Started: 5:58 AM EDT 3/22/25    CHIEF COMPLAINT       Chief Complaint   Patient presents with    Abdominal Pain     Abdominal pain with coughing. Associated with nausea.       HISTORY OF PRESENT ILLNESS: 1 or more Elements     History from : Patient    Limitations to history : None    Thien Franklin is a 60 y.o. male who presents to the emergency room with sharp epigastric, periumbilical ankle and suprapubic pain after eating dinner tonight, followed by multiple episodes of burping.  Patient had a similar episode 1 week ago.  Denies any recent travel, denies recent exposure to sick contacts.  No vomiting, no diarrhea.      Nursing Notes were all reviewed and agreed with or any disagreements were addressed in the HPI.    REVIEW OF SYSTEMS :      Review of Systems   Constitutional:  Negative for chills, diaphoresis, fatigue and fever.   HENT:  Negative for drooling, ear discharge, ear pain, postnasal drip, rhinorrhea, sinus pressure, sneezing, sore throat, tinnitus and trouble swallowing.    Eyes:  Negative for pain, redness and itching.   Respiratory:  Negative for cough, choking, chest tightness, shortness of breath and wheezing.    Cardiovascular:  Negative for chest pain.   Gastrointestinal:  Positive for abdominal pain and nausea. Negative for diarrhea and vomiting.   Endocrine: Negative for polydipsia and polyphagia.   Genitourinary:  Negative for dysuria, frequency, genital sores, hematuria and urgency.   Musculoskeletal:  Negative for gait problem.   Skin:  Negative for rash.   Neurological:  Negative for seizures, syncope and headaches.   Hematological:  Negative for adenopathy.   Psychiatric/Behavioral:  Negative for confusion, decreased  attenuation opacity in the right lower lobe, series 5).. No pericardial effusion. No significant lymphadenopathy. No destructive osseous lesion in the chest. 2 mm nonobstructing calculus left kidney. No hydronephrosis. Adrenal glands spleen pancreas and liver gallbladder unremarkable. Atherosclerotic calcifications in the aorta. No acute GI tract abnormality. No free air or free fluid. No focal fluid collection. No abdominal or pelvic lymphadenopathy. The appendix is normal. Prior ventral wall hernia repair in the pelvis. No destructive osseous lesion in the abdomen or pelvis.     1. Cavitary, mixed attenuation opacity in the right upper lobe measuring 2.9 x 1.8 cm. Differential diagnosis includes a lung cancer versus infectious pneumatocele. 3 month CT follow-up is recommended at the very least. Recommend pulmonary consultation. Histologic sampling should be considered. 2. Additional 1 cm mixed attenuation opacity in the right lower lobe. Differential diagnosis for this lesion remains the same. 3. 2 mm nonobstructing calculus left kidney. 4. Otherwise no acute abdominopelvic abnormality. Electronically signed by Adan Duckworth,     CT CHEST WO CONTRAST  Result Date: 3/21/2025  CT chest without contrast, CT abdomen pelvis without contrast HISTORY: Chest pain. Abdominal pain. COMPARISON: CT from January 2025. Individualized dose optimization technique was used in order to meet ALARA standards for radiation dose reduction.  In addition to vendor specific dose reduction algorithms, the dose reduction techniques vary based on the specific scanner utilized but frequently include automated exposure control, adjustment of the mA and/or kV according to patient size, and use of iterative reconstruction technique. FINDINGS: Central airways are patent. The study is limited due to patient respiratory motion. A focal, cavitary mixed attenuation opacity is present in the right upper lobe measuring 2.9 x 1.8 cm. There is

## 2025-04-02 ENCOUNTER — TELEPHONE (OUTPATIENT)
Dept: CASE MANAGEMENT | Facility: HOSPITAL | Age: 60
End: 2025-04-02

## 2025-04-02 NOTE — TELEPHONE ENCOUNTER
Patient had CT Lung Screening on 1/27/25 with suspicious result classified as lung RADS 4A. Spoke to PCP office on 2/5/25 to confirm receipt of results. Per chart notes, patient came to the ED on 3/21/25, during this encounter further imaging of the chest was obtained, per radiologist notes pulmonary consultation and histologic sampling should be considered.    I spoke to Brissa with [facility] on 4/2/25 to confirm that patient PCP is aware of imaging results. She states that patient has had a f/u with PCP but no Pulmonology referral has been placed at this time.       - Dagmar Bahena CMA  Lung Screening Navigator

## 2025-04-23 ENCOUNTER — TRANSCRIBE ORDERS (OUTPATIENT)
Dept: GENERAL RADIOLOGY | Facility: HOSPITAL | Age: 60
End: 2025-04-23

## 2025-04-23 DIAGNOSIS — R91.1 PULMONARY NODULE: Primary | ICD-10-CM

## 2025-04-29 ENCOUNTER — HOSPITAL ENCOUNTER (OUTPATIENT)
Dept: CT IMAGING | Facility: HOSPITAL | Age: 60
Discharge: HOME | End: 2025-04-29
Payer: MEDICARE

## 2025-04-29 DIAGNOSIS — R91.1 PULMONARY NODULE: ICD-10-CM

## 2025-04-29 PROCEDURE — 71250 CT THORAX DX C-: CPT

## 2025-07-10 ENCOUNTER — APPOINTMENT (OUTPATIENT)
Dept: GENERAL RADIOLOGY | Facility: HOSPITAL | Age: 60
End: 2025-07-10
Attending: HOSPITALIST
Payer: MEDICARE

## 2025-07-10 ENCOUNTER — HOSPITAL ENCOUNTER (EMERGENCY)
Facility: HOSPITAL | Age: 60
Discharge: HOME | End: 2025-07-10
Attending: HOSPITALIST
Payer: MEDICARE

## 2025-07-10 VITALS
OXYGEN SATURATION: 94 % | RESPIRATION RATE: 25 BRPM | HEART RATE: 86 BPM | DIASTOLIC BLOOD PRESSURE: 93 MMHG | HEIGHT: 71 IN | WEIGHT: 198 LBS | TEMPERATURE: 98.7 F | BODY MASS INDEX: 27.72 KG/M2 | SYSTOLIC BLOOD PRESSURE: 133 MMHG

## 2025-07-10 DIAGNOSIS — J44.1 COPD WITH ACUTE EXACERBATION (HCC): ICD-10-CM

## 2025-07-10 DIAGNOSIS — J18.9 PNEUMONIA OF RIGHT LOWER LOBE DUE TO INFECTIOUS ORGANISM: Primary | ICD-10-CM

## 2025-07-10 LAB
ALBUMIN SERPL-MCNC: 3.3 G/DL (ref 3.4–4.8)
ALBUMIN/GLOB SERPL: 1 {RATIO} (ref 0.8–2)
ALP SERPL-CCNC: 83 U/L (ref 25–100)
ALT SERPL-CCNC: 25 U/L (ref 4–36)
ANION GAP SERPL CALCULATED.3IONS-SCNC: 12 MMOL/L (ref 3–16)
AST SERPL-CCNC: 30 U/L (ref 8–33)
BASOPHILS # BLD: 0.1 K/UL (ref 0–0.1)
BASOPHILS NFR BLD: 0.2 %
BILIRUB SERPL-MCNC: 0.8 MG/DL (ref 0.3–1.2)
BUN SERPL-MCNC: 9 MG/DL (ref 6–20)
CALCIUM SERPL-MCNC: 8.6 MG/DL (ref 8.3–10.6)
CHLORIDE SERPL-SCNC: 105 MMOL/L (ref 98–107)
CO2 SERPL-SCNC: 21 MMOL/L (ref 20–30)
CREAT SERPL-MCNC: 0.8 MG/DL (ref 0.8–1.3)
EOSINOPHIL # BLD: 0.1 K/UL (ref 0–0.4)
EOSINOPHIL NFR BLD: 0.4 %
ERYTHROCYTE [DISTWIDTH] IN BLOOD BY AUTOMATED COUNT: 13.9 % (ref 11–16)
FLUAV AG NPH QL: NEGATIVE
FLUBV AG NPH QL: NEGATIVE
GFR SERPLBLD CREATININE-BSD FMLA CKD-EPI: >90 ML/MIN/{1.73_M2}
GLOBULIN SER CALC-MCNC: 3.2 G/DL
GLUCOSE SERPL-MCNC: 121 MG/DL (ref 74–106)
HCT VFR BLD AUTO: 40.1 % (ref 40–54)
HGB BLD-MCNC: 13.4 G/DL (ref 13–18)
IMM GRANULOCYTES # BLD: 0.1 K/UL
IMM GRANULOCYTES NFR BLD: 0.5 % (ref 0–5)
LACTATE BLDV-SCNC: 1.8 MMOL/L (ref 0.4–1.9)
LYMPHOCYTES # BLD: 2.9 K/UL (ref 1.5–4)
LYMPHOCYTES NFR BLD: 12.6 %
MCH RBC QN AUTO: 30.5 PG (ref 27–32)
MCHC RBC AUTO-ENTMCNC: 33.4 G/DL (ref 31–35)
MCV RBC AUTO: 91.1 FL (ref 80–100)
MONOCYTES # BLD: 1.7 K/UL (ref 0.2–0.8)
MONOCYTES NFR BLD: 7.6 %
NEUTROPHILS # BLD: 17.8 K/UL (ref 2–7.5)
NEUTS SEG NFR BLD: 78.7 %
NT-PROBNP SERPL-MCNC: 95 PG/ML (ref 0–1800)
PLATELET # BLD AUTO: 246 K/UL (ref 150–400)
PMV BLD AUTO: 11.1 FL (ref 6–10)
POTASSIUM SERPL-SCNC: 3.8 MMOL/L (ref 3.4–5.1)
PROCALCITONIN SERPL IA-MCNC: 1.16 NG/ML (ref 0–0.15)
PROT SERPL-MCNC: 6.5 G/DL (ref 6.4–8.3)
RBC # BLD AUTO: 4.4 M/UL (ref 4.5–6)
REASON FOR REJECTION: NORMAL
REJECTED TEST: NORMAL
S PYO AG THROAT QL: NEGATIVE
SARS-COV-2 RDRP RESP QL NAA+PROBE: NOT DETECTED
SODIUM SERPL-SCNC: 138 MMOL/L (ref 136–145)
TROPONIN, HIGH SENSITIVITY: 7 NG/L (ref 0–22)
WBC # BLD AUTO: 22.6 K/UL (ref 4–11)

## 2025-07-10 PROCEDURE — 96367 TX/PROPH/DG ADDL SEQ IV INF: CPT

## 2025-07-10 PROCEDURE — 6370000000 HC RX 637 (ALT 250 FOR IP): Performed by: HOSPITALIST

## 2025-07-10 PROCEDURE — 84484 ASSAY OF TROPONIN QUANT: CPT

## 2025-07-10 PROCEDURE — 2580000003 HC RX 258: Performed by: HOSPITALIST

## 2025-07-10 PROCEDURE — 83880 ASSAY OF NATRIURETIC PEPTIDE: CPT

## 2025-07-10 PROCEDURE — 85025 COMPLETE CBC W/AUTO DIFF WBC: CPT

## 2025-07-10 PROCEDURE — 96375 TX/PRO/DX INJ NEW DRUG ADDON: CPT

## 2025-07-10 PROCEDURE — 6360000002 HC RX W HCPCS: Performed by: HOSPITALIST

## 2025-07-10 PROCEDURE — 96361 HYDRATE IV INFUSION ADD-ON: CPT

## 2025-07-10 PROCEDURE — 71045 X-RAY EXAM CHEST 1 VIEW: CPT

## 2025-07-10 PROCEDURE — 36415 COLL VENOUS BLD VENIPUNCTURE: CPT

## 2025-07-10 PROCEDURE — 96365 THER/PROPH/DIAG IV INF INIT: CPT

## 2025-07-10 PROCEDURE — 87040 BLOOD CULTURE FOR BACTERIA: CPT

## 2025-07-10 PROCEDURE — 80053 COMPREHEN METABOLIC PANEL: CPT

## 2025-07-10 PROCEDURE — 87635 SARS-COV-2 COVID-19 AMP PRB: CPT

## 2025-07-10 PROCEDURE — 2500000003 HC RX 250 WO HCPCS: Performed by: HOSPITALIST

## 2025-07-10 PROCEDURE — 84145 PROCALCITONIN (PCT): CPT

## 2025-07-10 PROCEDURE — 99285 EMERGENCY DEPT VISIT HI MDM: CPT

## 2025-07-10 PROCEDURE — 87880 STREP A ASSAY W/OPTIC: CPT

## 2025-07-10 PROCEDURE — 83605 ASSAY OF LACTIC ACID: CPT

## 2025-07-10 PROCEDURE — 94640 AIRWAY INHALATION TREATMENT: CPT

## 2025-07-10 PROCEDURE — 87804 INFLUENZA ASSAY W/OPTIC: CPT

## 2025-07-10 RX ORDER — IPRATROPIUM BROMIDE AND ALBUTEROL SULFATE 2.5; .5 MG/3ML; MG/3ML
1 SOLUTION RESPIRATORY (INHALATION) ONCE
Status: COMPLETED | OUTPATIENT
Start: 2025-07-10 | End: 2025-07-10

## 2025-07-10 RX ORDER — DEXAMETHASONE SODIUM PHOSPHATE 10 MG/ML
10 INJECTION, SOLUTION INTRA-ARTICULAR; INTRALESIONAL; INTRAMUSCULAR; INTRAVENOUS; SOFT TISSUE ONCE
Status: COMPLETED | OUTPATIENT
Start: 2025-07-10 | End: 2025-07-10

## 2025-07-10 RX ORDER — PREDNISONE 20 MG/1
20 TABLET ORAL DAILY
Qty: 5 TABLET | Refills: 0 | Status: SHIPPED | OUTPATIENT
Start: 2025-07-10 | End: 2025-07-15

## 2025-07-10 RX ORDER — AZITHROMYCIN 250 MG/1
TABLET, FILM COATED ORAL
Qty: 6 TABLET | Refills: 0 | Status: SHIPPED | OUTPATIENT
Start: 2025-07-10 | End: 2025-07-20

## 2025-07-10 RX ORDER — ACETAMINOPHEN 500 MG
1000 TABLET ORAL ONCE
Status: COMPLETED | OUTPATIENT
Start: 2025-07-10 | End: 2025-07-10

## 2025-07-10 RX ORDER — 0.9 % SODIUM CHLORIDE 0.9 %
1000 INTRAVENOUS SOLUTION INTRAVENOUS ONCE
Status: COMPLETED | OUTPATIENT
Start: 2025-07-10 | End: 2025-07-10

## 2025-07-10 RX ORDER — CEFDINIR 300 MG/1
300 CAPSULE ORAL 2 TIMES DAILY
Qty: 20 CAPSULE | Refills: 0 | Status: SHIPPED | OUTPATIENT
Start: 2025-07-10 | End: 2025-07-20

## 2025-07-10 RX ADMIN — SODIUM CHLORIDE 1000 ML: 0.9 INJECTION, SOLUTION INTRAVENOUS at 17:36

## 2025-07-10 RX ADMIN — ACETAMINOPHEN 1000 MG: 500 TABLET, FILM COATED ORAL at 17:35

## 2025-07-10 RX ADMIN — IPRATROPIUM BROMIDE AND ALBUTEROL SULFATE 1 DOSE: .5; 3 SOLUTION RESPIRATORY (INHALATION) at 17:50

## 2025-07-10 RX ADMIN — PANTOPRAZOLE SODIUM 40 MG: 40 INJECTION, POWDER, LYOPHILIZED, FOR SOLUTION INTRAVENOUS at 17:36

## 2025-07-10 RX ADMIN — CEFTRIAXONE 1000 MG: 1 INJECTION, POWDER, FOR SOLUTION INTRAMUSCULAR; INTRAVENOUS at 18:21

## 2025-07-10 RX ADMIN — AZITHROMYCIN MONOHYDRATE 500 MG: 500 INJECTION, POWDER, LYOPHILIZED, FOR SOLUTION INTRAVENOUS at 19:04

## 2025-07-10 RX ADMIN — DEXAMETHASONE SODIUM PHOSPHATE 10 MG: 10 INJECTION INTRAMUSCULAR; INTRAVENOUS at 17:37

## 2025-07-10 NOTE — ED PROVIDER NOTES
[facility]  EMERGENCY DEPARTMENT ENCOUNTER        Pt Name: Thien Franklin  MRN: [id number]  Birthdate 1965  Date of evaluation: 7/10/2025  Provider: Russell Concepcion DO  PCP: Farrukh Jenkins PA  Note Started: 4:45 PM EDT 7/10/25    CHIEF COMPLAINT       Chief Complaint   Patient presents with    Shortness of Breath       HISTORY OF PRESENT ILLNESS: 1 or more Elements     History from : Patient    Limitations to history : None    Thien Franklin is a 60 y.o. male who presents to the emergency department for shortness of breath.  Patient states this has been ongoing for the past 3 days.  Patient states he has heartburn at night especially when he drinks milk or lays on his left side.  States he has been awoken several times with the sensation of acid reflux.  He is not sure if he is aspirating that into his lungs or not but he states he did feel like he had a fever today.  I was not able to check his temperature but he states he felt warm.  Patient had low-grade fever 100.3 upon arrival here.  Patient has history of COPD and emphysema.  He also has history of other lung disease he is had a lesion that was cavitary that he is had scans for that are following that is healing.  Patient states he does not use oxygen at home.  Patient denies headache out of ordinary.  Denies earache.  He denies nasal congestion or drainage but his nose was running in the room.  States he does have cough which is productive with either green to yellow sputum.  Patient denies any chest pain with the shortness of breath.  States he has used his inhaler just prior to arrival here.  Patient denies any nausea vomiting or diarrhea however he does have hiatal hernia so he does have risk factors for GERD.  Denies any dysuria change urinary frequency but states his urine is darker especially in the summer months.  He denies any body aches at the ordinary.  Positive for fever sensation at home.   Lactic acid normal at 1.8.    Portable chest radiograph read by radiology as patchy right lower lung opacity atelectasis versus pneumonia.    Patient's radiological diagnostic studies were discussed with him he does state understanding.  Since he does have findings consistent with possible right lower lobe pneumonia and has 22,600 white count so we will go and cover him with Rocephin and Zithromax here.  His lactic acid was normal at 1.8.  Was given the Tylenol for his fever.  Patient advised that he could possibly meet criteria for admission however we do not have any staff here available to take any more admissions to the hospital we could transfer him to the facility of higher care.  Patient declined he states even if he want to be admitted he would not because he has a special needs son at home and he cannot leave in there by himself.  Advised we will write him a prescription for Omnicef and Zithromax.  He is getting a dose this evening of Rocephin and Zithromax we does not need to pick anything up tonight he can pick them up in the morning he states he needs to go to the pharmacy anyway tomorrow.    Patient's final disposition will not be determined prior to shift change.  Awaiting blood work and finishing his IV antibiotics.  Advised that we have sent antibiotics and to the pharmacy for him.  There is already been called in along with his prednisone.  Patient final disposition be turned by the oncoming physician after shift change.    19:00p.m.  I have signed out Thien Franklin's Emergency Department care to Dr. Parra. We discussed the pertinent history, physical exam, completed/pending test results (if applicable) and current treatment plan. Please refer to his/her chart for the patients remaining Emergency Department course and final disposition.            CONSULTS: (Who and What was discussed)  None    Discussion with Other Profesionals : Final disposition be turned by the oncoming physician after

## 2025-07-10 NOTE — ED NOTES
Patient reports that he had a positive covid test at home. Dr. Concepcion notified and at bedside to discuss.

## 2025-07-10 NOTE — ED TRIAGE NOTES
Patient presents today with shortness of breath for 7 months. The last 3 days he has been worse. He reports that when he eats he will wake up feeling like he has aspirated his food. He felt like he had a fever, but did not check it. His cough has been productive, brown and green. He denies chest pain, headache, sore throat, congestion, and runny nose.

## 2025-07-12 LAB
BACTERIA BLD CULT ORG #2: NORMAL
BACTERIA BLD CULT: NORMAL

## 2025-07-15 LAB
BACTERIA BLD CULT ORG #2: NORMAL
BACTERIA BLD CULT: NORMAL

## (undated) DEVICE — ENDOSCOPY PORT CONNECTOR FOR OLYMPUS® SCOPES: Brand: ERBE

## (undated) DEVICE — Device

## (undated) DEVICE — PAD GRND REM POLYHESIVE A/ DISP

## (undated) DEVICE — FRCP BIOP RADLJAW4 HOT 2.2X240 BX40

## (undated) DEVICE — GLV SURG SENSICARE W/ALOE PF LF 8.5 STRL

## (undated) DEVICE — LUBE JELLY PK/2.75GM STRL BX/144

## (undated) DEVICE — HYBRID TUBING/CAP SET FOR OLYMPUS® SCOPES: Brand: ERBE

## (undated) DEVICE — VLV SXN AIR/H2O ORCAPOD3 1P/U STRL

## (undated) DEVICE — CONMED SCOPE SAVER BITE BLOCK, 20X27 MM: Brand: SCOPE SAVER

## (undated) DEVICE — JELLY,LUBE,STERILE,FLIP TOP,TUBE,2-OZ: Brand: MEDLINE

## (undated) DEVICE — SUCTION CANISTER, 1500CC, RIGID: Brand: DEROYAL

## (undated) DEVICE — ENDOGATOR AUXILIARY WATER JET CONNECTOR: Brand: ENDOGATOR